# Patient Record
Sex: FEMALE | Race: WHITE | NOT HISPANIC OR LATINO | Employment: PART TIME | URBAN - METROPOLITAN AREA
[De-identification: names, ages, dates, MRNs, and addresses within clinical notes are randomized per-mention and may not be internally consistent; named-entity substitution may affect disease eponyms.]

---

## 2017-01-02 ENCOUNTER — TRANSCRIBE ORDERS (OUTPATIENT)
Dept: ADMINISTRATIVE | Age: 37
End: 2017-01-02

## 2017-01-02 ENCOUNTER — APPOINTMENT (OUTPATIENT)
Dept: LAB | Age: 37
End: 2017-01-02
Payer: COMMERCIAL

## 2017-01-02 DIAGNOSIS — D72.819 DECREASED WHITE BLOOD CELL COUNT: ICD-10-CM

## 2017-01-02 DIAGNOSIS — R94.6 ABNORMAL RESULTS OF THYROID FUNCTION STUDIES: ICD-10-CM

## 2017-01-02 LAB
BASOPHILS # BLD AUTO: 0.01 THOUSANDS/ΜL (ref 0–0.1)
BASOPHILS NFR BLD AUTO: 0 % (ref 0–1)
EOSINOPHIL # BLD AUTO: 0.02 THOUSAND/ΜL (ref 0–0.61)
EOSINOPHIL NFR BLD AUTO: 1 % (ref 0–6)
ERYTHROCYTE [DISTWIDTH] IN BLOOD BY AUTOMATED COUNT: 13.2 % (ref 11.6–15.1)
HCT VFR BLD AUTO: 38.2 % (ref 34.8–46.1)
HGB BLD-MCNC: 12.8 G/DL (ref 11.5–15.4)
LYMPHOCYTES # BLD AUTO: 1.24 THOUSANDS/ΜL (ref 0.6–4.47)
LYMPHOCYTES NFR BLD AUTO: 33 % (ref 14–44)
MCH RBC QN AUTO: 30 PG (ref 26.8–34.3)
MCHC RBC AUTO-ENTMCNC: 33.5 G/DL (ref 31.4–37.4)
MCV RBC AUTO: 90 FL (ref 82–98)
MONOCYTES # BLD AUTO: 0.46 THOUSAND/ΜL (ref 0.17–1.22)
MONOCYTES NFR BLD AUTO: 12 % (ref 4–12)
NEUTROPHILS # BLD AUTO: 2.03 THOUSANDS/ΜL (ref 1.85–7.62)
NEUTS SEG NFR BLD AUTO: 54 % (ref 43–75)
NRBC BLD AUTO-RTO: 0 /100 WBCS
PLATELET # BLD AUTO: 183 THOUSANDS/UL (ref 149–390)
PMV BLD AUTO: 12.6 FL (ref 8.9–12.7)
RBC # BLD AUTO: 4.27 MILLION/UL (ref 3.81–5.12)
T4 FREE SERPL-MCNC: 0.8 NG/DL (ref 0.76–1.46)
TSH SERPL DL<=0.05 MIU/L-ACNC: 5.9 UIU/ML (ref 0.36–3.74)
WBC # BLD AUTO: 3.76 THOUSAND/UL (ref 4.31–10.16)

## 2017-01-02 PROCEDURE — 84443 ASSAY THYROID STIM HORMONE: CPT

## 2017-01-02 PROCEDURE — 83520 IMMUNOASSAY QUANT NOS NONAB: CPT

## 2017-01-02 PROCEDURE — 84439 ASSAY OF FREE THYROXINE: CPT

## 2017-01-02 PROCEDURE — 86800 THYROGLOBULIN ANTIBODY: CPT

## 2017-01-02 PROCEDURE — 85025 COMPLETE CBC W/AUTO DIFF WBC: CPT

## 2017-01-02 PROCEDURE — 36415 COLL VENOUS BLD VENIPUNCTURE: CPT

## 2017-01-03 ENCOUNTER — GENERIC CONVERSION - ENCOUNTER (OUTPATIENT)
Dept: OTHER | Facility: OTHER | Age: 37
End: 2017-01-03

## 2017-01-04 ENCOUNTER — GENERIC CONVERSION - ENCOUNTER (OUTPATIENT)
Dept: OTHER | Facility: OTHER | Age: 37
End: 2017-01-04

## 2017-01-04 LAB
PROTEINASE3 AB SER IA-ACNC: <3.5 U/ML (ref 0–3.5)
THYROGLOB AB SERPL-ACNC: <1 IU/ML (ref 0–0.9)

## 2017-01-23 ENCOUNTER — ALLSCRIPTS OFFICE VISIT (OUTPATIENT)
Dept: OTHER | Facility: OTHER | Age: 37
End: 2017-01-23

## 2017-01-30 ENCOUNTER — ALLSCRIPTS OFFICE VISIT (OUTPATIENT)
Dept: OTHER | Facility: OTHER | Age: 37
End: 2017-01-30

## 2017-02-27 ENCOUNTER — HOSPITAL ENCOUNTER (OUTPATIENT)
Dept: RADIOLOGY | Age: 37
Discharge: HOME | End: 2017-02-27
Payer: COMMERCIAL

## 2017-02-27 ENCOUNTER — TRANSCRIBE ORDERS (OUTPATIENT)
Dept: ADMINISTRATIVE | Age: 37
End: 2017-02-27

## 2017-02-27 ENCOUNTER — GENERIC CONVERSION - ENCOUNTER (OUTPATIENT)
Dept: OTHER | Facility: OTHER | Age: 37
End: 2017-02-27

## 2017-02-27 ENCOUNTER — LAB (OUTPATIENT)
Dept: LAB | Age: 37
End: 2017-02-27
Payer: COMMERCIAL

## 2017-02-27 DIAGNOSIS — E03.9 HYPOTHYROIDISM: ICD-10-CM

## 2017-02-27 DIAGNOSIS — N28.1 RENAL CYST: ICD-10-CM

## 2017-02-27 LAB
T4 FREE SERPL-MCNC: 0.87 NG/DL (ref 0.76–1.46)
TSH SERPL DL<=0.05 MIU/L-ACNC: 4.41 UIU/ML (ref 0.36–3.74)

## 2017-02-27 PROCEDURE — 36415 COLL VENOUS BLD VENIPUNCTURE: CPT

## 2017-02-27 PROCEDURE — 84439 ASSAY OF FREE THYROXINE: CPT

## 2017-02-27 PROCEDURE — 76770 US EXAM ABDO BACK WALL COMP: CPT

## 2017-02-27 PROCEDURE — 84443 ASSAY THYROID STIM HORMONE: CPT

## 2017-04-06 ENCOUNTER — ALLSCRIPTS OFFICE VISIT (OUTPATIENT)
Dept: OTHER | Facility: OTHER | Age: 37
End: 2017-04-06

## 2017-04-06 ENCOUNTER — TRANSCRIBE ORDERS (OUTPATIENT)
Dept: ADMINISTRATIVE | Facility: HOSPITAL | Age: 37
End: 2017-04-06

## 2017-04-06 DIAGNOSIS — N28.1 ACQUIRED CYST OF KIDNEY: Primary | ICD-10-CM

## 2017-04-13 ENCOUNTER — ALLSCRIPTS OFFICE VISIT (OUTPATIENT)
Dept: OTHER | Facility: OTHER | Age: 37
End: 2017-04-13

## 2017-04-13 DIAGNOSIS — E53.8 DEFICIENCY OF OTHER SPECIFIED B GROUP VITAMINS: ICD-10-CM

## 2017-04-27 DIAGNOSIS — E03.9 HYPOTHYROIDISM: ICD-10-CM

## 2017-06-12 ENCOUNTER — ALLSCRIPTS OFFICE VISIT (OUTPATIENT)
Dept: OTHER | Facility: OTHER | Age: 37
End: 2017-06-12

## 2017-06-12 DIAGNOSIS — E03.9 HYPOTHYROIDISM: ICD-10-CM

## 2017-06-12 DIAGNOSIS — Z00.00 ENCOUNTER FOR GENERAL ADULT MEDICAL EXAMINATION WITHOUT ABNORMAL FINDINGS: ICD-10-CM

## 2017-08-30 DIAGNOSIS — E03.9 HYPOTHYROIDISM: ICD-10-CM

## 2017-09-28 ENCOUNTER — TRANSCRIBE ORDERS (OUTPATIENT)
Dept: ADMINISTRATIVE | Age: 37
End: 2017-09-28

## 2017-09-28 ENCOUNTER — APPOINTMENT (OUTPATIENT)
Dept: RADIOLOGY | Age: 37
End: 2017-09-28
Payer: COMMERCIAL

## 2017-09-28 DIAGNOSIS — M85.80 SAPHO SYNDROME (HCC): ICD-10-CM

## 2017-09-28 DIAGNOSIS — M15.0 PRIMARY GENERALIZED HYPERTROPHIC OSTEOARTHROSIS: ICD-10-CM

## 2017-09-28 DIAGNOSIS — M86.9 SAPHO SYNDROME (HCC): ICD-10-CM

## 2017-09-28 DIAGNOSIS — M65.9 SAPHO SYNDROME (HCC): ICD-10-CM

## 2017-09-28 DIAGNOSIS — L40.3 SAPHO SYNDROME (HCC): ICD-10-CM

## 2017-09-28 DIAGNOSIS — L70.9 SAPHO SYNDROME (HCC): ICD-10-CM

## 2017-09-28 DIAGNOSIS — M15.0 PRIMARY GENERALIZED HYPERTROPHIC OSTEOARTHROSIS: Primary | ICD-10-CM

## 2017-09-28 PROCEDURE — 72050 X-RAY EXAM NECK SPINE 4/5VWS: CPT

## 2017-09-28 PROCEDURE — 73030 X-RAY EXAM OF SHOULDER: CPT

## 2017-09-28 PROCEDURE — 72110 X-RAY EXAM L-2 SPINE 4/>VWS: CPT

## 2017-10-31 ENCOUNTER — ALLSCRIPTS OFFICE VISIT (OUTPATIENT)
Dept: OTHER | Facility: OTHER | Age: 37
End: 2017-10-31

## 2017-11-01 NOTE — PROGRESS NOTES
Assessment  1  Fibromyalgia (729 1) (M79 7)    Plan   2 - Cristo ROSAS, Renay Camarena  (Rheumatology) Co-Management  *  Status: Hold For - Scheduling  Requested for: [id number]  Ordered; For: Fibromyalgia; Ordered By: Alejandro Cabrera  Performed:   Due: [id number]     Discussion/Summary  Discussion Summary:   Bloodwork reviewd, normaldr cristo for fibromyalgiadate with pap and mammo  Chief Complaint  Chief Complaint Chronic Condition [facility]: Patient is here today for follow up of chronic conditions described in HPI  History of Present Illness  HPI: patient is to follow up on chronic conditionsmultiple joint and back painarthritis on xraywith fibromyalgia  [facility]      Review of Systems  Complete-Female:   Constitutional: no fever,-- not feeling poorly,-- no chills-- and-- not feeling tired  Eyes: no eye pain,-- no eyesight problems,-- eyes not red-- and-- no purulent discharge from the eyes  ENT: no earache,-- no nosebleeds,-- no hearing loss-- and-- no nasal discharge  Cardiovascular: the heart rate was not slow,-- no chest pain,-- the heart rate was not fast-- and-- no palpitations  Respiratory: no shortness of breath,-- no cough,-- no wheezing-- and-- no shortness of breath during exertion  Gastrointestinal: no abdominal pain,-- no nausea,-- no constipation-- and-- no diarrhea  Musculoskeletal: arthralgias-- and-- myalgias  Integumentary: no rashes-- and-- no skin lesions  Neurological: no headache-- and-- no numbness  Psychiatric: no anxiety,-- no sleep disturbances-- and-- no depression  Hematologic/Lymphatic: no tendency for easy bleeding  Active Problems  1  Acquired complex cyst of kidney (593 2) (N28 1)   2  Acute sinusitis (461 9) (J01 90)   3  Allergic rhinitis (477 9) (J30 9)   4  Anemia (285 9) (D64 9)   5  Arachnoid cyst (348 0) (G93 0)   6  Arthritis (716 90) (M19 90)   7  Bursitis, hip, right (726 5) (M70 71)   8   Hip pain, right (719 45) (M25 551)   9  Hypothyroidism (244 9) (E03 9)   10  Leukopenia (288 50) (D72 819)   11  Menorrhagia (626 2) (N92 0)   12  Myofascial pain syndrome (729 1) (M79 1)   13  Nonrheumatic mitral valve insufficiency (424 0) (I34 0)   14  Non-smoker (V49 89) (Z78 9)   15  Other fatigue (780 79) (R53 83)   16  Otitis media, left (382 9) (H66 92)   17  Pain of right great toe (729 5) (M79 674)   18  Pigmented nevus (216 9) (D22 9)   19  Renal cyst, left (753 10) (N28 1)   20  Sacroiliitis (720 2) (M46 1)   21  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)   22  Vitamin B12 deficiency (266 2) (E53 8)   23  Vitamin D deficiency (268 9) (E55 9)    Past Medical History  1  History of Abnormal blood chemistry (790 6) (R79 9)   2  History of Arthritis (V13 4)   3  History of Cervical radiculopathy (723 4) (M54 12)   4  History of Cough (786 2) (R05)   5  History of Facial Numbness (782 0)   6  History of acute bronchitis (V12 69) (Z87 09)   7  History of acute otitis media (V12 49) (Z86 69)   8  History of acute sinusitis (V12 69) (Z87 09)   9  History of acute sinusitis (V12 69) (Z87 09)   10  History of bronchitis (V12 69) (Z87 09)   11  History of chest pain (V13 89) (Z87 898)   12  History of viral gastroenteritis (V12 09) (Z86 19)   13  History of Neck pain (723 1) (M54 2)   14  History of Need for influenza vaccination (V04 81) (Z23)   15  History of Need for pneumococcal vaccination (V03 82) (Z23)   16  History of Nonepileptic Seizures (780 39)   17  History of Numbness Of Both Arms (782 0)   18  History of Pain in joint of left shoulder (719 41) (M25 512)  Active Problems And Past Medical History Reviewed: The active problems and past medical history were reviewed and updated today  Surgical History  1  History of Denial Of Any Significant Medical History   2  History of Hysterectomy (V88 01)  Surgical History Reviewed: The surgical history was reviewed and updated today  Family History  Father    1  Family history of Exposure To Chemical Contaminants - Dioxin  Family History    2  Family history of Alzheimer Disease   3  Family history of Arthritis (V17 7)   4  Family history of Bone Cancer   5  Family history of Breast Cancer (V16 3)   6  Family history of Cancer   7  Family history of Heart Disease (V17 49)   8  Family history of Hypertension (V17 49)   9  Family history of Skin Cancer (V16 8)   10  Family history of Stroke Complications   11  Family history of Thyroid Disorder (V18 19)  Family History Reviewed: The family history was reviewed and updated today  Social History   · Denied: History of Alcohol Use (History)   · Denied: History of Caffeine Use   · Denied: Drug Use   · Educational Level - Grade 12 Completed   · Marital History - Currently    · Never a smoker   · Never Drank Alcohol   · Non-smoker (V49 89) (Z78 9)  Social History Reviewed: The social history was reviewed and updated today  The social history was reviewed and is unchanged  Current Meds   1  Calcium 500 + D 500-125 MG-UNIT Oral Tablet; TAKE 1 TABLET DAILY; Therapy: (Recorded:31Oct2017) to Recorded   2  Ami-C Oral Tablet; TAKE 1 TABLET DAILY AS DIRECTED; Therapy: (Recorded:31Oct2017) to Recorded   3  Levothyroxine Sodium 25 MCG Oral Tablet; TAKE 1 TABLET BY MOUTH IN THE MORNING; Therapy: 10BJM0766 to (GQBYVUKE:82NMC5808)  Requested for: 77VOP3118; Last Rx:30Jun2017   Ordered   4  Vitamin B-12 500 MCG Oral Tablet; TAKE 1 TABLET DAILY; Therapy: 02KVE6624 to Recorded   5  Vitamin D 2000 UNIT Oral Tablet; Therapy: (Recorded:31Oct2017) to Recorded  Medication List Reviewed: The medication list was reviewed and updated today  Allergies  1  Iron TABS   2  Penicillins   3  Advil TABS  4   Seasonal    Vitals  Vital Signs    Recorded: 58NMW4797 07:59AM   Heart Rate 64   Respiration 16   Systolic 455, LUE, Sitting   Diastolic 72, LUE, Sitting   BP CUFF SIZE Large   Height 5 ft 4 in   Weight 177 lb 0 6 oz BMI Calculated 30 39   BSA Calculated 1 86     Physical Exam    Constitutional   General appearance: No acute distress, well appearing and well nourished  Eyes   Conjunctiva and lids: No swelling, erythema or discharge  Pupils and irises: Equal, round and reactive to light  Ears, Nose, Mouth, and Throat   External inspection of ears and nose: Normal     Otoscopic examination: Tympanic membranes translucent with normal light reflex  Canals patent without erythema  Nasal mucosa, septum, and turbinates: Normal without edema or erythema  Oropharynx: Normal with no erythema, edema, exudate or lesions  Pulmonary   Respiratory effort: No increased work of breathing or signs of respiratory distress  Auscultation of lungs: Clear to auscultation  Cardiovascular   Auscultation of heart: Normal rate and rhythm, normal S1 and S2, without murmurs  Examination of extremities for edema and/or varicosities: Normal     Abdomen   Abdomen: Non-tender, no masses  Liver and spleen: No hepatomegaly or splenomegaly  Lymphatic   Palpation of lymph nodes in neck: No lymphadenopathy  Musculoskeletal   Gait and station: Normal     Digits and nails: Normal without clubbing or cyanosis  Inspection/palpation of joints, bones, and muscles: Normal     Skin   Skin and subcutaneous tissue: Normal without rashes or lesions      Psychiatric   Orientation to person, place, and time: Normal     Mood and affect: Normal          Future Appointments    Date/Time Provider Specialty Site   05/02/2018 08:00 AM Giana Gomez, [facility]  [facility]   04/10/2018 02:30 PM [address] [place] [address] [address]   01/23/2018 08:00 AM Tamiko Graff MD Hematology Oncology [facility] [place]     Signatures   Electronically signed by : Baljit Howard; Oct 31 2017  5:33PM EST                       (Author)    Electronically signed by : Dimitri Kaminski M D ; Oct 31 2017  5:46PM EST                       (Review)

## 2017-12-19 ENCOUNTER — ALLSCRIPTS OFFICE VISIT (OUTPATIENT)
Dept: OTHER | Facility: OTHER | Age: 37
End: 2017-12-19

## 2017-12-23 NOTE — PROGRESS NOTES
Assessment   1  Acute sinusitis (461 9) (J01 90)    Plan   PMH: History of acute sinusitis    · Azithromycin 250 MG Oral Tablet; TAKE 2 TABLETS ON DAY 1 THEN TAKE 1    TABLET A DAY FOR 4 DAYS    Discussion/Summary      Start antibiotics  of fluids and rest       Chief Complaint   Patient presents today for a cough  History of Present Illness   HPI: since saturday has been having cough, nasal congestion, pnd, right earache          Review of Systems        Constitutional: no fever,-- not feeling poorly,-- no chills-- and-- not feeling tired  ENT: earache-- and-- nasal discharge, but-- no nosebleeds-- and-- no hearing loss  Cardiovascular: the heart rate was not slow,-- no chest pain,-- the heart rate was not fast-- and-- no palpitations  Respiratory: cough, but-- no shortness of breath,-- no wheezing-- and-- no shortness of breath during exertion  Gastrointestinal: no abdominal pain,-- no nausea,-- no constipation-- and-- no diarrhea  Integumentary: no rashes,-- no itching,-- no skin lesions-- and-- no skin wound  Neurological: no headache,-- no numbness-- and-- no dizziness  Active Problems   1  Acquired complex cyst of kidney (593 2) (N28 1)   2  Acute sinusitis (461 9) (J01 90)   3  Allergic rhinitis (477 9) (J30 9)   4  Anemia (285 9) (D64 9)   5  Arachnoid cyst (348 0) (G93 0)   6  Arthritis (716 90) (M19 90)   7  Bursitis, hip, right (726 5) (M70 71)   8  Fibromyalgia (729 1) (M79 7)   9  Hip pain, right (719 45) (M25 551)   10  Hypothyroidism (244 9) (E03 9)   11  Leukopenia (288 50) (D72 819)   12  Menorrhagia (626 2) (N92 0)   13  Myofascial pain syndrome (729 1) (M79 1)   14  Nonrheumatic mitral valve insufficiency (424 0) (I34 0)   15  Non-smoker (V49 89) (Z78 9)   16  Other fatigue (780 79) (R53 83)   17  Otitis media, left (382 9) (H66 92)   18  Pain of right great toe (729 5) (M79 674)   19  Pigmented nevus (216 9) (D22 9)   20   Renal cyst, left (753 10) (N28 1) 21  Sacroiliitis (720 2) (M46 1)   22  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)   23  Vitamin B12 deficiency (266 2) (E53 8)   24  Vitamin D deficiency (268 9) (E55 9)    Past Medical History   Active Problems And Past Medical History Reviewed: The active problems and past medical history were reviewed and updated today  Surgical History   Surgical History Reviewed: The surgical history was reviewed and updated today  Social History    · Denied: History of Alcohol Use (History)   · Denied: History of Caffeine Use   · Denied: Drug Use   · Educational Level - Grade 12 Completed   · Marital History - Currently    · Never a smoker   · Never Drank Alcohol   · Never smoked cigarettes (V49 89) (Z78 9)   · Non-smoker (V49 89) (Z78 9)  The social history was reviewed and updated today  The social history was reviewed and is unchanged  Family History   Family History Reviewed: The family history was reviewed and updated today  Current Meds    1  Calcium 500 + D 500-125 MG-UNIT Oral Tablet; TAKE 1 TABLET DAILY; Therapy: (Recorded:31Oct2017) to Recorded   2  Ami-C Oral Tablet; TAKE 1 TABLET DAILY AS DIRECTED; Therapy: (Recorded:31Oct2017) to Recorded   3  Levothyroxine Sodium 25 MCG Oral Tablet; TAKE 1 TABLET BY MOUTH IN THE     MORNING; Therapy: 21SLU2699 to (OILWDTZI:64YCL9229)  Requested for: 84ISU8577; Last     Rx:30Jun2017 Ordered   4  Vitamin B-12 500 MCG Oral Tablet; TAKE 1 TABLET DAILY; Therapy: 85IGS7268 to Recorded   5  Vitamin D 2000 UNIT Oral Tablet; Therapy: (Recorded:31Oct2017) to Recorded     The medication list was reviewed and updated today  Allergies   1  Iron TABS   2  Penicillins   3  Advil TABS  4   Seasonal    Vitals    Recorded: 33ALW5437 04:01PM   Heart Rate 86   Respiration 20   Systolic 874   Diastolic 72   Height 5 ft 4 in   Weight 175 lb 0 4 oz   BMI Calculated 30 04   BSA Calculated 1 85   O2 Saturation 99     Physical Exam Constitutional      General appearance: No acute distress, well appearing and well nourished  Eyes      Conjunctiva and lids: No swelling, erythema or discharge  Pupils and irises: Equal, round and reactive to light  Ears, Nose, Mouth, and Throat      External inspection of ears and nose: Normal        Otoscopic examination: Tympanic membranes translucent with normal light reflex  Canals patent without erythema  Nasal mucosa, septum, and turbinates: Normal without edema or erythema  Oropharynx: Normal with no erythema, edema, exudate or lesions  Pulmonary      Respiratory effort: No increased work of breathing or signs of respiratory distress  Auscultation of lungs: Clear to auscultation  Cardiovascular      Palpation of heart: Normal PMI, no thrills  Auscultation of heart: Normal rate and rhythm, normal S1 and S2, without murmurs  Examination of extremities for edema and/or varicosities: Normal        Carotid pulses: Normal        Abdomen      Abdomen: Non-tender, no masses  Liver and spleen: No hepatomegaly or splenomegaly  Lymphatic      Palpation of lymph nodes in neck: No lymphadenopathy  Musculoskeletal      Digits and nails: Normal without clubbing or cyanosis  Skin      Skin and subcutaneous tissue: Normal without rashes or lesions         Psychiatric      Orientation to person, place, and time: Normal        Mood and affect: Normal           Future Appointments      Date/Time Provider Specialty Site   05/02/2018 08:00 AM [facility], [facility]  [facility]   04/10/2018 02:30 PM Cami Bernal Urology [address]   01/23/2018 08:00 AM Phyllis Graff MD Hematology Oncology [facility] [place]     Signatures    Electronically signed by : Sailaja Apple; Dec 21 2017 12:44PM EST                       (Author)     Electronically signed by : Rowan Chaparro ISHAN Hernandez ; Dec 22 2017 11:48AM EST                       (Review)

## 2018-01-08 DIAGNOSIS — D64.9 ANEMIA: ICD-10-CM

## 2018-01-09 NOTE — RESULT NOTES
Verified Results  (1) TSH WITH FT4 REFLEX 57Wmm5729 07:50AM Marcellus Murdock Order Number: [id number]     Test Name Result Flag Reference   TSH 4 410 uIU/mL H 0 358-3 740   Patients undergoing fluorescein dye angiography may retain small amounts of fluorescein in the body for 48-72 hours post procedure  Samples containing fluorescein can produce falsely depressed TSH values  If the patient had this procedure,a specimen should be resubmitted post fluorescein clearance            The recommended reference ranges for TSH during pregnancy are as follows:  First trimester 0 1 to 2 5 uIU/mL  Second trimester  0 2 to 3 0 uIU/mL  Third trimester 0 3 to 3 0 uIU/m   T4,FREE 0 87 ng/dL  0 76-1 46

## 2018-01-11 NOTE — RESULT NOTES
Verified Results  US THYROID [id number] 07:55AM Adalberto Hernandez Order Number: [id number]    - Patient Instructions: To schedule this appointment, please contact Central Scheduling at [phone]  Test Name Result Flag Reference   US THYROID (Report)     THYROID ULTRASOUND     INDICATION: Nontoxic single thyroid nodule  Abnormal thyroid lab values  COMPARISON: Thyroid ultrasound February 1, 2012     TECHNIQUE:  Ultrasound of the thyroid was performed with a high frequency linear transducer in transverse and sagittal planes including volumetric imaging sweeps as well as traditional still imaging technique  FINDINGS:   Normal homogeneous smooth echotexture  Right gland: 5 6 x 1 0 x 1 6 cm  No dominant nodules  Left gland: 4 6 x 0 8 x 1 4 cm  No dominant nodules  Isthmus: 0 2 cm in AP dimension  No dominant nodules  IMPRESSION:      Normal sonographic appearance of the thyroid gland  Workstation performed: [id number]     Signed by:   Antonio Almendarez DO   9/19/16       Discussion/Summary   THYROID US NORMAL

## 2018-01-12 VITALS
OXYGEN SATURATION: 98 % | WEIGHT: 172.01 LBS | DIASTOLIC BLOOD PRESSURE: 62 MMHG | RESPIRATION RATE: 16 BRPM | HEIGHT: 64 IN | HEART RATE: 111 BPM | SYSTOLIC BLOOD PRESSURE: 112 MMHG | BODY MASS INDEX: 29.37 KG/M2 | TEMPERATURE: 98 F

## 2018-01-12 VITALS
DIASTOLIC BLOOD PRESSURE: 72 MMHG | WEIGHT: 177.04 LBS | HEART RATE: 64 BPM | RESPIRATION RATE: 16 BRPM | HEIGHT: 64 IN | SYSTOLIC BLOOD PRESSURE: 116 MMHG | BODY MASS INDEX: 30.22 KG/M2

## 2018-01-12 NOTE — RESULT NOTES
Verified Results  (1) ANTITHYROGLOBULIN ANTIBODY 02Jan2017 10:15AM Jen Boyd Order Number: [id number]     Test Name Result Flag Reference   THYROGLOB AB <1 0 IU/mL  0 0 - 0 9   Thyroglobulin Antibody measured by [facility] Methodology    Performed at:  [address]  [id number]  : Radha Pak MD, Phone:  [phone]       Discussion/Summary   THYROID ANTIBODY NEGATIVE

## 2018-01-13 VITALS
SYSTOLIC BLOOD PRESSURE: 120 MMHG | WEIGHT: 176 LBS | BODY MASS INDEX: 30.05 KG/M2 | HEART RATE: 76 BPM | DIASTOLIC BLOOD PRESSURE: 78 MMHG | HEIGHT: 64 IN

## 2018-01-13 VITALS
HEART RATE: 81 BPM | RESPIRATION RATE: 18 BRPM | TEMPERATURE: 98.1 F | HEIGHT: 64 IN | DIASTOLIC BLOOD PRESSURE: 70 MMHG | OXYGEN SATURATION: 98 % | WEIGHT: 174.04 LBS | BODY MASS INDEX: 29.71 KG/M2 | SYSTOLIC BLOOD PRESSURE: 110 MMHG

## 2018-01-13 NOTE — RESULT NOTES
Verified Results  * MRI HIP RIGHT WO CONTRAST [id number] 12:42PM Jessy Picking Order Number: [id number]     Test Name Result Flag Reference   MRI HIP RIGHT WO CONTRAST (Report)     MRI RIGHT HIP     INDICATION: Right hip pain, difficulty walking for 1 1/2 years  COMPARISON: Abdomen and pelvic CT from 2/15/2016  TECHNIQUE: MR sequences were obtained of the right hip and pelvis including: Localizer, axial T2 fat sat, coronal T1/STIR, cone-down axial oblique PD of the affected hip, coronal PD of the affected hip, sagittal T2 fat sat of the affected hip  Images    were acquired on a 1 5 Monet unit  Gadolinium was not used  FINDINGS:     RIGHT HIP:     -JOINT EFFUSION: None  -BONE MARROW SIGNAL AND ALIGNMENT: Normal marrow signal demonstrated without hip fracture or AVN  -ACETABULAR LABRUM: Intact  -TROCHANTERIC BURSA: There is mild trochanteric bursitis  LEFT HIP: (please note dedicated small field of view images were not made of the left hip joint limiting its evaluation )      -JOINT EFFUSION: None  -BONE MARROW SIGNAL AND ALIGNMENT: Normal marrow signal demonstrated without hip fracture or AVN  -ACETABULAR LABRUM: No gross abnormalities although evaluation is very limited  -TROCHANTERIC BURSA: There is mild trochanteric bursitis  REST OF PELVIS:     -BONE MARROW SIGNAL: Normal      -SI JOINTS AND SYMPHYSIS PUBIS: Intact  -VISUALIZED LUMBAR SPINE: Unremarkable  -MUSCULATURE: Intact with intact hamstring origins bilaterally  -PELVIC SOFT TISSUES: Patient status post hysterectomy  SUBCUTANEOUS TISSUES: Normal       IMPRESSION:     Mild trochanteric bursitis bilaterally, otherwise unremarkable MRI of the right hip  Workstation performed: [id number]     Signed by:   Stephen Colin MD   6/15/16       Discussion/Summary   MRI SHOWS BURSITIS OF THE HIP  IF YOU SEE ORTHO THEY CAN SOMETIMES DO INJECTIONS OF STEROIDS TO HELP WITH PAIN    SOMETIMES ANTI-INFLAMMATORY MEDICATIONS HELP    CALL WITH ANY QUESTIONS      Signatures   Electronically signed by : DENNY Garcia; Jun 16 2016 11:18PM EST                       (Author)

## 2018-01-13 NOTE — PROGRESS NOTES
Assessment    1  Encounter for preventive health examination (V70 0) (Z00 00)   2  Hypothyroidism (244 9) (E03 9)   3  Other fatigue (780 79) (R53 83)    Plan  Health Maintenance, Hypothyroidism    · (1) CBC/PLT/DIFF; Status:Active; Requested [id number];    · (1) COMPREHENSIVE METABOLIC PANEL; Status:Active; Requested [id number];    · (1) LIPID PANEL FASTING W DIRECT LDL REFLEX; Status:Active; Requested  MTV:[id number];    · (1) TSH WITH FT4 REFLEX; Status:Active; Requested [id number];     Discussion/Summary  health maintenance visit Currently, she eats a healthy diet  the risks and benefits of cervical cancer screening were discussed cervical cancer screening is current cervical cancer screening is needed every year Breast cancer screening: breast cancer screening is not indicated  Colorectal cancer screening: colorectal cancer screening is not indicated  Advice and education were given regarding nutrition, aerobic exercise, weight loss, self skin examination, helmet use and seat belt use  Continue current meds  bloodwork ordered  eat healthy and exercise  Possible side effects of new medications were reviewed with the patient/guardian today  The treatment plan was reviewed with the patient/guardian  The patient/guardian understands and agrees with the treatment plan      Chief Complaint  Patient presents today for a wellness  History of Present Illness  HM, Adult Female: The patient is being seen for a health maintenance evaluation  The last health maintenance visit was 1 year(s) ago  General Health: The patient's health since the last visit is described as good  She has regular dental visits  She denies vision problems  She denies hearing loss  Lifestyle:  She consumes a diverse and healthy diet  She does not have any weight concerns  She exercises regularly  Reproductive health: the patient is premenopausal   she reports normal menses  pregnancy history: G 2P 2     Screening: cancer screening reviewed and current  metabolic screening reviewed and current  risk screening reviewed and current  HPI: patient is here for a physical for camp  she has hypothyroidism which is under control with levothyroxine      Review of Systems    Constitutional: no fever, not feeling poorly, no chills and not feeling tired  Eyes: no eye pain, no eyesight problems and no purulent discharge from the eyes  ENT: no earache, no nosebleeds and no nasal discharge  Cardiovascular: the heart rate was not slow, no chest pain, the heart rate was not fast and no palpitations  Respiratory: no shortness of breath, no cough, no wheezing and no shortness of breath during exertion  Gastrointestinal: no abdominal pain, no nausea, no constipation and no diarrhea  Genitourinary: no dysuria  Musculoskeletal: no arthralgias, no joint swelling and no joint stiffness  Integumentary: no rashes, no itching and no skin wound  Neurological: no headache, no numbness and no dizziness  Psychiatric: no anxiety, no sleep disturbances and no depression  Hematologic/Lymphatic: no swollen glands  Active Problems    1  Acquired complex cyst of kidney (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 2) (N28 1)   2  Acute sinusitis (461 9) (J01 90)   3  Allergic rhinitis (477 9) (J30 9)   4  Anemia (285 9) (D64 9)   5  Arachnoid cyst (348 0) (G93 0)   6  Arthritis (716 90) (M19 90)   7  Bursitis, hip, right (726 5) (M70 71)   8  Hip pain, right (719 45) (M25 551)   9  Hypothyroidism (244 9) (E03 9)   10  Leukopenia (288 50) (D72 819)   11  Menorrhagia (626 2) (N92 0)   12  Myofascial pain syndrome (729 1) (M79 1)   13  Nonrheumatic mitral valve insufficiency (424 0) (I34 0)   14  Non-smoker (V49 89) (Z78 9)   15  Other fatigue (780 79) (R53 83)   16  Otitis media, left (382 9) (H66 92)   17  Pain of right great toe (729 5) (M79 674)   18  Pigmented nevus (216 9) (D22 9)   19  Renal cyst, left (753 10) (N28 1)   20  Sacroiliitis (720 2) (M46 1)   21   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)   22  Vitamin B12 deficiency (266 2) (E53 8)   23   Vitamin D deficiency (268 9) (E55 9)    Past Medical History    · History of Abnormal blood chemistry (790 6) (R79 9)   · History of Arthritis (V13 4)   · History of Cervical radiculopathy (723 4) (M54 12)   · History of Cough (786 2) (R05)   · History of Facial Numbness (782 0)   · History of acute bronchitis (V12 69) (Z87 09)   · History of acute otitis media (V12 49) (Z86 69)   · History of acute sinusitis (V12 69) (Z87 09)   · History of acute sinusitis (V12 69) (Z87 09)   · History of bronchitis (V12 69) (Z87 09)   · History of chest pain (V13 89) (F44 751)   · History of viral gastroenteritis (V12 09) (Z86 19)   · History of Neck pain (723 1) (M54 2)   · History of Need for influenza vaccination (V04 81) (Z23)   · History of Need for pneumococcal vaccination (V03 82) (Z23)   · History of Nonepileptic Seizures (780 39)   · History of Numbness Of Both Arms (782 0)   · History of Pain in joint of left shoulder (719 41) (M25 512)    Surgical History    · History of Denial Of Any Significant Medical History   · History of Hysterectomy (V88 01)    Family History  Father    · Family history of Exposure To Chemical Contaminants - Dioxin  Family History    · Family history of Alzheimer Disease   · Family history of Arthritis (V17 7)   · Family history of Bone Cancer   · Family history of Breast Cancer (V16 3)   · Family history of Cancer   · Family history of Heart Disease (V17 49)   · Family history of Hypertension (V17 49)   · Family history of Skin Cancer (V16 8)   · Family history of Stroke Complications   · Family history of Thyroid Disorder (V18 19)    Social History    · Denied: History of Alcohol Use (History)   · Denied: History of Caffeine Use   · Denied: Drug Use   · Educational Level - Grade 12 Completed   · Marital History - Currently    · Never a smoker   · Never Drank Alcohol   · Non-smoker (V49 89) (Z78 9)    Current Meds   1  Calcium 500 + D 500-125 MG-UNIT Oral Tablet; TAKE 1 TABLET DAILY; Therapy: (Recorded:30Jan2014) to Recorded   2  Ami-C Oral Tablet; Therapy: (Recorded:17Feb2014) to Recorded   3  Levothyroxine Sodium 50 MCG Oral Tablet; Take 1 tablet daily; Therapy: 49JNW4044 to (Last Rx:05Dfi4308)  Requested for: 06Dpn8465 Ordered   4  Vitamin B-12 500 MCG Oral Tablet; TAKE 1 TABLET DAILY; Therapy: 69DRY8611 to Recorded   5  Vitamin D 2000 UNIT Oral Tablet; Therapy: (Recorded:17Feb2014) to Recorded    Allergies    1  Iron TABS   2  Penicillins   3  Advil TABS    4  Seasonal    Vitals   Recorded: 12Jun2017 08:54AM   Temperature 98 5 F   Heart Rate 62   Respiration 18   Systolic 770   Diastolic 60   Height 5 ft 4 in   Weight 175 lb 0 6 oz   BMI Calculated 30 05   BSA Calculated 1 85   O2 Saturation 98     Physical Exam    Constitutional   General appearance: No acute distress, well appearing and well nourished  Eyes   Conjunctiva and lids: No swelling, erythema or discharge  Pupils and irises: Equal, round and reactive to light  Ears, Nose, Mouth, and Throat   External inspection of ears and nose: Normal     Otoscopic examination: Tympanic membranes translucent with normal light reflex  Canals patent without erythema  Oropharynx: Normal with no erythema, edema, exudate or lesions  Pulmonary   Respiratory effort: No increased work of breathing or signs of respiratory distress  Auscultation of lungs: Clear to auscultation  Cardiovascular   Palpation of heart: Normal PMI, no thrills  Auscultation of heart: Normal rate and rhythm, normal S1 and S2, without murmurs  Examination of extremities for edema and/or varicosities: Normal     Abdomen   Abdomen: Non-tender, no masses  Liver and spleen: No hepatomegaly or splenomegaly  Lymphatic   Palpation of lymph nodes in neck: No lymphadenopathy      Musculoskeletal   Gait and station: Normal     Digits and nails: Normal without clubbing or cyanosis  Inspection/palpation of joints, bones, and muscles: Normal     Skin   Skin and subcutaneous tissue: Normal without rashes or lesions  Neurologic   Cranial nerves: Cranial nerves 2-12 intact  Reflexes: 2+ and symmetric  Sensation: No sensory loss      Psychiatric   Orientation to person, place, and time: Normal     Mood and affect: Normal        Future Appointments    Date/Time Provider Specialty Site   08/02/2017 08:00 AM Pravin Schaffer, [facility] Internal Medicine [facility]   04/10/2018 02:30 PM [address] [place] [address] [facility] [address] [place] [address]   01/23/2018 08:00 AM Sandie Graff MD Hematology Oncology [facility]     Signatures   Electronically signed by : Cyril Humphries; Jun 16 2017  1:14PM EST                       (Author)    Electronically signed by : ISHAN Dejesus ; Jun 23 2017  7:50PM EST                       (Review)

## 2018-01-14 VITALS
HEIGHT: 64 IN | DIASTOLIC BLOOD PRESSURE: 60 MMHG | HEART RATE: 62 BPM | OXYGEN SATURATION: 98 % | WEIGHT: 175.04 LBS | BODY MASS INDEX: 29.88 KG/M2 | TEMPERATURE: 98.5 F | RESPIRATION RATE: 18 BRPM | SYSTOLIC BLOOD PRESSURE: 118 MMHG

## 2018-01-14 VITALS
HEIGHT: 64 IN | RESPIRATION RATE: 16 BRPM | OXYGEN SATURATION: 97 % | HEART RATE: 87 BPM | TEMPERATURE: 98.1 F | WEIGHT: 171.13 LBS | DIASTOLIC BLOOD PRESSURE: 76 MMHG | SYSTOLIC BLOOD PRESSURE: 118 MMHG | BODY MASS INDEX: 29.21 KG/M2

## 2018-01-14 NOTE — RESULT NOTES
Verified Results  (1) COMPREHENSIVE METABOLIC PANEL 27GBN3255 64:13OT Calin, [facility] Kidney Disease Education Program recommendations are as follows:  GFR calculation is accurate only with a steady state creatinine  Chronic Kidney disease less than 60 ml/min/1 73 sq  meters  Kidney failure less than 15 ml/min/1 73 sq  meters  Test Name Result Flag Reference   GLUCOSE,RANDM 86 mg/dL     If the patient is fasting, the ADA then defines impaired fasting glucose as > 100 mg/dL and diabetes as > or equal to 123 mg/dL  SODIUM 141 mmol/L  136-145   POTASSIUM 3 8 mmol/L  3 5-5 3   CHLORIDE 111 mmol/L H 100-108   CARBON DIOXIDE 25 mmol/L  21-32   ANION GAP (CALC) 5 mmol/L  4-13   BLOOD UREA NITROGEN 10 mg/dL  5-25   CREATININE 0 61 mg/dL  0 60-1 30   Standardized to IDMS reference method   CALCIUM 8 0 mg/dL L 8 3-10 1   BILI, TOTAL 0 78 mg/dL  0 20-1 00   ALK PHOSPHATAS 63 U/L     ALT (SGPT) 16 U/L  12-78   AST(SGOT) 14 U/L  5-45   ALBUMIN 3 5 g/dL  3 5-5 0   TOTAL PROTEIN 6 4 g/dL  6 4-8 2   eGFR Non-African American      >60 0 ml/min/1 73sq m     (1) CBC/PLT/DIFF 37QAE5793 01:39PM Deepak Ruvalcaba     Test Name Result Flag Reference   WBC COUNT 3 40 Thousand/uL L 4 31-10 16   RBC COUNT 4 08 Million/uL  3 81-5 12   HEMOGLOBIN 12 2 g/dL  11 5-15 4   HEMATOCRIT 36 6 %  34 8-46  1   MCV 89 7 fL  82 0-98 0   MCH 29 9 pg  26 8-34 3   MCHC 33 3 g/dL  31 4-37 4   RDW 13 5 %  11 6-15 1   MPV 12 1 fL  8 9-12 7   PLATELET COUNT 415 Thousands/uL  149-390   nRBC AUTOMATED 0 /100 WBCs     NEUTROPHILS RELATIVE PERCENT 50 %  43-75   LYMPHOCYTES RELATIVE PERCENT 32 %  14-44   MONOCYTES RELATIVE PERCENT 17 % H 4-12   EOSINOPHILS RELATIVE PERCENT 1 %  0-6   BASOPHILS RELATIVE PERCENT 0 %  0-1   NEUTROPHILS ABSOLUTE COUNT 1 71 Thousands/µL L 1 85-7 62   LYMPHOCYTES ABSOLUTE COUNT 1 07 Thousands/µL  0 60-4 47   MONOCYTES ABSOLUTE COUNT 0 58 Thousand/µL  0 17-1 22   EOSINOPHILS ABSOLUTE COUNT 0 02 Thousand/µL 0  00-0 61   BASOPHILS ABSOLUTE COUNT 0 01 Thousands/µL  0 00-0 10

## 2018-01-15 NOTE — RESULT NOTES
Verified Results  (1) TSH WITH FT4 REFLEX 02Jan2017 10:15AM Himanshu Tirado Order Number: [id number]     Test Name Result Flag Reference   TSH 5 900 uIU/mL H 0 358-3 740   Patients undergoing fluorescein dye angiography may retain small amounts of fluorescein in the body for 48-72 hours post procedure  Samples containing fluorescein can produce falsely depressed TSH values  If the patient had this procedure,a specimen should be resubmitted post fluorescein clearance            The recommended reference ranges for TSH during pregnancy are as follows:  First trimester 0 1 to 2 5 uIU/mL  Second trimester  0 2 to 3 0 uIU/mL  Third trimester 0 3 to 3 0 uIU/m   T4,FREE 0 80 ng/dL  0 76-1 46
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
## 2018-01-17 NOTE — RESULT NOTES
Verified Results  (1) COMPREHENSIVE METABOLIC PANEL [id number] 64:66WX Tree Mcdonnell Order Number: [id number]     Test Name Result Flag Reference   GLUCOSE,RANDM 78 mg/dL     If the patient is fasting, the ADA then defines impaired fasting glucose as > 100 mg/dL and diabetes as > or equal to 123 mg/dL  SODIUM 140 mmol/L  136-145   POTASSIUM 3 7 mmol/L  3 5-5 3   CHLORIDE 106 mmol/L  100-108   CARBON DIOXIDE 27 mmol/L  21-32   ANION GAP (CALC) 7 mmol/L  4-13   BLOOD UREA NITROGEN 8 mg/dL  5-25   CREATININE 0 73 mg/dL  0 60-1 30   Standardized to IDMS reference method   CALCIUM 8 4 mg/dL  8 3-10 1   BILI, TOTAL 0 63 mg/dL  0 20-1 00   ALK PHOSPHATAS 57 U/L     ALT (SGPT) 17 U/L  12-78   AST(SGOT) 9 U/L  5-45   ALBUMIN 3 6 g/dL  3 5-5 0   TOTAL PROTEIN 6 6 g/dL  6 4-8 2   eGFR Non-African American      >60 0 ml/min/1 73sq m   - Patient Instructions: This is a fasting blood test  Water, black tea or black coffee only after 9:00pm the night before test Drink 2 glasses of water the morning of test   National Kidney Disease Education Program recommendations are as follows:  GFR calculation is accurate only with a steady state creatinine  Chronic Kidney disease less than 60 ml/min/1 73 sq  meters  Kidney failure less than 15 ml/min/1 73 sq  meters  (1) CBC/PLT/DIFF [id number] 08:28AM Tree Mcdonnell Order Number: [id number]     Test Name Result Flag Reference   WBC COUNT 3 45 Thousand/uL L 4 31-10 16   RBC COUNT 3 97 Million/uL  3 81-5 12   HEMOGLOBIN 12 2 g/dL  11 5-15 4   HEMATOCRIT 35 8 %  34 8-46  1   MCV 90 fL  82-98   MCH 30 7 pg  26 8-34 3   MCHC 34 1 g/dL  31 4-37 4   RDW 13 3 %  11 6-15 1   MPV 12 3 fL  8 9-12 7   PLATELET COUNT 093 Thousands/uL  149-390   nRBC AUTOMATED 0 /100 WBCs     NEUTROPHILS RELATIVE PERCENT 58 %  43-75   LYMPHOCYTES RELATIVE PERCENT 29 %  14-44   MONOCYTES RELATIVE PERCENT 12 %  4-12   EOSINOPHILS RELATIVE PERCENT 1 %  0-6   BASOPHILS RELATIVE PERCENT 0 %  0-1   NEUTROPHILS ABSOLUTE COUNT 1 97 Thousands/?L  1 85-7 62   LYMPHOCYTES ABSOLUTE COUNT 1 01 Thousands/?L  0 60-4 47   MONOCYTES ABSOLUTE COUNT 0 42 Thousand/?L  0 17-1 22   EOSINOPHILS ABSOLUTE COUNT 0 03 Thousand/?L  0 00-0 61   BASOPHILS ABSOLUTE COUNT 0 01 Thousands/?L  0 00-0 10   - Patient Instructions: This bloodwork is non-fasting  Please drink two glasses of water morning of bloodwork  - Patient Instructions: This bloodwork is non-fasting  Please drink two glasses of water morning of bloodwork  (1) TSH WITH FT4 REFLEX [id number] 08:28AM Vena Curlin Order Number: [id number]     Test Name Result Flag Reference   TSH 3 860 uIU/mL H 0 358-3 740   - Patient Instructions: This is a fasting blood test  Water, black tea or black coffee only after 9:00pm the night before test Drink 2 glasses of water the morning of test   Patients undergoing fluorescein dye angiography may retain small amounts of fluorescein in the body for 48-72 hours post procedure  Samples containing fluorescein can produce falsely depressed TSH values  If the patient had this procedure,a specimen should be resubmitted post fluorescein clearance  The recommended reference ranges for TSH during pregnancy are as follows:  First trimester 0 1 to 2 5 uIU/mL  Second trimester  0 2 to 3 0 uIU/mL  Third trimester 0 3 to 3 0 uIU/m   T4,FREE 0 89 ng/dL  0 76-1 46   - Patient Instructions: This is a fasting blood test  Water, black tea or black coffee only after 9:00pm the night before test Drink 2 glasses of water the morning of test      (1) LIPID PANEL FASTING W DIRECT LDL REFLEX [id number] 08:28AM Vena Curlin Order Number: [id number]     Test Name Result Flag Reference   CHOLESTEROL 177 mg/dL     LDL CHOLESTEROL CALCULATED 106 mg/dL H 0-100   - Patient Instructions:  This is a fasting blood test  Water, black tea or black coffee only after 9:00pm the night before test   Drink 2 glasses of water the morning of test     - Patient Instructions: This is a fasting blood test  Water, black tea or black coffee only after 9:00pm the night before test Drink 2 glasses of water the morning of test   Triglyceride:         Normal              <150 mg/dl       Borderline High    150-199 mg/dl       High               200-499 mg/dl       Very High          >499 mg/dl  Cholesterol:         Desirable        <200 mg/dl      Borderline High  200-239 mg/dl      High             >239 mg/dl  HDL Cholesterol:        High    >59 mg/dL      Low     <41 mg/dL  LDL Cholesterol:        Optimal          <100 mg/dl        Near Optimal     100-129 mg/dl        Above Optimal          Borderline High   130-159 mg/dl          High              160-189 mg/dl          Very High        >189 mg/dl  LDL CALCULATED:    This screening LDL is a calculated result  It does not have the accuracy of the Direct Measured LDL in the monitoring of patients with hyperlipidemia and/or statin therapy  Direct Measure LDL (MLE669) must be ordered separately in these patients  TRIGLYCERIDES 75 mg/dL  <=150   Specimen collection should occur prior to N-Acetylcysteine or Metamizole administration due to the potential for falsely depressed results  HDL,DIRECT 56 mg/dL  40-60   Specimen collection should occur prior to Metamizole administration due to the potential for falsely depressed results  (1) VITAMIN B12 [id number] 08:28AM Tree Mcdonnell Order Number: [id number]     Test Name Result Flag Reference   VITAMIN B12 555 pg/mL  100-900   - Patient Instructions:  This is a fasting blood test  Water, black tea or black coffee only after 9:00pm the night before test Drink 2 glasses of water the morning of test      (1) VITAMIN D 25-HYDROXY [id number] 08:28AM Tree Mcdonnell Order Number: [id number]     Test Name Result Flag Reference   VIT D 25-HYDROX 33 0 ng/mL  30 0-100 0   This assay is a certified procedure of the CDC Vitamin D Standardization Certification Program (VDSCP)     Deficiency <20ng/ml   Insufficiency 20-30ng/ml   Sufficient  ng/ml     *Patients undergoing fluorescein dye angiography may retain small amounts of fluorescein in the body for 48-72 hours post procedure  Samples containing fluorescein can produce falsely elevated Vitamin D values  If the patient had this procedure, a specimen should be resubmitted post fluorescein clearance  Discussion/Summary   TSH IS MINIMALLY ELEVATED  WHITE COUNT IS MILD BELOW NORMAL  ALL OTHER LABS ARE OK  WE WILL REPEAT TSH IN 3 MONTHS WITH ANTIBODIES

## 2018-01-23 VITALS
HEIGHT: 64 IN | RESPIRATION RATE: 20 BRPM | WEIGHT: 175.03 LBS | SYSTOLIC BLOOD PRESSURE: 110 MMHG | HEART RATE: 86 BPM | BODY MASS INDEX: 29.88 KG/M2 | DIASTOLIC BLOOD PRESSURE: 72 MMHG | OXYGEN SATURATION: 99 %

## 2018-02-12 ENCOUNTER — HOSPITAL ENCOUNTER (OUTPATIENT)
Dept: NON INVASIVE DIAGNOSTICS | Facility: CLINIC | Age: 38
Discharge: HOME | End: 2018-02-12
Payer: COMMERCIAL

## 2018-02-12 ENCOUNTER — TELEPHONE (OUTPATIENT)
Dept: INTERNAL MEDICINE CLINIC | Facility: CLINIC | Age: 38
End: 2018-02-12

## 2018-02-12 DIAGNOSIS — E03.8 HYPOTHYROIDISM DUE TO HASHIMOTO'S THYROIDITIS: Primary | ICD-10-CM

## 2018-02-12 DIAGNOSIS — E06.3 HYPOTHYROIDISM DUE TO HASHIMOTO'S THYROIDITIS: Primary | ICD-10-CM

## 2018-02-12 DIAGNOSIS — I34.8 MYXOID TRANSFORMATION OF MITRAL VALVE: ICD-10-CM

## 2018-02-12 PROCEDURE — 93306 TTE W/DOPPLER COMPLETE: CPT

## 2018-02-12 PROCEDURE — 93306 TTE W/DOPPLER COMPLETE: CPT | Performed by: INTERNAL MEDICINE

## 2018-02-12 NOTE — TELEPHONE ENCOUNTER
Patient is asking if she can have her thyroid checked  Patient has symptoms of hair falling out/tired/gaining weight, juarez  Patient is due to fill her medication but wanted to wait to see if her dose needs to be changed       Please advise

## 2018-02-27 DIAGNOSIS — N28.1 ACQUIRED CYST OF KIDNEY: ICD-10-CM

## 2018-02-28 ENCOUNTER — TELEPHONE (OUTPATIENT)
Dept: INTERNAL MEDICINE CLINIC | Facility: CLINIC | Age: 38
End: 2018-02-28

## 2018-02-28 NOTE — TELEPHONE ENCOUNTER
I cannot find the results  Please ask her where she got it done and call that lab to send it to us then send me the task to review it

## 2018-02-28 NOTE — TELEPHONE ENCOUNTER
Was able to find results in pt's chart from [facility]  TSH was normal at 3 35  Left message for pt with results

## 2018-03-06 ENCOUNTER — OFFICE VISIT (OUTPATIENT)
Dept: CARDIOLOGY CLINIC | Facility: CLINIC | Age: 38
End: 2018-03-06
Payer: COMMERCIAL

## 2018-03-06 VITALS
DIASTOLIC BLOOD PRESSURE: 70 MMHG | SYSTOLIC BLOOD PRESSURE: 90 MMHG | HEIGHT: 64 IN | RESPIRATION RATE: 20 BRPM | WEIGHT: 181.3 LBS | BODY MASS INDEX: 30.95 KG/M2 | HEART RATE: 60 BPM

## 2018-03-06 DIAGNOSIS — I34.0 NON-RHEUMATIC MITRAL REGURGITATION: Primary | ICD-10-CM

## 2018-03-06 PROCEDURE — 93000 ELECTROCARDIOGRAM COMPLETE: CPT | Performed by: INTERNAL MEDICINE

## 2018-03-06 PROCEDURE — 99213 OFFICE O/P EST LOW 20 MIN: CPT | Performed by: INTERNAL MEDICINE

## 2018-03-06 RX ORDER — AZITHROMYCIN 250 MG/1
TABLET, FILM COATED ORAL DAILY
COMMUNITY
Start: 2016-09-15 | End: 2018-03-06 | Stop reason: ALTCHOICE

## 2018-03-06 RX ORDER — CHOLECALCIFEROL (VITAMIN D3) 50 MCG
TABLET ORAL
COMMUNITY

## 2018-03-06 RX ORDER — LEVOTHYROXINE SODIUM 0.03 MG/1
1 TABLET ORAL
COMMUNITY
Start: 2017-01-06 | End: 2018-04-06 | Stop reason: SDUPTHER

## 2018-03-06 RX ORDER — CHOLECALCIFEROL (VITAMIN D3) 125 MCG
1 CAPSULE ORAL DAILY
COMMUNITY
Start: 2013-10-16 | End: 2020-03-03

## 2018-03-06 RX ORDER — DOXYCYCLINE HYCLATE 100 MG
1 TABLET ORAL EVERY 12 HOURS
COMMUNITY
Start: 2017-12-20 | End: 2018-03-06 | Stop reason: ALTCHOICE

## 2018-03-06 RX ORDER — AZITHROMYCIN 250 MG/1
TABLET, FILM COATED ORAL
Refills: 0 | COMMUNITY
Start: 2017-12-19 | End: 2018-03-06 | Stop reason: ALTCHOICE

## 2018-03-06 NOTE — PROGRESS NOTES
Cardiology Follow Up    Derrell Poster  1980  [id number]  [address] [facility] [place] [facility] [address]    1  Non-rheumatic mitral regurgitation  POCT ECG    Echo complete with contrast if indicated         Discussion/Summary:Her MR remains mild by recent ECHO  I have reviewed her medications and made no changes  RTO 2 years, repeat ECHO at that time  Interval History: She has not had any cardiac problems since her last OV  She tries to stay active and denies CP, SOB, palpitations  She is getting occasional edema in her hands and feet  Recent ECHO only showed mild MR with a normal EF  Her father just had an Mi at age 71  She does not have HTN or DM  She does not smoke  Patient Active Problem List   Diagnosis    Non-rheumatic mitral regurgitation     Past Medical History:   Diagnosis Date    Arthritis     Leukopenia     Nonrheumatic mitral valve insufficiency 06/2013    Vitamin B 12 deficiency     Vitamin D deficiency      Social History     Social History    Marital status: /Civil Union     Spouse name: N/A    Number of children: N/A    Years of education: N/A     Occupational History    Not on file       Social History Main Topics    Smoking status: Never Smoker    Smokeless tobacco: Never Used    Alcohol use No    Drug use: No    Sexual activity: Not on file     Other Topics Concern    Not on file     Social History Narrative    No narrative on file      Family History   Problem Relation Age of Onset    Other Father      exposed to chemical contaminants-dioxin    Alzheimer's disease Family     Arthritis Family     Bone cancer Family     Breast cancer Family     Heart disease Family     Hypertension Family     Stroke Family     Thyroid disease Family      Past Surgical History:   Procedure Laterality Date    HYSTERECTOMY         Current Outpatient Prescriptions:     Bioflavonoid Products (IDANIA C PO), Take 1 tablet by mouth daily, Disp: , Rfl:     Calcium Carbonate-Vitamin D (CALCIUM 500 + D) 500-125 MG-UNIT TABS, Take 1 tablet by mouth daily, Disp: , Rfl:     Cholecalciferol (VITAMIN D) 2000 units tablet, Take by mouth, Disp: , Rfl:     Cholecalciferol 1000 units tablet, Take 1,000 Units by mouth, Disp: , Rfl:     cyanocobalamin (VITAMIN B-12) 500 mcg tablet, Take 1 tablet by mouth daily, Disp: , Rfl:     levothyroxine 25 mcg tablet, Take 1 tablet by mouth, Disp: , Rfl:     Multiple Vitamin (MULTIVITAMIN) tablet, Take 1 tablet by mouth daily  , Disp: , Rfl:     cyanocobalamin 1000 MCG tablet, Take 1,000 mcg by mouth, Disp: , Rfl:     HYDROcodone-acetaminophen (NORCO) 5-325 mg per tablet, Take 1 tablet by mouth every 6 (six) hours as needed for moderate pain (Not relieved by Anti-inflammatory)  Max Daily Amount: 4 tablets, Disp: 20 tablet, Rfl: 0  Allergies   Allergen Reactions    Cortisone Rash and GI Intolerance    Ferrous Gluconate      Other reaction(s): Constipation    Ibuprofen     Other     Penicillins Rash       Labs:  No visits with results within 2 Month(s) from this visit  Latest known visit with results is:   Lab on 02/27/2017   Component Date Value    TSH 3RD GENERATON 02/27/2017 4 410*    Free T4 02/27/2017 0 87      Imaging: No results found  EKG:SB ,normal ECG  Review of Systems:  Review of Systems   Constitutional: Negative for diaphoresis, fatigue, fever and unexpected weight change  HENT: Negative  Respiratory: Negative for cough, shortness of breath and wheezing  Cardiovascular: Negative for chest pain, palpitations and leg swelling  Gastrointestinal: Negative for abdominal pain, diarrhea and nausea  Musculoskeletal: Negative for gait problem and myalgias  Skin: Negative for rash  Neurological: Negative for dizziness and numbness  Psychiatric/Behavioral: Negative          Physical Exam:  Physical Exam Constitutional: She is oriented to person, place, and time  She appears well-developed and well-nourished  HENT:   Head: Normocephalic and atraumatic  Eyes: Pupils are equal, round, and reactive to light  Neck: Normal range of motion  Neck supple  No JVD present  Cardiovascular: Regular rhythm, S1 normal, S2 normal and normal pulses  Pulses:       Carotid pulses are 2+ on the right side, and 2+ on the left side  Pulmonary/Chest: Effort normal and breath sounds normal  She has no wheezes  She has no rales  Abdominal: Soft  Bowel sounds are normal  There is no tenderness  Musculoskeletal: Normal range of motion  She exhibits no edema or tenderness  Neurological: She is alert and oriented to person, place, and time  She has normal reflexes  No cranial nerve deficit  Skin: Skin is warm  Psychiatric: She has a normal mood and affect

## 2018-03-20 ENCOUNTER — OFFICE VISIT (OUTPATIENT)
Dept: HEMATOLOGY ONCOLOGY | Facility: CLINIC | Age: 38
End: 2018-03-20
Payer: COMMERCIAL

## 2018-03-20 VITALS
WEIGHT: 181 LBS | BODY MASS INDEX: 30.9 KG/M2 | HEIGHT: 64 IN | TEMPERATURE: 98.2 F | DIASTOLIC BLOOD PRESSURE: 74 MMHG | RESPIRATION RATE: 16 BRPM | OXYGEN SATURATION: 98 % | SYSTOLIC BLOOD PRESSURE: 110 MMHG | HEART RATE: 74 BPM

## 2018-03-20 DIAGNOSIS — D72.819 LEUKOPENIA, UNSPECIFIED TYPE: Primary | ICD-10-CM

## 2018-03-20 DIAGNOSIS — E61.1 IRON DEFICIENCY: ICD-10-CM

## 2018-03-20 PROCEDURE — 99213 OFFICE O/P EST LOW 20 MIN: CPT | Performed by: INTERNAL MEDICINE

## 2018-03-20 NOTE — PROGRESS NOTES
HPI:   Andry Gunn is a 44 y o  female with   borderline leukopenia and iron deficiency  She had iron deficiency previously secondary to menorrhagia  Patient had NEVILLE for fibroids in the uterus in 2014  She is not anemic any more  Ferritin level is low normal   She is taking 1  Flintstones vitamin tablet daily and she will take twice a day  She will have stool checked for blood  Borderline leukopenia is not new and WBC count has been fluctuating  No neutropenia  She has some tiredness  She states she was recently diagnosed to have fibromyalgia and she follows with her rheumatologist   She has  previous history of recurrent upper respiratory infections since childhood and had IV Ig infusions during childhood and had some reaction to IVIG infusion  Her IgG level was within normal range  IgA level was also normal     She has a complex cyst in left kidney on ultrasound and she follows with her urologist      She has hypothyroidism and is on medication for that  Current Outpatient Prescriptions:     Bioflavonoid Products (IDANIA C PO), Take 1 tablet by mouth daily, Disp: , Rfl:     Calcium Carbonate-Vitamin D (CALCIUM 500 + D) 500-125 MG-UNIT TABS, Take 1 tablet by mouth daily, Disp: , Rfl:     Cholecalciferol (VITAMIN D) 2000 units tablet, Take by mouth, Disp: , Rfl:     Cholecalciferol 1000 units tablet, Take 1,000 Units by mouth, Disp: , Rfl:     cyanocobalamin (VITAMIN B-12) 500 mcg tablet, Take 1 tablet by mouth daily, Disp: , Rfl:     cyanocobalamin 1000 MCG tablet, Take 1,000 mcg by mouth, Disp: , Rfl:     HYDROcodone-acetaminophen (NORCO) 5-325 mg per tablet, Take 1 tablet by mouth every 6 (six) hours as needed for moderate pain (Not relieved by Anti-inflammatory)  Max Daily Amount: 4 tablets, Disp: 20 tablet, Rfl: 0    levothyroxine 25 mcg tablet, Take 1 tablet by mouth, Disp: , Rfl:     Multiple Vitamin (MULTIVITAMIN) tablet, Take 1 tablet by mouth daily  , Disp: , Rfl:     Allergies Allergen Reactions    Cortisone Rash and GI Intolerance    Ferrous Gluconate      Other reaction(s): Constipation    Ibuprofen     Other     Penicillins Rash       ROS:  03/20/18 Reviewed 13 systems:  Presently no headaches, seizures, dizziness, diplopia, dysphagia, hoarseness, chest pain, palpitations, shortness of breath, cough, hemoptysis, abdominal pain, nausea, vomiting, change in bowel habits, melena, hematuria, fever, chills, bleeding, bone pains, skin rash, weight loss, arthritic symptoms, numbness,  weakness, claudication and gait problem  No frequent infections  No hot flashes  Not unusually sensitive to heat or cold  No swelling of the ankles  No swollen glands  Patient is anxious     No GYN symptoms  Other symptoms are in HPI        /74 (BP Location: Left arm, Cuff Size: Adult)   Pulse 74   Temp 98 2 °F (36 8 °C) (Tympanic)   Resp 16   Ht 5' 4" (1 626 m)   Wt 82 1 kg (181 lb)   SpO2 98%   BMI 31 07 kg/m²     Physical Exam:  Alert, oriented, not in distress, no icterus, no oral thrush, no palpable neck mass, clear lung fields, regular heart rate, abdomen  soft and non tender, no palpable abdominal mass, no ascites, no edema of ankles, no calf tenderness, no focal neurological deficit, no skin rash, no palpable lymphadenopathy, good arterial pulses, no clubbing  Patient is anxious  Performance status 0  IMAGING:  No orders to display       LABS:  Results for orders placed or performed in visit on 03/06/18   POCT ECG   Result Value Ref Range    Interpretation     Hemoglobin 12 6  Ferritin 26  WBC 3700  Neutrophil 2100  Platelets [id number]  Reviewed and discussed with patient  Assessment and plan:  Jorge Cramer is a 44 y o  female with   borderline leukopenia and iron deficiency  She had iron deficiency previously secondary to menorrhagia  Patient had NEVILLE for fibroids in the uterus in 2014  She is not anemic any more    Ferritin level is low normal   She is taking 1 Flintstones vitamin tablet daily and she will take twice a day  She will have stool checked for blood  Borderline leukopenia is not new and WBC count has been fluctuating  No neutropenia  She has some tiredness  She states she was recently diagnosed to have fibromyalgia and she follows with her rheumatologist   She has  previous history of recurrent upper respiratory infections since childhood and had IV Ig infusions during childhood and had some reaction to IVIG infusion  Her IgG level was within normal range  IgA level was also normal     She has a complex cyst in left kidney on ultrasound and she follows with her urologist      She has hypothyroidism and is on medication for that  Physical examination and test results are as recorded and discussed  Plan is  one Flintstones vitamin tablet twice a day, stool to be checked for blood, blood counts and ferritin prior to next visit in 1 year  Also discussed the importance of self-breast examination, eating healthy foods, exercise as tolerated and health screening tests  All discussed in detail  Questions answered  She will come back in 1 year but if she has any problem related  to our speciality  she will call and come back sooner  Patient voiced understanding and agreement in the discussion  Counseling / Coordination of Care   Greater than 50% of total time was spent with the patient and / or family counseling and / or coordination of care

## 2018-03-20 NOTE — LETTER
March 20, 2018     MD [address]  [address] [address]    Patient: Nedra Tang   YOB: 1980   Date of Visit: 3/20/2018       Dear Dr Reji Michael: Thank you for referring Scarlet Morales to me for evaluation  Below are my notes for this consultation  If you have questions, please do not hesitate to call me  I look forward to following your patient along with you  Sincerely,        Nena Waters MD        CC: No Recipients  Nena Waters MD  3/20/2018  8:43 AM  Sign at close encounter    HPI:   Nedra Tang is a 44 y o  female with   borderline leukopenia and iron deficiency  She had iron deficiency previously secondary to menorrhagia  Patient had NEVILLE for fibroids in the uterus in 2014  She is not anemic any more  Ferritin level is low normal   She is taking 1  Flintstones vitamin tablet daily and she will take twice a day  She will have stool checked for blood  Borderline leukopenia is not new and WBC count has been fluctuating  No neutropenia  She has some tiredness  She states she was recently diagnosed to have fibromyalgia and she follows with her rheumatologist   She has  previous history of recurrent upper respiratory infections since childhood and had IV Ig infusions during childhood and had some reaction to IVIG infusion  Her IgG level was within normal range  IgA level was also normal     She has a complex cyst in left kidney on ultrasound and she follows with her urologist      She has hypothyroidism and is on medication for that        Current Outpatient Prescriptions:     Bioflavonoid Products (IDAINA C PO), Take 1 tablet by mouth daily, Disp: , Rfl:     Calcium Carbonate-Vitamin D (CALCIUM 500 + D) 500-125 MG-UNIT TABS, Take 1 tablet by mouth daily, Disp: , Rfl:     Cholecalciferol (VITAMIN D) 2000 units tablet, Take by mouth, Disp: , Rfl:     Cholecalciferol 1000 units tablet, Take 1,000 Units by mouth, Disp: , Rfl:     cyanocobalamin (VITAMIN B-12) 500 mcg tablet, Take 1 tablet by mouth daily, Disp: , Rfl:     cyanocobalamin 1000 MCG tablet, Take 1,000 mcg by mouth, Disp: , Rfl:     HYDROcodone-acetaminophen (NORCO) 5-325 mg per tablet, Take 1 tablet by mouth every 6 (six) hours as needed for moderate pain (Not relieved by Anti-inflammatory)  Max Daily Amount: 4 tablets, Disp: 20 tablet, Rfl: 0    levothyroxine 25 mcg tablet, Take 1 tablet by mouth, Disp: , Rfl:     Multiple Vitamin (MULTIVITAMIN) tablet, Take 1 tablet by mouth daily  , Disp: , Rfl:     Allergies   Allergen Reactions    Cortisone Rash and GI Intolerance    Ferrous Gluconate      Other reaction(s): Constipation    Ibuprofen     Other     Penicillins Rash       ROS:  03/20/18 Reviewed 13 systems:  Presently no headaches, seizures, dizziness, diplopia, dysphagia, hoarseness, chest pain, palpitations, shortness of breath, cough, hemoptysis, abdominal pain, nausea, vomiting, change in bowel habits, melena, hematuria, fever, chills, bleeding, bone pains, skin rash, weight loss, arthritic symptoms, numbness,  weakness, claudication and gait problem  No frequent infections  No hot flashes  Not unusually sensitive to heat or cold  No swelling of the ankles  No swollen glands  Patient is anxious     No GYN symptoms  Other symptoms are in HPI        /74 (BP Location: Left arm, Cuff Size: Adult)   Pulse 74   Temp 98 2 °F (36 8 °C) (Tympanic)   Resp 16   Ht 5' 4" (1 626 m)   Wt 82 1 kg (181 lb)   SpO2 98%   BMI 31 07 kg/m²      Physical Exam:  Alert, oriented, not in distress, no icterus, no oral thrush, no palpable neck mass, clear lung fields, regular heart rate, abdomen  soft and non tender, no palpable abdominal mass, no ascites, no edema of ankles, no calf tenderness, no focal neurological deficit, no skin rash, no palpable lymphadenopathy, good arterial pulses, no clubbing  Patient is anxious  Performance status 0      IMAGING:  No orders to display LABS:  Results for orders placed or performed in visit on 03/06/18   POCT ECG   Result Value Ref Range    Interpretation     Hemoglobin 12 6  Ferritin 26  WBC 3700  Neutrophil 2100  Platelets [id number]  Reviewed and discussed with patient  Assessment and plan:  Atif Sanchez is a 44 y o  female with   borderline leukopenia and iron deficiency  She had iron deficiency previously secondary to menorrhagia  Patient had NEVILLE for fibroids in the uterus in 2014  She is not anemic any more  Ferritin level is low normal   She is taking 1  Flintstones vitamin tablet daily and she will take twice a day  She will have stool checked for blood  Borderline leukopenia is not new and WBC count has been fluctuating  No neutropenia  She has some tiredness  She states she was recently diagnosed to have fibromyalgia and she follows with her rheumatologist   She has  previous history of recurrent upper respiratory infections since childhood and had IV Ig infusions during childhood and had some reaction to IVIG infusion  Her IgG level was within normal range  IgA level was also normal     She has a complex cyst in left kidney on ultrasound and she follows with her urologist      She has hypothyroidism and is on medication for that  Physical examination and test results are as recorded and discussed  Plan is  one Flintstones vitamin tablet twice a day, stool to be checked for blood, blood counts and ferritin prior to next visit in 1 year  Also discussed the importance of self-breast examination, eating healthy foods, exercise as tolerated and health screening tests  All discussed in detail  Questions answered  She will come back in 1 year but if she has any problem related  to our speciality  she will call and come back sooner  Patient voiced understanding and agreement in the discussion     Counseling / Coordination of Care   Greater than 50% of total time was spent with the patient and / or family counseling and / or coordination of care

## 2018-03-27 ENCOUNTER — HOSPITAL ENCOUNTER (OUTPATIENT)
Dept: RADIOLOGY | Age: 38
Discharge: HOME | End: 2018-03-27
Payer: COMMERCIAL

## 2018-03-27 DIAGNOSIS — N28.1 ACQUIRED CYST OF KIDNEY: ICD-10-CM

## 2018-03-27 PROCEDURE — 76770 US EXAM ABDO BACK WALL COMP: CPT

## 2018-04-06 DIAGNOSIS — E03.9 HYPOTHYROIDISM, UNSPECIFIED TYPE: Primary | ICD-10-CM

## 2018-04-09 RX ORDER — LEVOTHYROXINE SODIUM 0.03 MG/1
25 TABLET ORAL DAILY
Qty: 30 TABLET | Refills: 0 | Status: SHIPPED | OUTPATIENT
Start: 2018-04-09 | End: 2018-04-10 | Stop reason: SDUPTHER

## 2018-04-09 NOTE — TELEPHONE ENCOUNTER
I just sent it  She was Alyssa's  Not sure whom she will be f/u with  Pls ask her bec I see appts with Eloisa Castro and Kaylene coming up

## 2018-04-10 ENCOUNTER — OFFICE VISIT (OUTPATIENT)
Dept: FAMILY MEDICINE CLINIC | Facility: CLINIC | Age: 38
End: 2018-04-10
Payer: COMMERCIAL

## 2018-04-10 VITALS
DIASTOLIC BLOOD PRESSURE: 68 MMHG | WEIGHT: 183.2 LBS | RESPIRATION RATE: 16 BRPM | HEART RATE: 72 BPM | BODY MASS INDEX: 31.45 KG/M2 | SYSTOLIC BLOOD PRESSURE: 110 MMHG

## 2018-04-10 DIAGNOSIS — J30.1 SEASONAL ALLERGIC RHINITIS DUE TO POLLEN: ICD-10-CM

## 2018-04-10 DIAGNOSIS — M79.7 FIBROMYALGIA: ICD-10-CM

## 2018-04-10 DIAGNOSIS — E55.9 VITAMIN D DEFICIENCY: ICD-10-CM

## 2018-04-10 DIAGNOSIS — D80.3 SELECTIVE DEFICIENCY OF IGG (HCC): ICD-10-CM

## 2018-04-10 DIAGNOSIS — D72.819 LEUKOPENIA, UNSPECIFIED TYPE: Primary | ICD-10-CM

## 2018-04-10 DIAGNOSIS — D64.9 ANEMIA, UNSPECIFIED TYPE: ICD-10-CM

## 2018-04-10 DIAGNOSIS — E61.1 IRON DEFICIENCY: ICD-10-CM

## 2018-04-10 DIAGNOSIS — E53.8 VITAMIN B12 DEFICIENCY: ICD-10-CM

## 2018-04-10 DIAGNOSIS — E03.9 HYPOTHYROIDISM, UNSPECIFIED TYPE: ICD-10-CM

## 2018-04-10 PROCEDURE — 99214 OFFICE O/P EST MOD 30 MIN: CPT | Performed by: NURSE PRACTITIONER

## 2018-04-10 RX ORDER — LEVOTHYROXINE SODIUM 0.03 MG/1
25 TABLET ORAL DAILY
Qty: 30 TABLET | Refills: 11 | Status: SHIPPED | OUTPATIENT
Start: 2018-04-10 | End: 2019-04-27 | Stop reason: SDUPTHER

## 2018-04-10 RX ORDER — CYCLOBENZAPRINE HCL 5 MG
5 TABLET ORAL 3 TIMES DAILY PRN
Refills: 2 | COMMUNITY
Start: 2018-03-28 | End: 2020-03-03

## 2018-04-10 NOTE — PROGRESS NOTES
Assessment/Plan:           Problem List Items Addressed This Visit     Leukopenia - Primary     Monitor labs for now         Relevant Orders    Comprehensive metabolic panel    CBC and differential    TSH, 3rd generation with T4 reflex    Lipid Panel with Direct LDL reflex    Iron deficiency     Cont f/u with heme/onc         Relevant Orders    Comprehensive metabolic panel    CBC and differential    TSH, 3rd generation with T4 reflex    Lipid Panel with Direct LDL reflex    Hypothyroidism     Refill and cont meds  Labs          Relevant Medications    levothyroxine 25 mcg tablet    Other Relevant Orders    Comprehensive metabolic panel    CBC and differential    TSH, 3rd generation with T4 reflex    Lipid Panel with Direct LDL reflex    Allergic rhinitis     Cont antihistamine otc          Relevant Orders    Comprehensive metabolic panel    CBC and differential    TSH, 3rd generation with T4 reflex    Lipid Panel with Direct LDL reflex    Anemia    Relevant Orders    Comprehensive metabolic panel    CBC and differential    TSH, 3rd generation with T4 reflex    Lipid Panel with Direct LDL reflex    Fibromyalgia     F/u with rheumatology         Relevant Orders    Comprehensive metabolic panel    CBC and differential    TSH, 3rd generation with T4 reflex    Lipid Panel with Direct LDL reflex    Selective deficiency of IgG (HCC)    Relevant Orders    Comprehensive metabolic panel    CBC and differential    TSH, 3rd generation with T4 reflex    Lipid Panel with Direct LDL reflex    Vitamin B12 deficiency    Relevant Orders    Comprehensive metabolic panel    CBC and differential    TSH, 3rd generation with T4 reflex    Lipid Panel with Direct LDL reflex    Vitamin D deficiency    Relevant Orders    Comprehensive metabolic panel    CBC and differential    TSH, 3rd generation with T4 reflex    Lipid Panel with Direct LDL reflex            Subjective:      Patient ID: Michael Patel is a 44 y o  female      Here for f/u to chronic medical conditions  Follow with rheumatology at [facility]   Having scattered joint and muscle pain  Considering to try gabapentin for pain          The following portions of the patient's history were reviewed and updated as appropriate: allergies, current medications, past family history, past medical history, past social history, past surgical history and problem list     Review of Systems   Constitutional: Negative for appetite change, chills and fever  HENT: Negative for ear pain, postnasal drip and tinnitus  Eyes: Negative for photophobia and visual disturbance  Respiratory: Negative for cough, shortness of breath and wheezing  Cardiovascular: Negative for chest pain and palpitations  Gastrointestinal: Negative for abdominal pain, constipation, diarrhea, nausea and vomiting  Endocrine: Negative for polydipsia, polyphagia and polyuria  Genitourinary: Negative for dysuria, frequency and urgency  Musculoskeletal: Positive for arthralgias and myalgias  Negative for gait problem and neck pain  Skin: Negative for rash  Neurological: Negative for dizziness, weakness and headaches  Hematological: Negative for adenopathy  Psychiatric/Behavioral: Negative for decreased concentration and suicidal ideas  The patient is not nervous/anxious            Objective:      /68 (BP Location: Right arm, Patient Position: Sitting, Cuff Size: Standard)   Pulse 72   Resp 16   Wt 83 1 kg (183 lb 3 2 oz)   BMI 31 45 kg/m²     Family History   Problem Relation Age of Onset    Other Father      exposed to chemical contaminants-dioxin    Heart attack Father     Alzheimer's disease Family     Arthritis Family     Bone cancer Family     Breast cancer Family     Heart disease Family     Hypertension Family     Stroke Family     Thyroid disease Family     Cancer Family     Skin cancer Family     No Known Problems Mother      Past Medical History:   Diagnosis Date    Abnormal blood chemistry     last assessed [id number]    Arthritis     Cervical radiculopathy     last assessed [id number]    Leukopenia     Nonrheumatic mitral valve insufficiency 06/2013    Psychogenic nonepileptic seizure     Viral gastroenteritis     Resolving, [place] diet, Stay hydrated;  last assessed [id number]    Vitamin B 12 deficiency     Vitamin D deficiency      Social History     Social History    Marital status: /Civil Union     Spouse name: N/A    Number of children: N/A    Years of education: 12     Occupational History    Not on file  Social History Main Topics    Smoking status: Never Smoker    Smokeless tobacco: Never Used    Alcohol use No    Drug use: No    Sexual activity: Yes     Partners: Male     Other Topics Concern    Not on file     Social History Narrative    Denied caffeine use           Current Outpatient Prescriptions:     Bioflavonoid Products (IDANIA C PO), Take 1 tablet by mouth daily, Disp: , Rfl:     Calcium Carbonate-Vitamin D (CALCIUM 500 + D) 500-125 MG-UNIT TABS, Take 1 tablet by mouth daily, Disp: , Rfl:     Cholecalciferol (VITAMIN D) 2000 units tablet, Take by mouth, Disp: , Rfl:     cyanocobalamin (VITAMIN B-12) 500 mcg tablet, Take 1 tablet by mouth daily, Disp: , Rfl:     cyclobenzaprine (FLEXERIL) 5 mg tablet, Take 5 mg by mouth 3 (three) times a day as needed, Disp: , Rfl: 2    HYDROcodone-acetaminophen (NORCO) 5-325 mg per tablet, Take 1 tablet by mouth every 6 (six) hours as needed for moderate pain (Not relieved by Anti-inflammatory)  Max Daily Amount: 4 tablets, Disp: 20 tablet, Rfl: 0    levothyroxine 25 mcg tablet, Take 1 tablet (25 mcg total) by mouth daily, Disp: 30 tablet, Rfl: 11    Multiple Vitamin (MULTIVITAMIN) tablet, Take 1 tablet by mouth daily  , Disp: , Rfl:   Allergies   Allergen Reactions    Cortisone Rash and GI Intolerance    Ferrous Gluconate      Other reaction(s): Constipation    Ibuprofen     Other     Penicillins Rash        Physical Exam   Constitutional: She is oriented to person, place, and time  She appears well-developed and well-nourished  HENT:   Head: Normocephalic and atraumatic  Right Ear: External ear normal    Left Ear: External ear normal    Mouth/Throat: Oropharynx is clear and moist    Eyes: Conjunctivae are normal    Neck: Normal range of motion  Neck supple  Cardiovascular: Normal rate, regular rhythm and normal heart sounds  Pulmonary/Chest: Effort normal and breath sounds normal    Abdominal: Soft  Bowel sounds are normal    Musculoskeletal: Normal range of motion  Neurological: She is alert and oriented to person, place, and time  Skin: Skin is warm and dry  Psychiatric: She has a normal mood and affect  Her behavior is normal  Judgment and thought content normal    Nursing note and vitals reviewed          Recent Results (from the past 3360 hour(s))   POCT ECG    Collection Time: 03/06/18  8:27 AM   Result Value Ref Range    Interpretation

## 2018-04-18 ENCOUNTER — OFFICE VISIT (OUTPATIENT)
Dept: UROLOGY | Facility: AMBULATORY SURGERY CENTER | Age: 38
End: 2018-04-18
Payer: COMMERCIAL

## 2018-04-18 VITALS
DIASTOLIC BLOOD PRESSURE: 64 MMHG | SYSTOLIC BLOOD PRESSURE: 110 MMHG | BODY MASS INDEX: 30.06 KG/M2 | HEIGHT: 65 IN | HEART RATE: 60 BPM | WEIGHT: 180.4 LBS

## 2018-04-18 DIAGNOSIS — N28.1 ACQUIRED COMPLEX CYST OF KIDNEY: Primary | ICD-10-CM

## 2018-04-18 PROCEDURE — 99213 OFFICE O/P EST LOW 20 MIN: CPT | Performed by: UROLOGY

## 2018-04-18 NOTE — PROGRESS NOTES
4/18/2018    Liliam Zaragoza  1980  [id number]        Assessment  Complex left renal cyst    Plan  I discussed the findings with the patient and her mother  They are somewhat concerned and would like to proceed with MRI to further evaluate the renal mass  I think this is reasonable, as is unclear whether this is in fact a Bosniak 2 lesion or perhaps has solid component with vascularity  MRI will be scheduled she will follow up to discuss results  History of Present Illness  Garrick Luna is a 44 y o  female with complex left upper pole renal cyst   She has no current complaints, other than ankle swelling  She was placed on a diuretic for this  Her renal function is normal with creatinine 0 68 as of September 2017  Last evaluation with ultrasound was performed to follow-up a Bosniak type 2 cyst on CT scan  Current ultrasound reveals vascularity in the periphery and complex nodular components  MRI was suggested              Review of Systems  Review of Systems      Past Medical History  Past Medical History:   Diagnosis Date    Abnormal blood chemistry     last assessed [id number]    Arthritis     Cervical radiculopathy     last assessed [id number]    Leukopenia     Nonrheumatic mitral valve insufficiency 06/2013    Psychogenic nonepileptic seizure     Viral gastroenteritis     Resolving, [place] diet, Stay hydrated;  last assessed [id number]    Vitamin B 12 deficiency     Vitamin D deficiency        Past Social History  Past Surgical History:   Procedure Laterality Date    HYSTERECTOMY         Past Family History  Family History   Problem Relation Age of Onset    Other Father      exposed to chemical contaminants-dioxin    Heart attack Father     Alzheimer's disease Family     Arthritis Family     Bone cancer Family     Breast cancer Family     Heart disease Family     Hypertension Family     Stroke Family     Thyroid disease Family     Cancer Family     Skin cancer Family     No Known Problems Mother        Past Social history  Social History     Social History    Marital status: /Civil Union     Spouse name: N/A    Number of children: N/A    Years of education: 12     Occupational History    Not on file  Social History Main Topics    Smoking status: Never Smoker    Smokeless tobacco: Never Used    Alcohol use No    Drug use: No    Sexual activity: Yes     Partners: Male     Other Topics Concern    Not on file     Social History Narrative    Denied caffeine use         History   Smoking Status    Never Smoker   Smokeless Tobacco    Never Used       Current Medications  Current Outpatient Prescriptions   Medication Sig Dispense Refill    Bioflavonoid Products (IDANIA C PO) Take 1 tablet by mouth daily      Calcium Carbonate-Vitamin D (CALCIUM 500 + D) 500-125 MG-UNIT TABS Take 1 tablet by mouth daily      Cholecalciferol (VITAMIN D) 2000 units tablet Take by mouth      cyanocobalamin (VITAMIN B-12) 500 mcg tablet Take 1 tablet by mouth daily      cyclobenzaprine (FLEXERIL) 5 mg tablet Take 5 mg by mouth 3 (three) times a day as needed  2    HYDROcodone-acetaminophen (NORCO) 5-325 mg per tablet Take 1 tablet by mouth every 6 (six) hours as needed for moderate pain (Not relieved by Anti-inflammatory)  Max Daily Amount: 4 tablets 20 tablet 0    levothyroxine 25 mcg tablet Take 1 tablet (25 mcg total) by mouth daily 30 tablet 11    Multiple Vitamin (MULTIVITAMIN) tablet Take 1 tablet by mouth daily  No current facility-administered medications for this visit  Allergies  Allergies   Allergen Reactions    Cortisone Rash and GI Intolerance    Ferrous Gluconate      Other reaction(s): Constipation    Ibuprofen     Other     Penicillins Rash       Past Medical History, Social History, Family History, medications and allergies were reviewed      Vitals  Vitals:    04/18/18 1438   BP: 110/64   Pulse: 60   Weight: 81 8 kg (180 lb 6 4 oz)   Height: 5' 4 5" (1 638 m)       Physical Exam  Physical Exam   Constitutional: She is oriented to person, place, and time  She appears well-developed  Pulmonary/Chest: Effort normal    Musculoskeletal: Normal range of motion  Neurological: She is alert and oriented to person, place, and time           Results  No results found for: PSA  Lab Results   Component Value Date    GLUCOSE 78 09/16/2016    CALCIUM 8 4 09/16/2016     09/16/2016    K 3 7 09/16/2016    CO2 27 09/16/2016     09/16/2016    BUN 8 09/16/2016    CREATININE 0 73 09/16/2016     Lab Results   Component Value Date    WBC 3 76 (L) 01/02/2017    HGB 12 8 01/02/2017    HCT 38 2 01/02/2017    MCV 90 01/02/2017     01/02/2017

## 2018-04-18 NOTE — LETTER
April 18, 2018     Betty Cooper, [facility] [address] [facility]  [address]  [address] [place] [address]    Patient: Renata Ward   YOB: 1980   Date of Visit: 4/18/2018       Dear Dr Sofiya Elder:    Thank you for referring Lilliam Prieto to me for evaluation  Below are my notes for this consultation  If you have questions, please do not hesitate to call me  I look forward to following your patient along with you  Sincerely,        Kaleb Mercer MD        CC: No Recipients  Kaleb Mercer MD  4/18/2018  5:23 PM  Sign at close encounter  4/18/2018    Renata Ward  1980  [id number]        Assessment  Complex left renal cyst    Plan  I discussed the findings with the patient and her mother  They are somewhat concerned and would like to proceed with MRI to further evaluate the renal mass  I think this is reasonable, as is unclear whether this is in fact a Bosniak 2 lesion or perhaps has solid component with vascularity  MRI will be scheduled she will follow up to discuss results  History of Present Illness  Agapito Morel is a 44 y o  female with complex left upper pole renal cyst   She has no current complaints, other than ankle swelling  She was placed on a diuretic for this  Her renal function is normal with creatinine 0 68 as of September 2017  Last evaluation with ultrasound was performed to follow-up a Bosniak type 2 cyst on CT scan  Current ultrasound reveals vascularity in the periphery and complex nodular components  MRI was suggested              Review of Systems  Review of Systems      Past Medical History  Past Medical History:   Diagnosis Date    Abnormal blood chemistry     last assessed [id number]    Arthritis     Cervical radiculopathy     last assessed [id number]    Leukopenia     Nonrheumatic mitral valve insufficiency 06/2013    Psychogenic nonepileptic seizure     Viral gastroenteritis     Resolving, [place] diet, Stay hydrated;  last assessed [id number]   Nazia Zamora Vitamin B 12 deficiency     Vitamin D deficiency        Past Social History  Past Surgical History:   Procedure Laterality Date    HYSTERECTOMY         Past Family History  Family History   Problem Relation Age of Onset    Other Father      exposed to chemical contaminants-dioxin    Heart attack Father     Alzheimer's disease Family     Arthritis Family     Bone cancer Family     Breast cancer Family     Heart disease Family     Hypertension Family     Stroke Family     Thyroid disease Family     Cancer Family     Skin cancer Family     No Known Problems Mother        Past Social history  Social History     Social History    Marital status: /Civil Union     Spouse name: N/A    Number of children: N/A    Years of education: 12     Occupational History    Not on file  Social History Main Topics    Smoking status: Never Smoker    Smokeless tobacco: Never Used    Alcohol use No    Drug use: No    Sexual activity: Yes     Partners: Male     Other Topics Concern    Not on file     Social History Narrative    Denied caffeine use         History   Smoking Status    Never Smoker   Smokeless Tobacco    Never Used       Current Medications  Current Outpatient Prescriptions   Medication Sig Dispense Refill    Bioflavonoid Products (IDANIA C PO) Take 1 tablet by mouth daily      Calcium Carbonate-Vitamin D (CALCIUM 500 + D) 500-125 MG-UNIT TABS Take 1 tablet by mouth daily      Cholecalciferol (VITAMIN D) 2000 units tablet Take by mouth      cyanocobalamin (VITAMIN B-12) 500 mcg tablet Take 1 tablet by mouth daily      cyclobenzaprine (FLEXERIL) 5 mg tablet Take 5 mg by mouth 3 (three) times a day as needed  2    HYDROcodone-acetaminophen (NORCO) 5-325 mg per tablet Take 1 tablet by mouth every 6 (six) hours as needed for moderate pain (Not relieved by Anti-inflammatory)   Max Daily Amount: 4 tablets 20 tablet 0    levothyroxine 25 mcg tablet Take 1 tablet (25 mcg total) by mouth daily 30 tablet 11    Multiple Vitamin (MULTIVITAMIN) tablet Take 1 tablet by mouth daily  No current facility-administered medications for this visit  Allergies  Allergies   Allergen Reactions    Cortisone Rash and GI Intolerance    Ferrous Gluconate      Other reaction(s): Constipation    Ibuprofen     Other     Penicillins Rash       Past Medical History, Social History, Family History, medications and allergies were reviewed  Vitals  Vitals:    04/18/18 1438   BP: 110/64   Pulse: 60   Weight: 81 8 kg (180 lb 6 4 oz)   Height: 5' 4 5" (1 638 m)       Physical Exam  Physical Exam   Constitutional: She is oriented to person, place, and time  She appears well-developed  Pulmonary/Chest: Effort normal    Musculoskeletal: Normal range of motion  Neurological: She is alert and oriented to person, place, and time           Results  No results found for: PSA  Lab Results   Component Value Date    GLUCOSE 78 09/16/2016    CALCIUM 8 4 09/16/2016     09/16/2016    K 3 7 09/16/2016    CO2 27 09/16/2016     09/16/2016    BUN 8 09/16/2016    CREATININE 0 73 09/16/2016     Lab Results   Component Value Date    WBC 3 76 (L) 01/02/2017    HGB 12 8 01/02/2017    HCT 38 2 01/02/2017    MCV 90 01/02/2017     01/02/2017

## 2018-05-03 DIAGNOSIS — E03.9 HYPOTHYROIDISM, UNSPECIFIED TYPE: ICD-10-CM

## 2018-05-03 RX ORDER — LEVOTHYROXINE SODIUM 0.03 MG/1
TABLET ORAL
Qty: 30 TABLET | Refills: 0 | OUTPATIENT
Start: 2018-05-03

## 2018-05-08 ENCOUNTER — TELEPHONE (OUTPATIENT)
Dept: UROLOGY | Facility: AMBULATORY SURGERY CENTER | Age: 38
End: 2018-05-08

## 2018-05-08 NOTE — TELEPHONE ENCOUNTER
Patient managed by Dr Collin Torres, seen in [place] office  Patient left message on nurse's voicemail line  Patient is scheduled for MRI next week  Patient reports she had blood work done and her Chloride level is being reported as being high  Patient questioning if this will affect having the MRI done  Per patient's message blood work was done [facility]  Spoke with Ruddy Suh at [facility] who is faxing recent blood work results to office for review  Will review results with provider and call patient back

## 2018-05-08 NOTE — TELEPHONE ENCOUNTER
Blood work reviewed and no concerning findings  [id number] Kierra Brown for patient to have MRI as scheduled  Chloride was slightly elevated  Patient should discuss these findings with her PCP

## 2018-05-08 NOTE — TELEPHONE ENCOUNTER
Spoke with patient  Patient made aware she can proceed with MRI as scheduled per Liz Graham  Patient advised to follow up with PCP regarding elevated chloride level  Patient verbalized understanding

## 2018-05-15 ENCOUNTER — HOSPITAL ENCOUNTER (OUTPATIENT)
Dept: MRI IMAGING | Facility: HOSPITAL | Age: 38
Discharge: HOME | End: 2018-05-15
Attending: UROLOGY
Payer: COMMERCIAL

## 2018-05-15 ENCOUNTER — TELEPHONE (OUTPATIENT)
Dept: HEMATOLOGY ONCOLOGY | Facility: CLINIC | Age: 38
End: 2018-05-15

## 2018-05-15 DIAGNOSIS — N28.1 ACQUIRED COMPLEX CYST OF KIDNEY: ICD-10-CM

## 2018-05-15 PROCEDURE — 74183 MRI ABD W/O CNTR FLWD CNTR: CPT

## 2018-05-15 PROCEDURE — A9585 GADOBUTROL INJECTION: HCPCS | Performed by: UROLOGY

## 2018-05-15 RX ADMIN — GADOBUTROL 8 ML: 604.72 INJECTION INTRAVENOUS at 16:59

## 2018-05-23 ENCOUNTER — OFFICE VISIT (OUTPATIENT)
Dept: UROLOGY | Facility: AMBULATORY SURGERY CENTER | Age: 38
End: 2018-05-23
Payer: COMMERCIAL

## 2018-05-23 VITALS
SYSTOLIC BLOOD PRESSURE: 120 MMHG | HEIGHT: 64 IN | HEART RATE: 60 BPM | BODY MASS INDEX: 31 KG/M2 | WEIGHT: 181.6 LBS | DIASTOLIC BLOOD PRESSURE: 72 MMHG

## 2018-05-23 DIAGNOSIS — N28.1 ACQUIRED COMPLEX CYST OF KIDNEY: Primary | ICD-10-CM

## 2018-05-23 PROCEDURE — 99213 OFFICE O/P EST LOW 20 MIN: CPT | Performed by: UROLOGY

## 2018-05-23 NOTE — PROGRESS NOTES
5/23/2018    Latoya Aguero  1980  [id number]        Assessment  Complex left renal cyst    Plan  We discussed findings  MRI evaluation reveals Bosniak 2 lesion without evidence of any enhancement  No follow-up is recommended as per radiologic reading  Patient is not certain that she is comfortable with this  We discussed that this was stable for at least 2 years  She will consider re-evaluation in 2 years rather than 1 year  She will notify us of her decision  History of Present Illness  Isael Brooks is a 43 y o  female with complex left upper pole renal cyst   She has no urinary complaints  Recent ultrasound was concerning for possible nodular area with vascularity, so MRI was ordered  Review of Systems  Review of Systems   Constitutional: Negative  HENT: Negative  Respiratory: Negative  Cardiovascular: Negative  Gastrointestinal: Negative  Genitourinary:        As per HPI   Musculoskeletal: Negative  Skin: Negative  Neurological: Negative  Hematological: Negative            Past Medical History  Past Medical History:   Diagnosis Date    Abnormal blood chemistry     last assessed [id number]    Arthritis     Cervical radiculopathy     last assessed [id number]    Leukopenia     Nonrheumatic mitral valve insufficiency 06/2013    Psychogenic nonepileptic seizure     Viral gastroenteritis     Resolving, [place] diet, Stay hydrated;  last assessed [id number]    Vitamin B 12 deficiency     Vitamin D deficiency        Past Social History  Past Surgical History:   Procedure Laterality Date    HYSTERECTOMY         Past Family History  Family History   Problem Relation Age of Onset    Other Father      exposed to chemical contaminants-dioxin    Heart attack Father     Alzheimer's disease Family     Arthritis Family     Bone cancer Family     Breast cancer Family     Heart disease Family     Hypertension Family     Stroke Family     Thyroid disease Family     Cancer Family     Skin cancer Family     No Known Problems Mother        Past Social history  Social History     Social History    Marital status: /Civil Union     Spouse name: N/A    Number of children: N/A    Years of education: 12     Occupational History    Not on file  Social History Main Topics    Smoking status: Never Smoker    Smokeless tobacco: Never Used    Alcohol use No    Drug use: No    Sexual activity: Yes     Partners: Male     Other Topics Concern    Not on file     Social History Narrative    Denied caffeine use         History   Smoking Status    Never Smoker   Smokeless Tobacco    Never Used       Current Medications  Current Outpatient Prescriptions   Medication Sig Dispense Refill    Bioflavonoid Products (IDANIA C PO) Take 1 tablet by mouth daily      Calcium Carbonate-Vitamin D (CALCIUM 500 + D) 500-125 MG-UNIT TABS Take 1 tablet by mouth daily      Cholecalciferol (VITAMIN D) 2000 units tablet Take by mouth      cyanocobalamin (VITAMIN B-12) 500 mcg tablet Take 1 tablet by mouth daily      cyclobenzaprine (FLEXERIL) 5 mg tablet Take 5 mg by mouth 3 (three) times a day as needed  2    HYDROcodone-acetaminophen (NORCO) 5-325 mg per tablet Take 1 tablet by mouth every 6 (six) hours as needed for moderate pain (Not relieved by Anti-inflammatory)  Max Daily Amount: 4 tablets 20 tablet 0    levothyroxine 25 mcg tablet Take 1 tablet (25 mcg total) by mouth daily 30 tablet 11    Multiple Vitamin (MULTIVITAMIN) tablet Take 1 tablet by mouth daily  No current facility-administered medications for this visit  Allergies  Allergies   Allergen Reactions    Cortisone Rash and GI Intolerance    Ferrous Gluconate      Other reaction(s): Constipation    Ibuprofen     Other     Penicillins Rash       Past Medical History, Social History, Family History, medications and allergies were reviewed      Vitals  Vitals:    05/23/18 1532   BP: 120/72   BP Location: Left arm   Patient Position: Sitting   Cuff Size: Adult   Pulse: 60   Weight: 82 4 kg (181 lb 9 6 oz)   Height: 5' 3 5" (1 613 m)       Physical Exam  Physical Exam   Constitutional: She is oriented to person, place, and time  She appears well-developed and well-nourished  Cardiovascular: Normal rate  Pulmonary/Chest: Effort normal    Musculoskeletal: Normal range of motion  Neurological: She is alert and oriented to person, place, and time  Skin: Skin is warm, dry and intact  Psychiatric: She has a normal mood and affect  Vitals reviewed          Results  No results found for: PSA  Lab Results   Component Value Date    GLUCOSE 78 09/16/2016    CALCIUM 8 4 09/16/2016     09/16/2016    K 3 7 09/16/2016    CO2 27 09/16/2016     09/16/2016    BUN 8 09/16/2016    CREATININE 0 73 09/16/2016     Lab Results   Component Value Date    WBC 3 76 (L) 01/02/2017    HGB 12 8 01/02/2017    HCT 38 2 01/02/2017    MCV 90 01/02/2017     01/02/2017

## 2018-05-23 NOTE — LETTER
May 23, 2018     Delta Benavidez, ferm[address] [facility]  [address]  [address] [place] [address]    Patient: Alex Livingston   YOB: 1980   Date of Visit: 5/23/2018       Dear Dr Maria Isabel Billingsley:    Thank you for referring Renate Ashley to me for evaluation  Below are my notes for this consultation  If you have questions, please do not hesitate to call me  I look forward to following your patient along with you  Sincerely,        Irene Terry MD        CC: No Recipients  Irene Terry MD  5/23/2018  4:17 PM  Sign at close encounter  5/23/2018    Alex Livingston  1980  [id number]        Assessment  Complex left renal cyst    Plan  We discussed findings  MRI evaluation reveals Bosniak 2 lesion without evidence of any enhancement  No follow-up is recommended as per radiologic reading  Patient is not certain that she is comfortable with this  We discussed that this was stable for at least 2 years  She will consider re-evaluation in 2 years rather than 1 year  She will notify us of her decision  History of Present Illness  Zohreh Swan is a 43 y o  female with complex left upper pole renal cyst   She has no urinary complaints  Recent ultrasound was concerning for possible nodular area with vascularity, so MRI was ordered  Review of Systems  Review of Systems   Constitutional: Negative  HENT: Negative  Respiratory: Negative  Cardiovascular: Negative  Gastrointestinal: Negative  Genitourinary:        As per HPI   Musculoskeletal: Negative  Skin: Negative  Neurological: Negative  Hematological: Negative            Past Medical History  Past Medical History:   Diagnosis Date    Abnormal blood chemistry     last assessed [id number]    Arthritis     Cervical radiculopathy     last assessed [id number]    Leukopenia     Nonrheumatic mitral valve insufficiency 06/2013    Psychogenic nonepileptic seizure     Viral gastroenteritis     [place] diet, Stay hydrated;  last assessed [id number]    Vitamin B 12 deficiency     Vitamin D deficiency        Past Social History  Past Surgical History:   Procedure Laterality Date    HYSTERECTOMY         Past Family History  Family History   Problem Relation Age of Onset    Other Father      exposed to chemical contaminants-dioxin    Heart attack Father     Alzheimer's disease Family     Arthritis Family     Bone cancer Family     Breast cancer Family     Heart disease Family     Hypertension Family     Stroke Family     Thyroid disease Family     Cancer Family     Skin cancer Family     No Known Problems Mother        Past Social history  Social History     Social History    Marital status: /Civil Union     Spouse name: N/A    Number of children: N/A    Years of education: 12     Occupational History    Not on file  Social History Main Topics    Smoking status: Never Smoker    Smokeless tobacco: Never Used    Alcohol use No    Drug use: No    Sexual activity: Yes     Partners: Male     Other Topics Concern    Not on file     Social History Narrative    Denied caffeine use         History   Smoking Status    Never Smoker   Smokeless Tobacco    Never Used       Current Medications  Current Outpatient Prescriptions   Medication Sig Dispense Refill    Bioflavonoid Products (IDANIA C PO) Take 1 tablet by mouth daily      Calcium Carbonate-Vitamin D (CALCIUM 500 + D) 500-125 MG-UNIT TABS Take 1 tablet by mouth daily      Cholecalciferol (VITAMIN D) 2000 units tablet Take by mouth      cyanocobalamin (VITAMIN B-12) 500 mcg tablet Take 1 tablet by mouth daily      cyclobenzaprine (FLEXERIL) 5 mg tablet Take 5 mg by mouth 3 (three) times a day as needed  2    HYDROcodone-acetaminophen (NORCO) 5-325 mg per tablet Take 1 tablet by mouth every 6 (six) hours as needed for moderate pain (Not relieved by Anti-inflammatory)   Max Daily Amount: 4 tablets 20 tablet 0    levothyroxine 25 mcg tablet Take 1 tablet (25 mcg total) by mouth daily 30 tablet 11    Multiple Vitamin (MULTIVITAMIN) tablet Take 1 tablet by mouth daily  No current facility-administered medications for this visit  Allergies  Allergies   Allergen Reactions    Cortisone Rash and GI Intolerance    Ferrous Gluconate      Other reaction(s): Constipation    Ibuprofen     Other     Penicillins Rash       Past Medical History, Social History, Family History, medications and allergies were reviewed  Vitals  Vitals:    05/23/18 1532   BP: 120/72   BP Location: Left arm   Patient Position: Sitting   Cuff Size: Adult   Pulse: 60   Weight: 82 4 kg (181 lb 9 6 oz)   Height: 5' 3 5" (1 613 m)       Physical Exam  Physical Exam   Constitutional: She is oriented to person, place, and time  She appears well-developed and well-nourished  Cardiovascular: Normal rate  Pulmonary/Chest: Effort normal    Musculoskeletal: Normal range of motion  Neurological: She is alert and oriented to person, place, and time  Skin: Skin is warm, dry and intact  Psychiatric: She has a normal mood and affect  Vitals reviewed          Results  No results found for: PSA  Lab Results   Component Value Date    GLUCOSE 78 09/16/2016    CALCIUM 8 4 09/16/2016     09/16/2016    K 3 7 09/16/2016    CO2 27 09/16/2016     09/16/2016    BUN 8 09/16/2016    CREATININE 0 73 09/16/2016     Lab Results   Component Value Date    WBC 3 76 (L) 01/02/2017    HGB 12 8 01/02/2017    HCT 38 2 01/02/2017    MCV 90 01/02/2017     01/02/2017

## 2018-06-13 ENCOUNTER — OFFICE VISIT (OUTPATIENT)
Dept: FAMILY MEDICINE CLINIC | Facility: CLINIC | Age: 38
End: 2018-06-13
Payer: COMMERCIAL

## 2018-06-13 VITALS
HEIGHT: 65 IN | DIASTOLIC BLOOD PRESSURE: 62 MMHG | SYSTOLIC BLOOD PRESSURE: 102 MMHG | RESPIRATION RATE: 12 BRPM | HEART RATE: 72 BPM | BODY MASS INDEX: 30.39 KG/M2 | WEIGHT: 182.4 LBS

## 2018-06-13 DIAGNOSIS — Z00.00 WELLNESS EXAMINATION: Primary | ICD-10-CM

## 2018-06-13 PROBLEM — G57.72 COMPLEX REGIONAL PAIN SYNDROME TYPE 2 OF LEFT LOWER EXTREMITY: Status: ACTIVE | Noted: 2018-06-13

## 2018-06-13 PROCEDURE — 99395 PREV VISIT EST AGE 18-39: CPT | Performed by: NURSE PRACTITIONER

## 2018-06-13 RX ORDER — PROMETHAZINE HYDROCHLORIDE AND CODEINE PHOSPHATE 6.25; 1 MG/5ML; MG/5ML
SOLUTION ORAL
COMMUNITY
End: 2018-08-16

## 2018-06-13 NOTE — PROGRESS NOTES
Assessment/Plan     Wellness exam     2  Patient Counseling:  --Nutrition: Stressed importance of moderation in sodium/caffeine intake, saturated fat and cholesterol, caloric balance, sufficient intake of fresh fruits, vegetables, fiber, calcium, iron, and 1 mg of folate supplement per day (for females capable of pregnancy)  --Discussed the issue of estrogen replacement, calcium supplement, and the daily use of baby aspirin  --Exercise: Stressed the importance of regular exercise  --Substance Abuse: Discussed cessation/primary prevention of tobacco, alcohol, or other drug use; driving or other dangerous activities under the influence; availability of treatment for abuse  --Sexuality: Discussed sexually transmitted diseases, partner selection, use of condoms, avoidance of unintended pregnancy  and contraceptive alternatives  --Injury prevention: Discussed safety belts, safety helmets, smoke detector, smoking near bedding or upholstery  --Dental health: Discussed importance of regular tooth brushing, flossing, and dental visits  --Immunizations reviewed  --Discussed benefits of screening colonoscopy  --After hours service discussed with patient    3  Discussed the patient's BMI with her  The BMI is above average; BMI management plan is completed  4  Follow up in one year  Subjective     Sejal Fernandez is a 43 y o  female and is here for a comprehensive physical exam  The patient reports no problems  Do you take any herbs or supplements that were not prescribed by a doctor? no  Are you taking calcium supplements? no  Are you taking aspirin daily? no     History:  LMP: No LMP recorded    Last pap date:   Abnormal pap? no  : 2  Para: 2    The following portions of the patient's history were reviewed and updated as appropriate: allergies, current medications, past family history, past medical history, past social history, past surgical history and problem list     Review of Systems  Do you have pain that bothers you in your daily life? no  Constitutional: negative  Eyes: negative  Ears, nose, mouth, throat, and face: negative  Respiratory: negative  Cardiovascular: negative  Gastrointestinal: negative  Genitourinary:negative  Integument/breast: negative  Hematologic/lymphatic: negative  Musculoskeletal:negative  Neurological: negative  Behavioral/Psych: negative  Endocrine: negative  Allergic/Immunologic: negative  Objective   /62   Pulse 72   Resp 12   Ht 5' 5" (1 651 m)   Wt 82 7 kg (182 lb 6 4 oz)   BMI 30 35 kg/m²      /62   Pulse 72   Resp 12   Ht 5' 5" (1 651 m)   Wt 82 7 kg (182 lb 6 4 oz)   BMI 30 35 kg/m²     Family History   Problem Relation Age of Onset    Other Father         exposed to chemical contaminants-dioxin    Heart attack Father     Alzheimer's disease Family     Arthritis Family     Bone cancer Family     Breast cancer Family     Heart disease Family     Hypertension Family     Stroke Family     Thyroid disease Family     Cancer Family     Skin cancer Family     No Known Problems Mother      Past Medical History:   Diagnosis Date    Abnormal blood chemistry     last assessed [id number]    Arthritis     Cervical radiculopathy     last assessed [id number]    Leukopenia     Nonrheumatic mitral valve insufficiency 06/2013    Psychogenic nonepileptic seizure     Viral gastroenteritis     Resolving, [place] diet, Stay hydrated;  last assessed [id number]    Vitamin B 12 deficiency     Vitamin D deficiency      Social History     Social History    Marital status: /Civil Union     Spouse name: N/A    Number of children: N/A    Years of education: 12     Occupational History    Not on file       Social History Main Topics    Smoking status: Never Smoker    Smokeless tobacco: Never Used    Alcohol use No    Drug use: No    Sexual activity: Yes     Partners: Male     Other Topics Concern    Not on file     Social History Narrative Denied caffeine use           Current Outpatient Prescriptions:     Bioflavonoid Products (IDANIA C PO), Take 1 tablet by mouth daily, Disp: , Rfl:     Calcium Carbonate-Vitamin D (CALCIUM 500 + D) 500-125 MG-UNIT TABS, Take 1 tablet by mouth daily, Disp: , Rfl:     Cholecalciferol (VITAMIN D) 2000 units tablet, Take by mouth, Disp: , Rfl:     cyanocobalamin (VITAMIN B-12) 500 mcg tablet, Take 1 tablet by mouth daily, Disp: , Rfl:     cyclobenzaprine (FLEXERIL) 5 mg tablet, Take 5 mg by mouth 3 (three) times a day as needed, Disp: , Rfl: 2    HYDROcodone-acetaminophen (NORCO) 5-325 mg per tablet, Take 1 tablet by mouth every 6 (six) hours as needed for moderate pain (Not relieved by Anti-inflammatory)  Max Daily Amount: 4 tablets, Disp: 20 tablet, Rfl: 0    levothyroxine 25 mcg tablet, Take 1 tablet (25 mcg total) by mouth daily, Disp: 30 tablet, Rfl: 11    Multiple Vitamin (MULTIVITAMIN) tablet, Take 1 tablet by mouth daily  , Disp: , Rfl:     Promethazine-Codeine 6 25-10 MG/5ML SOLN, promethazine 6 25 mg-codeine 10 mg/5 mL syrup, Disp: , Rfl:   Allergies   Allergen Reactions    Cortisone Rash and GI Intolerance    Ferrous Gluconate      Other reaction(s): Constipation    Ibuprofen     Other     Penicillins Rash     General Appearance:    Alert, cooperative, no distress, appears stated age   Head:    Normocephalic, without obvious abnormality, atraumatic   Eyes:    PERRL, conjunctiva/corneas clear, EOM's intact, fundi     benign, both eyes   Ears:    Normal TM's and external ear canals, both ears   Nose:   Nares normal, septum midline, mucosa normal, no drainage    or sinus tenderness   Throat:   Lips, mucosa, and tongue normal; teeth and gums normal   Neck:   Supple, symmetrical, trachea midline, no adenopathy;     thyroid:  no enlargement/tenderness/nodules; no carotid    bruit or JVD   Back:     Symmetric, no curvature, ROM normal, no CVA tenderness   Lungs:     Clear to auscultation bilaterally, respirations unlabored   Chest Wall:    No tenderness or deformity    Heart:    Regular rate and rhythm, S1 and S2 normal, no murmur, rub   or gallop   Breast Exam:    No tenderness, masses, or nipple abnormality   Abdomen:     Soft, non-tender, bowel sounds active all four quadrants,     no masses, no organomegaly   Genitalia:    Normal female without lesion, discharge or tenderness   Rectal:    Normal tone, no masses or tenderness; guaiac negative stool   Extremities:   Extremities normal, atraumatic, no cyanosis or edema   Pulses:   2+ and symmetric all extremities   Skin:   Skin color, texture, turgor normal, no rashes or lesions   Lymph nodes:   Cervical, supraclavicular, and axillary nodes normal   Neurologic:   CNII-XII intact, normal strength, sensation and reflexes     throughout

## 2018-08-01 ENCOUNTER — OFFICE VISIT (OUTPATIENT)
Dept: FAMILY MEDICINE CLINIC | Facility: CLINIC | Age: 38
End: 2018-08-01
Payer: COMMERCIAL

## 2018-08-01 VITALS
HEIGHT: 65 IN | RESPIRATION RATE: 12 BRPM | HEART RATE: 88 BPM | WEIGHT: 186 LBS | OXYGEN SATURATION: 99 % | BODY MASS INDEX: 30.99 KG/M2 | SYSTOLIC BLOOD PRESSURE: 118 MMHG | DIASTOLIC BLOOD PRESSURE: 80 MMHG
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
## 2018-08-01 PROCEDURE — 99213 OFFICE O/P EST LOW 20 MIN: CPT | Performed by: NURSE PRACTITIONER

## 2018-08-01 RX ORDER — DOXYCYCLINE 100 MG/1
100 TABLET ORAL 2 TIMES DAILY
Qty: 42 TABLET | Refills: 0 | Status: SHIPPED | OUTPATIENT
Start: 2018-08-01 | End: 2018-08-21

## 2018-08-01 NOTE — PROGRESS NOTES
Assessment/Plan:           Problem List Items Addressed This Visit     None      Visit Diagnoses     Insect bite, initial encounter    -  Primary    Relevant Medications    doxycycline (ADOXA) 100 MG tablet    Other Relevant Orders    Lyme disease, western blot            Subjective:      Patient ID: Aurora Portillo is a 43 y o  female  Here for insect bite on right forearm  Was not sure what chary it is but I think it appears to be insect bite  Saw a brown "stain" on her arm and she scratched it off  Now has annular chary that is getting larger and is warm  No pain  No itch        Insect Bite   Pertinent negatives include no arthralgias, chest pain, congestion, coughing, diaphoresis, fatigue, fever, headaches, joint swelling, myalgias, nausea, rash, sore throat or vomiting  The following portions of the patient's history were reviewed and updated as appropriate: allergies, current medications, past family history, past medical history, past social history, past surgical history and problem list     Review of Systems   Constitutional: Negative for appetite change, diaphoresis, fatigue and fever  HENT: Negative for congestion, postnasal drip, rhinorrhea, sore throat and trouble swallowing  Eyes: Negative for photophobia and visual disturbance  Respiratory: Negative for cough, shortness of breath and wheezing  Cardiovascular: Negative for chest pain, palpitations and leg swelling  Gastrointestinal: Negative for constipation, diarrhea, nausea and vomiting  Endocrine: Negative for polydipsia, polyphagia and polyuria  Genitourinary: Negative for dysuria and frequency  Musculoskeletal: Negative for arthralgias, joint swelling and myalgias  Skin: Positive for wound  Negative for rash  Neurological: Negative for dizziness, light-headedness and headaches  Hematological: Negative for adenopathy  Psychiatric/Behavioral: Negative for decreased concentration  The patient is not nervous/anxious  Objective:      /80   Pulse 88   Resp 12   Ht 5' 5" (1 651 m)   Wt 84 4 kg (186 lb)   SpO2 99%   BMI 30 95 kg/m²     Family History   Problem Relation Age of Onset    Other Father         exposed to chemical contaminants-dioxin    Heart attack Father     Alzheimer's disease Family     Arthritis Family     Bone cancer Family     Breast cancer Family     Heart disease Family     Hypertension Family     Stroke Family     Thyroid disease Family     Cancer Family     Skin cancer Family     No Known Problems Mother      Past Medical History:   Diagnosis Date    Abnormal blood chemistry     last assessed [id number]    Arthritis     Cervical radiculopathy     last assessed [id number]    Leukopenia     Nonrheumatic mitral valve insufficiency 06/2013    Psychogenic nonepileptic seizure     Viral gastroenteritis     Resolving, [place] diet, Stay hydrated;  last assessed [id number]    Vitamin B 12 deficiency     Vitamin D deficiency      Social History     Social History    Marital status: /Civil Union     Spouse name: N/A    Number of children: N/A    Years of education: 12     Occupational History    Not on file       Social History Main Topics    Smoking status: Never Smoker    Smokeless tobacco: Never Used    Alcohol use No    Drug use: No    Sexual activity: Yes     Partners: Male     Other Topics Concern    Not on file     Social History Narrative    Denied caffeine use           Current Outpatient Prescriptions:     Bioflavonoid Products (IDANIA C PO), Take 1 tablet by mouth daily, Disp: , Rfl:     Calcium Carbonate-Vitamin D (CALCIUM 500 + D) 500-125 MG-UNIT TABS, Take 1 tablet by mouth daily, Disp: , Rfl:     Cholecalciferol (VITAMIN D) 2000 units tablet, Take by mouth, Disp: , Rfl:     cyanocobalamin (VITAMIN B-12) 500 mcg tablet, Take 1 tablet by mouth daily, Disp: , Rfl:     cyclobenzaprine (FLEXERIL) 5 mg tablet, Take 5 mg by mouth 3 (three) times a day as needed, Disp: , Rfl: 2    doxycycline (ADOXA) 100 MG tablet, Take 1 tablet (100 mg total) by mouth 2 (two) times a day for 20 days, Disp: 42 tablet, Rfl: 0    HYDROcodone-acetaminophen (NORCO) 5-325 mg per tablet, Take 1 tablet by mouth every 6 (six) hours as needed for moderate pain (Not relieved by Anti-inflammatory)  Max Daily Amount: 4 tablets, Disp: 20 tablet, Rfl: 0    levothyroxine 25 mcg tablet, Take 1 tablet (25 mcg total) by mouth daily, Disp: 30 tablet, Rfl: 11    Multiple Vitamin (MULTIVITAMIN) tablet, Take 1 tablet by mouth daily  , Disp: , Rfl:     Promethazine-Codeine 6 25-10 MG/5ML SOLN, promethazine 6 25 mg-codeine 10 mg/5 mL syrup, Disp: , Rfl:   Allergies   Allergen Reactions    Cortisone Rash and GI Intolerance    Ferrous Gluconate      Other reaction(s): Constipation    Ibuprofen     Montelukast     Other     Penicillins Rash        Physical Exam   Constitutional: She is oriented to person, place, and time  She appears well-developed and well-nourished  Eyes: Conjunctivae are normal    Neck: Normal range of motion  Neck supple  Cardiovascular: Normal rate, regular rhythm and normal heart sounds  Pulmonary/Chest: Effort normal and breath sounds normal    Musculoskeletal: Normal range of motion  Neurological: She is alert and oriented to person, place, and time  Skin: Skin is warm and dry  Posterior portion of right forarm with 7cm annular bullseye area of erythema and central pinpoint 1mm scab noted  Non tender to palpation    Psychiatric: She has a normal mood and affect  Her speech is normal and behavior is normal  Judgment and thought content normal  Cognition and memory are normal    Nursing note and vitals reviewed

## 2018-08-15 ENCOUNTER — OFFICE VISIT (OUTPATIENT)
Dept: FAMILY MEDICINE CLINIC | Facility: CLINIC | Age: 38
End: 2018-08-15
Payer: COMMERCIAL

## 2018-08-15 VITALS
DIASTOLIC BLOOD PRESSURE: 80 MMHG | SYSTOLIC BLOOD PRESSURE: 118 MMHG | BODY MASS INDEX: 30.99 KG/M2 | HEIGHT: 65 IN | OXYGEN SATURATION: 98 % | WEIGHT: 186 LBS | HEART RATE: 62 BPM | RESPIRATION RATE: 12 BRPM

## 2018-08-15 DIAGNOSIS — E03.9 HYPOTHYROIDISM, UNSPECIFIED TYPE: Primary | ICD-10-CM

## 2018-08-15 DIAGNOSIS — E53.8 VITAMIN B12 DEFICIENCY: ICD-10-CM

## 2018-08-15 DIAGNOSIS — G57.72 COMPLEX REGIONAL PAIN SYNDROME TYPE 2 OF LEFT LOWER EXTREMITY: ICD-10-CM

## 2018-08-15 DIAGNOSIS — J30.1 SEASONAL ALLERGIC RHINITIS DUE TO POLLEN: ICD-10-CM

## 2018-08-15 PROCEDURE — 99214 OFFICE O/P EST MOD 30 MIN: CPT | Performed by: NURSE PRACTITIONER

## 2018-08-15 PROCEDURE — 1036F TOBACCO NON-USER: CPT | Performed by: NURSE PRACTITIONER

## 2018-08-15 PROCEDURE — 3008F BODY MASS INDEX DOCD: CPT | Performed by: NURSE PRACTITIONER

## 2018-08-15 NOTE — PROGRESS NOTES
Assessment/Plan:           Problem List Items Addressed This Visit        Endocrine    Hypothyroidism - Primary     Cont current meds  Labs              Respiratory    Allergic rhinitis     Stable  Cont current meds              Other    Vitamin B12 deficiency     Cont meds as ordered           Complex regional pain syndrome type 2 of left lower extremity     Stable on current meds                   Subjective:      Patient ID: Michael Patel is a 43 y o  female  Here for f/u to chronic medical conditions  Finishing antibiotics for lyme, awaiting testing for final diagnosis  Had bullseye rash on right forearm after gardening  Feeling well  No side affect to antibiotic  Avoiding the sun  Not due yet for f/u to [facility] for hypothyroid  Allergies stable, on otc meds  Not sure fall with become a problem           The following portions of the patient's history were reviewed and updated as appropriate: allergies, current medications, past family history, past medical history, past social history, past surgical history and problem list     Review of Systems   Constitutional: Negative for fatigue and fever  HENT: Negative for congestion, postnasal drip and rhinorrhea  Eyes: Negative for photophobia and visual disturbance  Respiratory: Negative for cough, shortness of breath and wheezing  Cardiovascular: Negative for chest pain and palpitations  Gastrointestinal: Negative for constipation, diarrhea, nausea and vomiting  Endocrine: Negative for polydipsia, polyphagia and polyuria  Genitourinary: Negative for dysuria and frequency  Musculoskeletal: Negative for arthralgias, gait problem and myalgias  Skin: Negative for rash  Neurological: Negative for dizziness, light-headedness and headaches  Hematological: Negative for adenopathy  Psychiatric/Behavioral: Negative for agitation and dysphoric mood  The patient is not nervous/anxious            Objective:      /80   Pulse 62   Resp 12   Ht 5' 5" (1 651 m)   Wt 84 4 kg (186 lb)   SpO2 98%   BMI 30 95 kg/m²   Family History   Problem Relation Age of Onset    Other Father         exposed to chemical contaminants-dioxin    Heart attack Father     Alzheimer's disease Family     Arthritis Family     Bone cancer Family     Breast cancer Family     Heart disease Family     Hypertension Family     Stroke Family     Thyroid disease Family     Cancer Family     Skin cancer Family     No Known Problems Mother      Past Medical History:   Diagnosis Date    Abnormal blood chemistry     last assessed [id number]    Arthritis     Cervical radiculopathy     last assessed [id number]    Leukopenia     Nonrheumatic mitral valve insufficiency 06/2013    Psychogenic nonepileptic seizure     Viral gastroenteritis     Resolving, [place] diet, Stay hydrated;  last assessed [id number]    Vitamin B 12 deficiency     Vitamin D deficiency      Social History     Social History    Marital status: /Civil Union     Spouse name: N/A    Number of children: N/A    Years of education: 12     Occupational History    Not on file       Social History Main Topics    Smoking status: Never Smoker    Smokeless tobacco: Never Used    Alcohol use No    Drug use: No    Sexual activity: Yes     Partners: Male     Other Topics Concern    Not on file     Social History Narrative    Denied caffeine use           Current Outpatient Prescriptions:     Bioflavonoid Products (IDANIA C PO), Take 1 tablet by mouth daily, Disp: , Rfl:     Calcium Carbonate-Vitamin D (CALCIUM 500 + D) 500-125 MG-UNIT TABS, Take 1 tablet by mouth daily, Disp: , Rfl:     Cholecalciferol (VITAMIN D) 2000 units tablet, Take by mouth, Disp: , Rfl:     cyanocobalamin (VITAMIN B-12) 500 mcg tablet, Take 1 tablet by mouth daily, Disp: , Rfl:     cyclobenzaprine (FLEXERIL) 5 mg tablet, Take 5 mg by mouth 3 (three) times a day as needed, Disp: , Rfl: 2    doxycycline (ADOXA) 100 MG tablet, Take 1 tablet (100 mg total) by mouth 2 (two) times a day for 20 days, Disp: 42 tablet, Rfl: 0    HYDROcodone-acetaminophen (NORCO) 5-325 mg per tablet, Take 1 tablet by mouth every 6 (six) hours as needed for moderate pain (Not relieved by Anti-inflammatory)  Max Daily Amount: 4 tablets, Disp: 20 tablet, Rfl: 0    levothyroxine 25 mcg tablet, Take 1 tablet (25 mcg total) by mouth daily, Disp: 30 tablet, Rfl: 11    Multiple Vitamin (MULTIVITAMIN) tablet, Take 1 tablet by mouth daily  , Disp: , Rfl:     Promethazine-Codeine 6 25-10 MG/5ML SOLN, promethazine 6 25 mg-codeine 10 mg/5 mL syrup, Disp: , Rfl:   Allergies   Allergen Reactions    Cortisone Rash and GI Intolerance    Ferrous Gluconate      Other reaction(s): Constipation    Ibuprofen     Montelukast     Other     Penicillins Rash          Physical Exam   Constitutional: She is oriented to person, place, and time  She appears well-developed and well-nourished  HENT:   Head: Normocephalic and atraumatic  Right Ear: External ear normal    Left Ear: External ear normal    Mouth/Throat: Oropharynx is clear and moist    Eyes: Conjunctivae are normal    Neck: Normal range of motion  Neck supple  No thyromegaly present  Cardiovascular: Normal rate, regular rhythm, normal heart sounds and intact distal pulses  Pulmonary/Chest: Effort normal and breath sounds normal    Abdominal: Soft  Bowel sounds are normal    Musculoskeletal: Normal range of motion  Neurological: She is alert and oriented to person, place, and time  Skin: Skin is warm and dry  Psychiatric: She has a normal mood and affect  Her behavior is normal  Judgment and thought content normal    Nursing note and vitals reviewed

## 2018-12-19 ENCOUNTER — OFFICE VISIT (OUTPATIENT)
Dept: FAMILY MEDICINE CLINIC | Facility: CLINIC | Age: 38
End: 2018-12-19
Payer: COMMERCIAL

## 2018-12-19 VITALS
WEIGHT: 177.6 LBS | HEART RATE: 78 BPM | BODY MASS INDEX: 29.59 KG/M2 | HEIGHT: 65 IN | SYSTOLIC BLOOD PRESSURE: 130 MMHG | DIASTOLIC BLOOD PRESSURE: 80 MMHG | TEMPERATURE: 98.6 F | OXYGEN SATURATION: 99 % | RESPIRATION RATE: 12 BRPM

## 2018-12-19 DIAGNOSIS — M79.7 FIBROMYALGIA: Primary | ICD-10-CM

## 2018-12-19 DIAGNOSIS — E03.8 HYPOTHYROIDISM DUE TO HASHIMOTO'S THYROIDITIS: ICD-10-CM

## 2018-12-19 DIAGNOSIS — G57.72 COMPLEX REGIONAL PAIN SYNDROME TYPE 2 OF LEFT LOWER EXTREMITY: ICD-10-CM

## 2018-12-19 DIAGNOSIS — N28.1 ACQUIRED COMPLEX CYST OF KIDNEY: ICD-10-CM

## 2018-12-19 DIAGNOSIS — E55.9 VITAMIN D DEFICIENCY: ICD-10-CM

## 2018-12-19 DIAGNOSIS — E06.3 HYPOTHYROIDISM DUE TO HASHIMOTO'S THYROIDITIS: ICD-10-CM

## 2018-12-19 DIAGNOSIS — G93.0 ARACHNOID CYST: ICD-10-CM

## 2018-12-19 DIAGNOSIS — E53.8 VITAMIN B12 DEFICIENCY: ICD-10-CM

## 2018-12-19 DIAGNOSIS — D80.3 SELECTIVE DEFICIENCY OF IGG (HCC): ICD-10-CM

## 2018-12-19 DIAGNOSIS — R89.9 ABNORMAL LABORATORY TEST: ICD-10-CM

## 2018-12-19 PROCEDURE — 1036F TOBACCO NON-USER: CPT | Performed by: NURSE PRACTITIONER

## 2018-12-19 PROCEDURE — 3008F BODY MASS INDEX DOCD: CPT | Performed by: NURSE PRACTITIONER

## 2018-12-19 PROCEDURE — 99214 OFFICE O/P EST MOD 30 MIN: CPT | Performed by: NURSE PRACTITIONER

## 2018-12-19 NOTE — PROGRESS NOTES
Assessment/Plan:           Problem List Items Addressed This Visit        Endocrine    Hypothyroidism     Cont meds              Nervous and Auditory    Arachnoid cyst     Cont f/u with neurosurgery         Complex regional pain syndrome type 2 of left lower extremity     To start gabapentin for pain            Genitourinary    Acquired complex cyst of kidney     Stable  Monitoring for now              Other    Fibromyalgia - Primary     Cont f/u with rheumatology           Relevant Orders    Comprehensive metabolic panel    TSH, 3rd generation with Free T4 reflex    CBC and differential    Lipid Panel with Direct LDL reflex    US liver    Selective deficiency of IgG (HCC)     Cont f/u with heme/onc         Relevant Orders    Comprehensive metabolic panel    TSH, 3rd generation with Free T4 reflex    CBC and differential    Lipid Panel with Direct LDL reflex    US liver    Vitamin B12 deficiency     Cont meds           Relevant Orders    Comprehensive metabolic panel    TSH, 3rd generation with Free T4 reflex    CBC and differential    Lipid Panel with Direct LDL reflex    US liver    Vitamin D deficiency     Cont current meds           Relevant Orders    Comprehensive metabolic panel    TSH, 3rd generation with Free T4 reflex    CBC and differential    Lipid Panel with Direct LDL reflex    US liver      Other Visit Diagnoses     Abnormal laboratory test        Relevant Orders    Comprehensive metabolic panel    TSH, 3rd generation with Free T4 reflex    CBC and differential    Lipid Panel with Direct LDL reflex    US liver            Subjective:      Patient ID: Jessica Guo is a 43 y o  female      Here for f/u to chronic medical conditions  To start anti-inflammatory and gabapentin from rheumatology          The following portions of the patient's history were reviewed and updated as appropriate: allergies, current medications, past family history, past medical history, past social history, past surgical history and problem list     Review of Systems   Constitutional: Negative for fatigue and fever  HENT: Negative for congestion, postnasal drip and rhinorrhea  Eyes: Negative for photophobia and visual disturbance  Respiratory: Negative for cough, shortness of breath and wheezing  Cardiovascular: Negative for chest pain and palpitations  Gastrointestinal: Negative for constipation, diarrhea, nausea and vomiting  Endocrine: Negative for polydipsia, polyphagia and polyuria  Genitourinary: Negative for dysuria, frequency and hematuria  Musculoskeletal: Positive for arthralgias, joint swelling and myalgias  Skin: Negative for rash  Neurological: Negative for dizziness, light-headedness and numbness  Hematological: Negative for adenopathy  Psychiatric/Behavioral: Negative for decreased concentration and sleep disturbance  The patient is not nervous/anxious            Objective:      /80 (BP Location: Right arm, Patient Position: Sitting, Cuff Size: Standard)   Pulse 78   Temp 98 6 °F (37 °C)   Resp 12   Ht 5' 5" (1 651 m)   Wt 80 6 kg (177 lb 9 6 oz)   SpO2 99%   BMI 29 55 kg/m²     Family History   Problem Relation Age of Onset    Other Father         exposed to chemical contaminants-dioxin    Heart attack Father     Alzheimer's disease Family     Arthritis Family     Bone cancer Family     Breast cancer Family     Heart disease Family     Hypertension Family     Stroke Family     Thyroid disease Family     Cancer Family     Skin cancer Family     No Known Problems Mother      Past Medical History:   Diagnosis Date    Abnormal blood chemistry     last assessed [id number]    Arthritis     Cervical radiculopathy     last assessed [id number]    Leukopenia     Nonrheumatic mitral valve insufficiency 06/2013    Psychogenic nonepileptic seizure     Viral gastroenteritis     Resolving, [place] diet, Stay hydrated;  last assessed [id number]    Vitamin B 12 deficiency     Vitamin D deficiency      Social History     Social History    Marital status: /Civil Union     Spouse name: N/A    Number of children: N/A    Years of education: 12     Occupational History    Not on file  Social History Main Topics    Smoking status: Never Smoker    Smokeless tobacco: Never Used    Alcohol use No    Drug use: No    Sexual activity: Yes     Partners: Male     Other Topics Concern    Not on file     Social History Narrative    Denied caffeine use           Current Outpatient Prescriptions:     Bioflavonoid Products (IDANIA C PO), Take 1 tablet by mouth daily, Disp: , Rfl:     Calcium Carbonate-Vitamin D (CALCIUM 500 + D) 500-125 MG-UNIT TABS, Take 1 tablet by mouth daily, Disp: , Rfl:     Cholecalciferol (VITAMIN D) 2000 units tablet, Take by mouth, Disp: , Rfl:     cyanocobalamin (VITAMIN B-12) 500 mcg tablet, Take 1 tablet by mouth daily, Disp: , Rfl:     cyclobenzaprine (FLEXERIL) 5 mg tablet, Take 5 mg by mouth 3 (three) times a day as needed, Disp: , Rfl: 2    HYDROcodone-acetaminophen (NORCO) 5-325 mg per tablet, Take 1 tablet by mouth every 6 (six) hours as needed for moderate pain (Not relieved by Anti-inflammatory)  Max Daily Amount: 4 tablets, Disp: 20 tablet, Rfl: 0    levothyroxine 25 mcg tablet, Take 1 tablet (25 mcg total) by mouth daily, Disp: 30 tablet, Rfl: 11    Multiple Vitamin (MULTIVITAMIN) tablet, Take 1 tablet by mouth daily  , Disp: , Rfl:   Allergies   Allergen Reactions    Cortisone Rash and GI Intolerance    Ferrous Gluconate      Other reaction(s): Constipation    Ibuprofen     Montelukast     Other     Penicillins Rash        Physical Exam   Constitutional: She is oriented to person, place, and time  She appears well-developed and well-nourished  HENT:   Head: Normocephalic and atraumatic     Right Ear: External ear normal    Left Ear: External ear normal    Nose: Nose normal    Mouth/Throat: Oropharynx is clear and moist    Eyes: Conjunctivae are normal    Neck: Normal range of motion  Neck supple  Cardiovascular: Normal rate, regular rhythm and normal heart sounds  Pulmonary/Chest: Effort normal and breath sounds normal  No respiratory distress  Abdominal: Soft  Bowel sounds are normal    Musculoskeletal: Normal range of motion  Neurological: She is alert and oriented to person, place, and time  She has normal reflexes  Skin: Skin is warm and dry  Psychiatric: She has a normal mood and affect  Her behavior is normal  Judgment and thought content normal    Nursing note and vitals reviewed

## 2018-12-21 ENCOUNTER — HOSPITAL ENCOUNTER (OUTPATIENT)
Dept: RADIOLOGY | Age: 38
Discharge: HOME | End: 2018-12-21
Payer: COMMERCIAL

## 2018-12-21 DIAGNOSIS — E53.8 VITAMIN B12 DEFICIENCY: ICD-10-CM

## 2018-12-21 DIAGNOSIS — D80.3 SELECTIVE DEFICIENCY OF IGG (HCC): ICD-10-CM

## 2018-12-21 DIAGNOSIS — M79.7 FIBROMYALGIA: ICD-10-CM

## 2018-12-21 DIAGNOSIS — E55.9 VITAMIN D DEFICIENCY: ICD-10-CM

## 2018-12-21 DIAGNOSIS — R89.9 ABNORMAL LABORATORY TEST: ICD-10-CM

## 2018-12-21 PROCEDURE — 76705 ECHO EXAM OF ABDOMEN: CPT

## 2019-01-24 DIAGNOSIS — R20.0 NUMBNESS AND TINGLING: Primary | ICD-10-CM

## 2019-01-24 DIAGNOSIS — R20.2 NUMBNESS AND TINGLING: Primary | ICD-10-CM

## 2019-02-05 DIAGNOSIS — F41.9 ANXIETY: Primary | ICD-10-CM

## 2019-03-13 ENCOUNTER — TELEPHONE (OUTPATIENT)
Dept: HEMATOLOGY ONCOLOGY | Facility: CLINIC | Age: 39
End: 2019-03-13

## 2019-03-13 NOTE — TELEPHONE ENCOUNTER
VERONICA informing patient that her appt for 04/02/19 needed to be R/S  Her new appt is 04/30/19 at 2:00 pm at the [facility] location with Dr Chintan Lama  Instructed patient to call the office to R/S her appt if the date and/or time does not work for her schedule

## 2019-04-27 DIAGNOSIS — E03.9 HYPOTHYROIDISM, UNSPECIFIED TYPE: ICD-10-CM

## 2019-04-29 DIAGNOSIS — D72.819 LEUKOPENIA, UNSPECIFIED TYPE: Primary | ICD-10-CM

## 2019-04-29 RX ORDER — LEVOTHYROXINE SODIUM 0.03 MG/1
TABLET ORAL
Qty: 30 TABLET | Refills: 11 | Status: SHIPPED | OUTPATIENT
Start: 2019-04-29 | End: 2020-03-13

## 2019-04-30 ENCOUNTER — OFFICE VISIT (OUTPATIENT)
Dept: HEMATOLOGY ONCOLOGY | Facility: CLINIC | Age: 39
End: 2019-04-30
Payer: COMMERCIAL

## 2019-04-30 VITALS
OXYGEN SATURATION: 97 % | BODY MASS INDEX: 31.1 KG/M2 | DIASTOLIC BLOOD PRESSURE: 78 MMHG | HEIGHT: 64 IN | SYSTOLIC BLOOD PRESSURE: 138 MMHG | RESPIRATION RATE: 18 BRPM | HEART RATE: 95 BPM | TEMPERATURE: 98.9 F | WEIGHT: 182.2 LBS

## 2019-04-30 DIAGNOSIS — E61.1 IRON DEFICIENCY: ICD-10-CM

## 2019-04-30 DIAGNOSIS — D72.819 LEUKOPENIA, UNSPECIFIED TYPE: Primary | ICD-10-CM

## 2019-04-30 DIAGNOSIS — D64.9 ANEMIA, UNSPECIFIED TYPE: ICD-10-CM

## 2019-04-30 PROCEDURE — 99213 OFFICE O/P EST LOW 20 MIN: CPT | Performed by: INTERNAL MEDICINE

## 2019-05-13 ENCOUNTER — TELEPHONE (OUTPATIENT)
Dept: FAMILY MEDICINE CLINIC | Facility: CLINIC | Age: 39
End: 2019-05-13

## 2019-05-13 ENCOUNTER — APPOINTMENT (EMERGENCY)
Dept: CT IMAGING | Facility: HOSPITAL | Age: 39
End: 2019-05-13
Payer: COMMERCIAL

## 2019-05-13 ENCOUNTER — HOSPITAL ENCOUNTER (EMERGENCY)
Facility: HOSPITAL | Age: 39
Discharge: HOME | End: 2019-05-13
Attending: EMERGENCY MEDICINE | Admitting: EMERGENCY MEDICINE
Payer: COMMERCIAL

## 2019-05-13 VITALS
SYSTOLIC BLOOD PRESSURE: 126 MMHG | OXYGEN SATURATION: 100 % | BODY MASS INDEX: 30.9 KG/M2 | HEART RATE: 94 BPM | RESPIRATION RATE: 18 BRPM | WEIGHT: 180 LBS | DIASTOLIC BLOOD PRESSURE: 69 MMHG | TEMPERATURE: 98.4 F

## 2019-05-13 DIAGNOSIS — R11.0 NAUSEA: ICD-10-CM

## 2019-05-13 DIAGNOSIS — R10.12 LEFT UPPER QUADRANT PAIN: ICD-10-CM

## 2019-05-13 DIAGNOSIS — N39.0 URINARY TRACT INFECTION: Primary | ICD-10-CM

## 2019-05-13 LAB
ALBUMIN SERPL BCP-MCNC: 3.9 G/DL (ref 3.5–5)
ALP SERPL-CCNC: 73 U/L (ref 46–116)
ALT SERPL W P-5'-P-CCNC: 18 U/L (ref 12–78)
ANION GAP SERPL CALCULATED.3IONS-SCNC: 12 MMOL/L (ref 4–13)
AST SERPL W P-5'-P-CCNC: 15 U/L (ref 5–45)
BACTERIA UR QL AUTO: ABNORMAL /HPF
BASOPHILS # BLD AUTO: 0.01 THOUSANDS/ΜL (ref 0–0.1)
BASOPHILS NFR BLD AUTO: 0 % (ref 0–1)
BILIRUB SERPL-MCNC: 0.6 MG/DL (ref 0.2–1)
BILIRUB UR QL STRIP: NEGATIVE
BILIRUB UR QL STRIP: NEGATIVE
BUN SERPL-MCNC: 11 MG/DL (ref 5–25)
CALCIUM SERPL-MCNC: 8.6 MG/DL (ref 8.3–10.1)
CHLORIDE SERPL-SCNC: 104 MMOL/L (ref 100–108)
CLARITY UR: CLEAR
CLARITY UR: CLEAR
CO2 SERPL-SCNC: 24 MMOL/L (ref 21–32)
COLOR UR: YELLOW
COLOR UR: YELLOW
CREAT SERPL-MCNC: 0.83 MG/DL (ref 0.6–1.3)
EOSINOPHIL # BLD AUTO: 0 THOUSAND/ΜL (ref 0–0.61)
EOSINOPHIL NFR BLD AUTO: 0 % (ref 0–6)
ERYTHROCYTE [DISTWIDTH] IN BLOOD BY AUTOMATED COUNT: 13.1 % (ref 11.6–15.1)
EXT PREG TEST URINE: NEGATIVE
GFR SERPL CREATININE-BSD FRML MDRD: 88 ML/MIN/1.73SQ M
GLUCOSE SERPL-MCNC: 106 MG/DL (ref 65–140)
GLUCOSE UR STRIP-MCNC: NEGATIVE MG/DL
GLUCOSE UR STRIP-MCNC: NEGATIVE MG/DL
HCT VFR BLD AUTO: 38.6 % (ref 34.8–46.1)
HGB BLD-MCNC: 13 G/DL (ref 11.5–15.4)
HGB UR QL STRIP.AUTO: ABNORMAL
HGB UR QL STRIP.AUTO: NEGATIVE
IMM GRANULOCYTES # BLD AUTO: 0.03 THOUSAND/UL (ref 0–0.2)
IMM GRANULOCYTES NFR BLD AUTO: 0 % (ref 0–2)
KETONES UR STRIP-MCNC: ABNORMAL MG/DL
KETONES UR STRIP-MCNC: NEGATIVE MG/DL
LEUKOCYTE ESTERASE UR QL STRIP: NEGATIVE
LEUKOCYTE ESTERASE UR QL STRIP: NEGATIVE
LYMPHOCYTES # BLD AUTO: 0.34 THOUSANDS/ΜL (ref 0.6–4.47)
LYMPHOCYTES NFR BLD AUTO: 4 % (ref 14–44)
MCH RBC QN AUTO: 30 PG (ref 26.8–34.3)
MCHC RBC AUTO-ENTMCNC: 33.7 G/DL (ref 31.4–37.4)
MCV RBC AUTO: 89 FL (ref 82–98)
MONOCYTES # BLD AUTO: 0.35 THOUSAND/ΜL (ref 0.17–1.22)
MONOCYTES NFR BLD AUTO: 5 % (ref 4–12)
NEUTROPHILS # BLD AUTO: 6.94 THOUSANDS/ΜL (ref 1.85–7.62)
NEUTS SEG NFR BLD AUTO: 91 % (ref 43–75)
NITRITE UR QL STRIP: NEGATIVE
NITRITE UR QL STRIP: NEGATIVE
NON-SQ EPI CELLS URNS QL MICRO: ABNORMAL /HPF
NRBC BLD AUTO-RTO: 0 /100 WBCS
PH UR STRIP.AUTO: 6 [PH] (ref 4.5–8)
PH UR STRIP.AUTO: 6.5 [PH] (ref 4.5–8)
PLATELET # BLD AUTO: 183 THOUSANDS/UL (ref 149–390)
PMV BLD AUTO: 11.9 FL (ref 8.9–12.7)
POTASSIUM SERPL-SCNC: 3.4 MMOL/L (ref 3.5–5.3)
PROT SERPL-MCNC: 7.3 G/DL (ref 6.4–8.2)
PROT UR STRIP-MCNC: NEGATIVE MG/DL
PROT UR STRIP-MCNC: NEGATIVE MG/DL
RBC # BLD AUTO: 4.33 MILLION/UL (ref 3.81–5.12)
RBC #/AREA URNS AUTO: ABNORMAL /HPF
SODIUM SERPL-SCNC: 140 MMOL/L (ref 136–145)
SP GR UR STRIP.AUTO: 1.01 (ref 1–1.03)
SP GR UR STRIP.AUTO: 1.02 (ref 1–1.03)
UROBILINOGEN UR QL STRIP.AUTO: 0.2 E.U./DL
UROBILINOGEN UR QL STRIP.AUTO: 0.2 E.U./DL
WBC # BLD AUTO: 7.67 THOUSAND/UL (ref 4.31–10.16)
WBC #/AREA URNS AUTO: ABNORMAL /HPF

## 2019-05-13 PROCEDURE — 99285 EMERGENCY DEPT VISIT HI MDM: CPT | Performed by: PHYSICIAN ASSISTANT

## 2019-05-13 PROCEDURE — 96374 THER/PROPH/DIAG INJ IV PUSH: CPT

## 2019-05-13 PROCEDURE — 36415 COLL VENOUS BLD VENIPUNCTURE: CPT | Performed by: PHYSICIAN ASSISTANT

## 2019-05-13 PROCEDURE — 96375 TX/PRO/DX INJ NEW DRUG ADDON: CPT

## 2019-05-13 PROCEDURE — 81001 URINALYSIS AUTO W/SCOPE: CPT

## 2019-05-13 PROCEDURE — 81025 URINE PREGNANCY TEST: CPT | Performed by: PHYSICIAN ASSISTANT

## 2019-05-13 PROCEDURE — 96361 HYDRATE IV INFUSION ADD-ON: CPT

## 2019-05-13 PROCEDURE — 80053 COMPREHEN METABOLIC PANEL: CPT | Performed by: PHYSICIAN ASSISTANT

## 2019-05-13 PROCEDURE — 74177 CT ABD & PELVIS W/CONTRAST: CPT

## 2019-05-13 PROCEDURE — 85025 COMPLETE CBC W/AUTO DIFF WBC: CPT | Performed by: PHYSICIAN ASSISTANT

## 2019-05-13 PROCEDURE — 81003 URINALYSIS AUTO W/O SCOPE: CPT

## 2019-05-13 PROCEDURE — 99284 EMERGENCY DEPT VISIT MOD MDM: CPT

## 2019-05-13 RX ORDER — ONDANSETRON 4 MG/1
4 TABLET, ORALLY DISINTEGRATING ORAL EVERY 8 HOURS PRN
Qty: 15 TABLET | Refills: 0 | Status: SHIPPED | OUTPATIENT
Start: 2019-05-13 | End: 2019-12-19

## 2019-05-13 RX ORDER — ONDANSETRON 4 MG/1
4 TABLET, ORALLY DISINTEGRATING ORAL EVERY 8 HOURS PRN
Qty: 15 TABLET | Refills: 0 | Status: SHIPPED | OUTPATIENT
Start: 2019-05-13 | End: 2019-05-13 | Stop reason: SDUPTHER

## 2019-05-13 RX ORDER — CIPROFLOXACIN 250 MG/1
500 TABLET, FILM COATED ORAL 2 TIMES DAILY
Qty: 10 TABLET | Refills: 0 | Status: SHIPPED | OUTPATIENT
Start: 2019-05-13 | End: 2019-05-18

## 2019-05-13 RX ORDER — MORPHINE SULFATE 4 MG/ML
4 INJECTION, SOLUTION INTRAMUSCULAR; INTRAVENOUS ONCE
Status: COMPLETED | OUTPATIENT
Start: 2019-05-13 | End: 2019-05-13

## 2019-05-13 RX ORDER — ONDANSETRON 2 MG/ML
4 INJECTION INTRAMUSCULAR; INTRAVENOUS ONCE
Status: COMPLETED | OUTPATIENT
Start: 2019-05-13 | End: 2019-05-13

## 2019-05-13 RX ORDER — TRAMADOL HYDROCHLORIDE 50 MG/1
50 TABLET ORAL EVERY 6 HOURS PRN
Qty: 12 TABLET | Refills: 0 | Status: SHIPPED | OUTPATIENT
Start: 2019-05-13 | End: 2019-05-23

## 2019-05-13 RX ADMIN — ONDANSETRON 4 MG: 2 INJECTION INTRAMUSCULAR; INTRAVENOUS at 16:23

## 2019-05-13 RX ADMIN — SODIUM CHLORIDE 1000 ML: 0.9 INJECTION, SOLUTION INTRAVENOUS at 16:24

## 2019-05-13 RX ADMIN — IOHEXOL 100 ML: 350 INJECTION, SOLUTION INTRAVENOUS at 17:41

## 2019-05-13 RX ADMIN — MORPHINE SULFATE 4 MG: 4 INJECTION INTRAVENOUS at 16:25

## 2019-05-14 ENCOUNTER — TELEPHONE (OUTPATIENT)
Dept: NEUROLOGY | Facility: CLINIC | Age: 39
End: 2019-05-14

## 2019-05-16 ENCOUNTER — OFFICE VISIT (OUTPATIENT)
Dept: FAMILY MEDICINE CLINIC | Facility: CLINIC | Age: 39
End: 2019-05-16
Payer: COMMERCIAL

## 2019-05-16 VITALS
SYSTOLIC BLOOD PRESSURE: 118 MMHG | OXYGEN SATURATION: 96 % | BODY MASS INDEX: 30.22 KG/M2 | WEIGHT: 177 LBS | DIASTOLIC BLOOD PRESSURE: 29 MMHG | HEIGHT: 64 IN | RESPIRATION RATE: 12 BRPM | HEART RATE: 84 BPM

## 2019-05-16 DIAGNOSIS — A08.4 VIRAL GASTROENTERITIS: Primary | ICD-10-CM

## 2019-05-16 PROBLEM — F41.9 ANXIETY: Status: ACTIVE | Noted: 2017-03-05

## 2019-05-16 PROCEDURE — 99213 OFFICE O/P EST LOW 20 MIN: CPT | Performed by: NURSE PRACTITIONER

## 2019-05-16 PROCEDURE — 3008F BODY MASS INDEX DOCD: CPT | Performed by: NURSE PRACTITIONER

## 2019-06-14 ENCOUNTER — OFFICE VISIT (OUTPATIENT)
Dept: FAMILY MEDICINE CLINIC | Facility: CLINIC | Age: 39
End: 2019-06-14
Payer: COMMERCIAL

## 2019-06-14 VITALS
DIASTOLIC BLOOD PRESSURE: 70 MMHG | OXYGEN SATURATION: 98 % | BODY MASS INDEX: 30.42 KG/M2 | HEART RATE: 65 BPM | RESPIRATION RATE: 12 BRPM | HEIGHT: 64 IN | WEIGHT: 178.2 LBS | SYSTOLIC BLOOD PRESSURE: 112 MMHG

## 2019-06-14 DIAGNOSIS — Z00.00 ANNUAL PHYSICAL EXAM: Primary | ICD-10-CM

## 2019-06-14 DIAGNOSIS — Z00.00 WELL ADULT EXAM: ICD-10-CM

## 2019-06-14 PROCEDURE — 99396 PREV VISIT EST AGE 40-64: CPT | Performed by: NURSE PRACTITIONER

## 2019-07-30 ENCOUNTER — ANNUAL EXAM (OUTPATIENT)
Dept: OBGYN CLINIC | Facility: CLINIC | Age: 39
End: 2019-07-30
Payer: COMMERCIAL

## 2019-07-30 VITALS
HEIGHT: 65 IN | DIASTOLIC BLOOD PRESSURE: 70 MMHG | BODY MASS INDEX: 30.32 KG/M2 | WEIGHT: 182 LBS | SYSTOLIC BLOOD PRESSURE: 122 MMHG

## 2019-07-30 DIAGNOSIS — Z01.419 ENCOUNTER FOR WELL WOMAN EXAM: Primary | ICD-10-CM

## 2019-07-30 DIAGNOSIS — Z12.39 BREAST SCREENING, UNSPECIFIED: ICD-10-CM

## 2019-07-30 PROCEDURE — S0612 ANNUAL GYNECOLOGICAL EXAMINA: HCPCS | Performed by: PHYSICIAN ASSISTANT

## 2019-07-30 NOTE — PROGRESS NOTES
Assessment/Plan:    No problem-specific Assessment & Plan notes found for this encounter  Diagnoses and all orders for this visit:    Encounter for well woman exam    Breast screening, unspecified  -     Mammo screening bilateral w cad; Future          Subjective:      Patient ID: Aurora Portillo is a 35 y o  female  Pt presents for her annual exam today--  She has no complaints  She has no bleeding or pelvic pain--hyster 2014  Bowel and bladder are regular  No breast concerns today  Last mammo--due for 1st  H/o WALLY  Thyroid DZ    No pap today  The following portions of the patient's history were reviewed and updated as appropriate: allergies, current medications, past family history, past medical history, past social history, past surgical history and problem list     Review of Systems   Constitutional: Negative for chills, fever and unexpected weight change  Gastrointestinal: Negative for abdominal pain, blood in stool, constipation and diarrhea  Genitourinary: Negative  Objective:      /70 (BP Location: Left arm, Patient Position: Sitting, Cuff Size: Standard)   Ht 5' 4 57" (1 64 m)   Wt 82 6 kg (182 lb)   LMP 01/18/2014 (Exact Date)   BMI 30 69 kg/m²          Physical Exam   Constitutional: She appears well-developed and well-nourished  HENT:   Head: Normocephalic and atraumatic  Neck: Normal range of motion  Pulmonary/Chest: Right breast exhibits no inverted nipple, no mass, no nipple discharge and no skin change  Left breast exhibits no inverted nipple, no mass, no nipple discharge and no skin change  Abdominal: Soft  Genitourinary: Vagina normal  Pelvic exam was performed with patient supine  There is no rash, tenderness or lesion on the right labia  There is no rash, tenderness or lesion on the left labia  Right adnexum displays no mass, no tenderness and no fullness  Left adnexum displays no mass, no tenderness and no fullness     Genitourinary Comments: Uterus and cervix surgically absent   Lymphadenopathy: No inguinal adenopathy noted on the right or left side  Nursing note and vitals reviewed

## 2019-07-30 NOTE — PROGRESS NOTES
The patient is here for a yearly  The patient had a hysterectomy so she does not need a pap smear  No bleeding or cramping  No vaginal or urinary issues  No breast concerns

## 2019-10-22 ENCOUNTER — TELEPHONE (OUTPATIENT)
Dept: HEMATOLOGY ONCOLOGY | Facility: CLINIC | Age: 39
End: 2019-10-22

## 2019-10-22 NOTE — TELEPHONE ENCOUNTER
VERONICA informing patient that her appt on 10/29/19 needed to be R/S  He new appt is 11/12/19 at 2:30 pm with LEANNE Novoa at the [facility] location  Patient was instructed to call the office to R/S if the appt did not work for her schedule

## 2019-10-30 ENCOUNTER — OFFICE VISIT (OUTPATIENT)
Dept: FAMILY MEDICINE CLINIC | Facility: CLINIC | Age: 39
End: 2019-10-30
Payer: COMMERCIAL

## 2019-10-30 VITALS
TEMPERATURE: 98.4 F | SYSTOLIC BLOOD PRESSURE: 130 MMHG | HEART RATE: 63 BPM | DIASTOLIC BLOOD PRESSURE: 80 MMHG | OXYGEN SATURATION: 98 % | RESPIRATION RATE: 16 BRPM

## 2019-10-30 DIAGNOSIS — J01.00 ACUTE NON-RECURRENT MAXILLARY SINUSITIS: Primary | ICD-10-CM

## 2019-10-30 PROCEDURE — 99213 OFFICE O/P EST LOW 20 MIN: CPT | Performed by: FAMILY MEDICINE

## 2019-10-30 RX ORDER — DOXYCYCLINE HYCLATE 100 MG
100 TABLET ORAL 2 TIMES DAILY
Qty: 14 TABLET | Refills: 0 | Status: SHIPPED | OUTPATIENT
Start: 2019-10-30 | End: 2019-11-06

## 2019-10-30 NOTE — PROGRESS NOTES
Assessment/Plan:    No problem-specific Assessment & Plan notes found for this encounter  Diagnoses and all orders for this visit:    Acute non-recurrent maxillary sinusitis  -     doxycycline hyclate (VIBRA-TABS) 100 mg tablet; Take 1 tablet (100 mg total) by mouth 2 (two) times a day for 7 days      nasal saline irrigation   Flonase daily       Subjective:      Patient ID: Jorge Ocampo is a 35 y o  female  Ear Fullness    There is pain in both ears  This is a new problem  The current episode started in the past 7 days  The problem occurs every few minutes  The problem has been waxing and waning  There has been no fever  The pain is mild  Associated symptoms include headaches  Pertinent negatives include no abdominal pain, coughing, diarrhea, ear discharge, hearing loss, neck pain, rash, rhinorrhea, sore throat or vomiting  She has tried nothing for the symptoms  The treatment provided mild relief  There is no history of a chronic ear infection, hearing loss or a tympanostomy tube  The following portions of the patient's history were reviewed and updated as appropriate: allergies, current medications, past family history, past medical history, past social history, past surgical history and problem list     Review of Systems   HENT: Negative for ear discharge, hearing loss, rhinorrhea and sore throat  Respiratory: Negative for cough  Gastrointestinal: Negative for abdominal pain, diarrhea and vomiting  Musculoskeletal: Negative for neck pain  Skin: Negative for rash  Neurological: Positive for headaches  Objective:      /80 (BP Location: Left arm, Patient Position: Sitting, Cuff Size: Standard)   Pulse 63   Temp 98 4 °F (36 9 °C) (Tympanic)   Resp 16   LMP 01/18/2014 (Exact Date)   SpO2 98%          Physical Exam   Constitutional: She appears well-developed  HENT:   Head: Normocephalic and atraumatic  Nose: Sinus tenderness present   Right sinus exhibits maxillary sinus tenderness  Mouth/Throat: No oropharyngeal exudate  Eyes: Right eye exhibits no discharge  Left eye exhibits no discharge  No scleral icterus  Cardiovascular: Normal rate and regular rhythm  Exam reveals no friction rub  No murmur heard    Pulmonary/Chest: Effort normal and breath sounds normal

## 2019-11-02 ENCOUNTER — TELEPHONE (OUTPATIENT)
Dept: OTHER | Facility: OTHER | Age: 39
End: 2019-11-02

## 2019-11-02 NOTE — TELEPHONE ENCOUNTER
Keagan Monroyal 1980  CONFIDENTIALTY NOTICE: This fax transmission is intended only for the addressee  It contains information that is legally privileged,  confidential or otherwise protected from use or disclosure  If you are not the intended recipient, you are strictly prohibited from reviewing,  disclosing, copying using or disseminating any of this information or taking any action in reliance on or regarding this information  If you have  received this fax in error, please notify us immediately by telephone so that we can arrange for its return to us  Page:   Call Id: [id number]  Health Call  Standard Call Report  Health Call  Patient Name: Keagan Strickland  Gender: Female  : 1980  Age: 35 Y 10 M 3 D  Return Phone  Number: [phone] (Home)  Address: [place]   City/State/Zip: [address] [place] [address]  Practice Name: [facility] Charged:  Physician:  Ragini Olmos Name:  Relationship To  Patient: Self  Return Phone Number: [phone] (Home)  Presenting Problem: "I noticed a red bump in the back of  my throat "  Service Type: Triage  Charged Service 1: N/A  Pharmacy Name and  Number:  Nurse Assessment  Nurse: Marcio Mendieta Date/Time: 2019 8:41:27 AM  Type of assessment required:  ---General (Adult or Child)  Duration of Current S/S  ---Today since the morning  Location/Radiation  ---Mouth  Temperature (F) and route:  ---Denies fever  Symptom Specific Meds (Dose/Time):  ---None  Other S/S  ---Noticed a red small bump in the back of her throat  Denies pain in her throat or sore  throat  States she just noticed this when brushing her teeth  No difficulty swallowing  No widespread rashes or hives  Pain Scale on scale of 1-10, 10 being the worst:  ---No pain  Symptom progression:  ---same  Intake and Output  Keagan Strickland 1980  CONFIDENTIALTY NOTICE: This fax transmission is intended only for the addressee   It contains information that is legally privileged,  confidential or otherwise protected from use or disclosure  If you are not the intended recipient, you are strictly prohibited from reviewing,  disclosing, copying using or disseminating any of this information or taking any action in reliance on or regarding this information  If you have  received this fax in error, please notify us immediately by telephone so that we can arrange for its return to us  Page: 2 of 2  Call Id: [id number]  Nurse Assessment  ---Drinking normally / Normally  LMP/ Pregnancy:  ---lmp: hysterectomy  Breastfeeding  ---No  Last Exam/Treatment:  ---10/30/2019 in the office for sinus infection- put on doxycycline  Protocols  Protocol Title Nurse Date/Time  No Guideline Available - Sick Adult Call Valentina Sellers 11/2/2019 8:56:27 AM  Question Caller Affirmed  Disp  Time Disposition Final User  11/2/2019 SIVAKUMAR Mercado 99, RN, Lucianojoan Marie  11/2/2019 8:57:29 AM RN Triaged Yes Justin Casiano, JOHN, [facility] Advice Given Per Protocol  HOME CARE: You should be able to treat this at home  CALL BACK IF: * New symptoms develop * You become worse  CARE  ADVICE per nursing judgment or reference (No Guideline Available - Sick Adult Call;  Adult)  Caller Understands: Yes  Caller Disagree/Comply: Comply  PreDisposition: Unsure

## 2019-11-04 DIAGNOSIS — J01.00 ACUTE NON-RECURRENT MAXILLARY SINUSITIS: Primary | ICD-10-CM

## 2019-11-04 RX ORDER — AZITHROMYCIN 250 MG/1
TABLET, FILM COATED ORAL
Qty: 6 TABLET | Refills: 0 | Status: SHIPPED | OUTPATIENT
Start: 2019-11-04 | End: 2019-11-07

## 2019-11-07 ENCOUNTER — HOSPITAL ENCOUNTER (OUTPATIENT)
Dept: MAMMOGRAPHY | Facility: HOSPITAL | Age: 39
Discharge: HOME | End: 2019-11-07
Payer: COMMERCIAL

## 2019-11-07 VITALS — HEIGHT: 65 IN | BODY MASS INDEX: 30.32 KG/M2 | WEIGHT: 182 LBS

## 2019-11-07 DIAGNOSIS — Z12.39 BREAST SCREENING: ICD-10-CM

## 2019-11-07 PROCEDURE — 77067 SCR MAMMO BI INCL CAD: CPT

## 2019-11-11 ENCOUNTER — TELEPHONE (OUTPATIENT)
Dept: HEMATOLOGY ONCOLOGY | Facility: CLINIC | Age: 39
End: 2019-11-11

## 2019-11-11 NOTE — TELEPHONE ENCOUNTER
I called the patient to remind her that she should have her blood work done prior her appointment  I left a voicemail

## 2019-11-11 NOTE — TELEPHONE ENCOUNTER
Pt stated that she got labs done last month because she was suppose to come in last month  Her labs were drawn at [facility]  Please call them to retrieve the records

## 2019-11-12 ENCOUNTER — OFFICE VISIT (OUTPATIENT)
Dept: HEMATOLOGY ONCOLOGY | Facility: CLINIC | Age: 39
End: 2019-11-12
Payer: COMMERCIAL

## 2019-11-12 VITALS
WEIGHT: 182 LBS | SYSTOLIC BLOOD PRESSURE: 110 MMHG | BODY MASS INDEX: 30.32 KG/M2 | TEMPERATURE: 98.7 F | DIASTOLIC BLOOD PRESSURE: 60 MMHG | HEIGHT: 65 IN | HEART RATE: 75 BPM | RESPIRATION RATE: 16 BRPM | OXYGEN SATURATION: 99 %

## 2019-11-12 DIAGNOSIS — D72.819 LEUKOPENIA, UNSPECIFIED TYPE: Primary | ICD-10-CM

## 2019-11-12 PROCEDURE — 99213 OFFICE O/P EST LOW 20 MIN: CPT | Performed by: PHYSICIAN ASSISTANT

## 2019-11-12 NOTE — PROGRESS NOTES
Hematology/Oncology Outpatient Follow-up  Erica Shannon 35 y o  female 1980 [id number]    Date:  11/12/2019      Assessment and Plan:    1  Leukopenia, unspecified type  58-year-old female presents for follow-up regarding history of leukopenia  She also had a previous history of iron deficiency anemia however this has since resolved as the cause has been corrected, which was menorrhagia, her leukopenia is very mild  Differential has never been affected  Please see below  Her absolute neutrophils have remained above 2000  Discussed her leukopenia likely represents a statistical variant  Continue follow-up at a yearly basis  If this continues to be stable, the PCP can follow  Follow-up in 1 year with repeat CBC     - CBC and differential; Future      HPI:  58-year-old female presents for follow-up regarding history of iron deficiency anemia  Her iron deficiency anemia was found to be secondary to menorrhagia  She had a NEVILLE in therefore obviously does not have any more menorrhagia  She also has been followed for low WBC  Please see trend from 2017 till now as below  Differential has remained normal     Interval history: recent fracture of right pinky toe  She is following with ortho and in a boot  She had her 1st mammogram   Unrevealing  ROS: Review of Systems   Constitutional: Positive for fatigue  Negative for activity change, appetite change, chills, fever and unexpected weight change  Respiratory: Negative for cough and shortness of breath  Cardiovascular: Negative for chest pain, palpitations and leg swelling  Gastrointestinal: Negative for abdominal pain, blood in stool, constipation, diarrhea, nausea and vomiting  Genitourinary: Negative for difficulty urinating and dysuria  Musculoskeletal: Negative for arthralgias  Skin: Negative  Neurological: Negative for dizziness, weakness, light-headedness, numbness and headaches  Hematological: Negative  Psychiatric/Behavioral: Negative          Past Medical History:   Diagnosis Date    Abnormal blood chemistry     last assessed [id number]    Arthritis     Cervical radiculopathy     last assessed [id number]    Disease of thyroid gland     Leukopenia     Nonrheumatic mitral valve insufficiency 06/2013    Psychogenic nonepileptic seizure     Viral gastroenteritis     Resolving, [place] diet, Stay hydrated;  last assessed [id number]    Vitamin B 12 deficiency     Vitamin D deficiency        Past Surgical History:   Procedure Laterality Date    HYSTERECTOMY         Social History     Socioeconomic History    Marital status: /Civil Union     Spouse name: Not on file    Number of children: Not on file    Years of education: 15    Highest education level: Not on file   Occupational History    Not on file   Social Needs    Financial resource strain: Not on file    Food insecurity:     Worry: Not on file     Inability: Not on file    Transportation needs:     Medical: Not on file     Non-medical: Not on file   Tobacco Use    Smoking status: Never Smoker    Smokeless tobacco: Never Used   Substance and Sexual Activity    Alcohol use: No    Drug use: No    Sexual activity: Yes     Partners: Male     Birth control/protection: Surgical   Lifestyle    Physical activity:     Days per week: Not on file     Minutes per session: Not on file    Stress: Not on file   Relationships    Social connections:     Talks on phone: Not on file     Gets together: Not on file     Attends [employer] service: Not on file     Active member of club or organization: Not on file     Attends meetings of clubs or organizations: Not on file     Relationship status: Not on file    Intimate partner violence:     Fear of current or ex partner: Not on file     Emotionally abused: Not on file     Physically abused: Not on file     Forced sexual activity: Not on file   Other Topics Concern    Not on file   Social History Narrative Denied caffeine use       Family History   Problem Relation Age of Onset    Other Father         exposed to chemical contaminants-dioxin    Heart attack Father     Alzheimer's disease Family     Arthritis Family     Bone cancer Family     Breast cancer Family     Heart disease Family     Hypertension Family     Stroke Family     Thyroid disease Family     Cancer Family     Skin cancer Family     No Known Problems Mother     Breast cancer Maternal Grandmother 39    Cancer Paternal Grandfather 66        lung    No Known Problems Son     No Known Problems Son        Allergies   Allergen Reactions    Cortisone Rash and GI Intolerance    Ferrous Gluconate      Other reaction(s): Constipation    Ibuprofen     Montelukast     Other      seasonal    Penicillins Rash         Current Outpatient Medications:     Bioflavonoid Products (IDANIA C PO), Take 1 tablet by mouth daily, Disp: , Rfl:     Calcium Carbonate-Vitamin D (CALCIUM 500 + D) 500-125 MG-UNIT TABS, Take 1 tablet by mouth daily, Disp: , Rfl:     Cholecalciferol (VITAMIN D) 2000 units tablet, Take by mouth, Disp: , Rfl:     cyanocobalamin (VITAMIN B-12) 500 mcg tablet, Take 1 tablet by mouth daily, Disp: , Rfl:     cyclobenzaprine (FLEXERIL) 5 mg tablet, Take 5 mg by mouth 3 (three) times a day as needed, Disp: , Rfl: 2    HYDROcodone-acetaminophen (NORCO) 5-325 mg per tablet, Take 1 tablet by mouth every 6 (six) hours as needed for moderate pain (Not relieved by Anti-inflammatory)  Max Daily Amount: 4 tablets (Patient not taking: Reported on 7/30/2019), Disp: 20 tablet, Rfl: 0    levothyroxine 25 mcg tablet, TAKE ONE TABLET BY MOUTH EVERY DAY, Disp: 30 tablet, Rfl: 11    Multiple Vitamin (MULTIVITAMIN) tablet, Take 1 tablet by mouth daily  , Disp: , Rfl:     ondansetron (ZOFRAN ODT) 4 mg disintegrating tablet, Take 1 tablet (4 mg total) by mouth every 8 (eight) hours as needed for nausea for up to 15 doses (Patient not taking: Reported on 7/30/2019), Disp: 15 tablet, Rfl: 0    sertraline (ZOLOFT) 50 mg tablet, Take 1 tablet (50 mg total) by mouth daily (Patient not taking: Reported on 7/30/2019), Disp: 30 tablet, Rfl: 5      Physical Exam:  LMP 01/18/2014 (Exact Date)     Physical Exam   Constitutional: She is oriented to person, place, and time  She appears well-developed and well-nourished  No distress  HENT:   Head: Normocephalic and atraumatic  Eyes: Conjunctivae are normal  No scleral icterus  Neck: Normal range of motion  Neck supple  Cardiovascular: Normal rate, regular rhythm and normal heart sounds  No murmur heard  Pulmonary/Chest: Effort normal and breath sounds normal  No respiratory distress  Abdominal: Soft  There is no tenderness  Musculoskeletal: Normal range of motion  She exhibits no edema or tenderness  Lymphadenopathy:     She has no cervical adenopathy  Neurological: She is alert and oriented to person, place, and time  No cranial nerve deficit  Skin: Skin is warm and dry  Psychiatric: She has a normal mood and affect  Labs:  Component Name  9/17/2019 9/17/2019 2/13/2018 9/27/2017     12 3   12 6 12 7   36 1   36 7 37 2   3 7 (L)   3 7 (L) 8 4   4 06   4 13 4 20   175   166 166   11 1   10 7 11 1   89   89 89   30 3   30 6 30 2   34 1   34 3 34 1   13 5   13 8 14 0     1 0         Component Name  9/17/2019 2/13/2018 9/27/2017       Not performed Not performed   AUTO AUTO AUTO   2 0 2 1 5 7   1 3 1 2 1 8   0 4 0 4 0 8   0 0 0 0 0 0   0 0 0 0 0 0   53 56 68   35 31 22   10 11 10   1 1 0   1 1 0   Morphology   Differential Type   Absolute Neutrophils   Absolute Lymphocytes   Absolute Monocytes   Absolute Eosinophils   Absolute Basophils   Neutrophils   Lymphocytes   Monocytes   Eosinophils   Basophils     Patient voiced understanding and agreement in the above discussion  Aware to contact our office with questions/symptoms in the interim       This note has been generated by voice recognition software system  Therefore, there may be spelling, grammar, and or syntax errors  Please contact if questions arise

## 2019-12-19 ENCOUNTER — OFFICE VISIT (OUTPATIENT)
Dept: FAMILY MEDICINE CLINIC | Facility: CLINIC | Age: 39
End: 2019-12-19
Payer: COMMERCIAL

## 2019-12-19 VITALS
BODY MASS INDEX: 29.66 KG/M2 | OXYGEN SATURATION: 99 % | HEIGHT: 65 IN | WEIGHT: 178 LBS | HEART RATE: 78 BPM | TEMPERATURE: 98.4 F | RESPIRATION RATE: 16 BRPM | SYSTOLIC BLOOD PRESSURE: 118 MMHG | DIASTOLIC BLOOD PRESSURE: 82 MMHG

## 2019-12-19 DIAGNOSIS — J02.9 SORE THROAT: Primary | ICD-10-CM

## 2019-12-19 LAB — S PYO AG THROAT QL: NEGATIVE

## 2019-12-19 PROCEDURE — 87070 CULTURE OTHR SPECIMN AEROBIC: CPT | Performed by: NURSE PRACTITIONER

## 2019-12-19 PROCEDURE — 87880 STREP A ASSAY W/OPTIC: CPT | Performed by: NURSE PRACTITIONER

## 2019-12-19 PROCEDURE — 3008F BODY MASS INDEX DOCD: CPT | Performed by: NURSE PRACTITIONER

## 2019-12-19 PROCEDURE — 1036F TOBACCO NON-USER: CPT | Performed by: NURSE PRACTITIONER

## 2019-12-19 PROCEDURE — 99213 OFFICE O/P EST LOW 20 MIN: CPT | Performed by: NURSE PRACTITIONER

## 2019-12-19 NOTE — ASSESSMENT & PLAN NOTE
Most likely viral  Salt water gargles  Saline nasal spray  Tylenol or motrin prn for pain per package instructions  Throat culture

## 2019-12-19 NOTE — PROGRESS NOTES
Assessment/Plan:           Problem List Items Addressed This Visit        Other    Sore throat - Primary     Most likely viral  Salt water gargles  Saline nasal spray  Tylenol or motrin prn for pain per package instructions  Throat culture         Relevant Orders    POCT rapid strepA (Completed)    Throat culture            Subjective:      Patient ID: Leroy Mcgovern is a 35 y o  female  Here for illness  Sore throat, sinus pain and pressure, congestion  Slight cough this am  Sick since last weekend  Works at a school and students sick with strep  Body aches  No fever or  Ha  No ear pain        The following portions of the patient's history were reviewed and updated as appropriate: allergies, current medications, past family history, past medical history, past social history, past surgical history and problem list     Review of Systems   Constitutional: Positive for fatigue  Negative for chills and fever  HENT: Positive for congestion, postnasal drip, rhinorrhea, sinus pressure, sinus pain and sore throat  Negative for ear pain  Respiratory: Positive for cough  Negative for shortness of breath and wheezing  Cardiovascular: Negative for chest pain and palpitations  Gastrointestinal: Negative for constipation and diarrhea  Genitourinary: Negative for dysuria and frequency  Musculoskeletal: Positive for myalgias  Negative for arthralgias  Skin: Negative for rash  Neurological: Negative for dizziness, light-headedness and headaches  Hematological: Negative for adenopathy  Psychiatric/Behavioral: Negative for dysphoric mood  The patient is not nervous/anxious            Objective:      /82 (BP Location: Left arm, Patient Position: Sitting, Cuff Size: Standard)   Pulse 78   Temp 98 4 °F (36 9 °C) (Tympanic)   Resp 16   Ht 5' 4 5" (1 638 m)   Wt 80 7 kg (178 lb)   LMP 01/18/2014 (Exact Date)   SpO2 99%   BMI 30 08 kg/m²     Family History   Problem Relation Age of Onset    Other Father         exposed to chemical contaminants-dioxin    Heart attack Father     Alzheimer's disease Family     Arthritis Family     Bone cancer Family     Breast cancer Family     Heart disease Family     Hypertension Family     Stroke Family     Thyroid disease Family     Cancer Family     Skin cancer Family     No Known Problems Mother     Breast cancer Maternal Grandmother 39    Cancer Paternal Grandfather 66        lung    No Known Problems Son     No Known Problems Son      Past Medical History:   Diagnosis Date    Abnormal blood chemistry     last assessed [id number]    Arthritis     Cervical radiculopathy     last assessed [id number]    Disease of thyroid gland     Leukopenia     Nonrheumatic mitral valve insufficiency 06/2013    Psychogenic nonepileptic seizure     Viral gastroenteritis     Resolving, [place] diet, Stay hydrated;  last assessed [id number]    Vitamin B 12 deficiency     Vitamin D deficiency      Social History     Socioeconomic History    Marital status: /Civil Union     Spouse name: Not on file    Number of children: Not on file    Years of education: 15    Highest education level: Not on file   Occupational History    Not on file   Social Needs    Financial resource strain: Not on file    Food insecurity:     Worry: Not on file     Inability: Not on file    Transportation needs:     Medical: Not on file     Non-medical: Not on file   Tobacco Use    Smoking status: Never Smoker    Smokeless tobacco: Never Used   Substance and Sexual Activity    Alcohol use: No    Drug use: No    Sexual activity: Yes     Partners: Male     Birth control/protection: Surgical   Lifestyle    Physical activity:     Days per week: Not on file     Minutes per session: Not on file    Stress: Not on file   Relationships    Social connections:     Talks on phone: Not on file     Gets together: Not on file     Attends Rastafarian service: Not on file     Active member of club or organization: Not on file     Attends meetings of clubs or organizations: Not on file     Relationship status: Not on file    Intimate partner violence:     Fear of current or ex partner: Not on file     Emotionally abused: Not on file     Physically abused: Not on file     Forced sexual activity: Not on file   Other Topics Concern    Not on file   Social History Narrative    Denied caffeine use       Current Outpatient Medications:     Bioflavonoid Products (IDANIA C PO), Take 1 tablet by mouth daily, Disp: , Rfl:     Calcium Carbonate-Vitamin D (CALCIUM 500 + D) 500-125 MG-UNIT TABS, Take 1 tablet by mouth daily, Disp: , Rfl:     Cholecalciferol (VITAMIN D) 2000 units tablet, Take by mouth, Disp: , Rfl:     cyanocobalamin (VITAMIN B-12) 500 mcg tablet, Take 1 tablet by mouth daily, Disp: , Rfl:     cyclobenzaprine (FLEXERIL) 5 mg tablet, Take 5 mg by mouth 3 (three) times a day as needed, Disp: , Rfl: 2    levothyroxine 25 mcg tablet, TAKE ONE TABLET BY MOUTH EVERY DAY, Disp: 30 tablet, Rfl: 11    Multiple Vitamin (MULTIVITAMIN) tablet, Take 1 tablet by mouth daily  , Disp: , Rfl:     sertraline (ZOLOFT) 50 mg tablet, Take 1 tablet (50 mg total) by mouth daily (Patient not taking: Reported on 7/30/2019), Disp: 30 tablet, Rfl: 5  Allergies   Allergen Reactions    Cortisone Rash and GI Intolerance    Ferrous Gluconate      Other reaction(s): Constipation    Ibuprofen     Montelukast     Other      seasonal    Penicillins Rash        Physical Exam   Constitutional: She is oriented to person, place, and time  She appears well-developed and well-nourished  HENT:   Head: Normocephalic  Right Ear: External ear normal    Left Ear: External ear normal    Mouth/Throat: Mucous membranes are normal    Eyes: Conjunctivae are normal    Neck: Normal range of motion  Neck supple  No thyromegaly present  Cardiovascular: Normal rate, regular rhythm and normal heart sounds     Pulmonary/Chest: Effort normal and breath sounds normal    Abdominal: Soft  Bowel sounds are normal    Musculoskeletal: Normal range of motion  Neurological: She is alert and oriented to person, place, and time  Skin: Skin is warm and dry  Capillary refill takes less than 2 seconds  Psychiatric: She has a normal mood and affect  Her behavior is normal  Judgment and thought content normal    Nursing note and vitals reviewed        Recent Results (from the past 168 hour(s))   POCT rapid strepA    Collection Time: 12/19/19  8:58 AM   Result Value Ref Range     RAPID STREP A Negative Negative

## 2019-12-21 LAB — BACTERIA THROAT CULT: NORMAL

## 2019-12-23 DIAGNOSIS — J06.9 ACUTE UPPER RESPIRATORY INFECTION: Primary | ICD-10-CM

## 2019-12-23 RX ORDER — SULFAMETHOXAZOLE AND TRIMETHOPRIM 800; 160 MG/1; MG/1
1 TABLET ORAL EVERY 12 HOURS SCHEDULED
Qty: 20 TABLET | Refills: 0 | Status: SHIPPED | OUTPATIENT
Start: 2019-12-23 | End: 2020-01-02

## 2020-02-04 DIAGNOSIS — E06.3 HYPOTHYROIDISM DUE TO HASHIMOTO'S THYROIDITIS: Primary | ICD-10-CM

## 2020-02-04 DIAGNOSIS — E03.8 HYPOTHYROIDISM DUE TO HASHIMOTO'S THYROIDITIS: Primary | ICD-10-CM

## 2020-02-10 ENCOUNTER — TELEPHONE (OUTPATIENT)
Dept: HEMATOLOGY ONCOLOGY | Facility: CLINIC | Age: 40
End: 2020-02-10

## 2020-02-10 NOTE — TELEPHONE ENCOUNTER
I called the patient and left her a message to inform her about the change in Dr Blanca Bustamante schedule to see patients at the [facility], moving from Tuesdays to Fridays, starting the end of February  Patient was rescheduled for 11/120 @ 2:40 pm at [facility]  I then verbalized if for some reason the appointment will not work to call the office back to reschedule

## 2020-03-03 ENCOUNTER — OFFICE VISIT (OUTPATIENT)
Dept: FAMILY MEDICINE CLINIC | Facility: CLINIC | Age: 40
End: 2020-03-03
Payer: COMMERCIAL

## 2020-03-03 VITALS
SYSTOLIC BLOOD PRESSURE: 120 MMHG | HEIGHT: 65 IN | DIASTOLIC BLOOD PRESSURE: 82 MMHG | BODY MASS INDEX: 30.75 KG/M2 | TEMPERATURE: 98.8 F | HEART RATE: 72 BPM | WEIGHT: 184.6 LBS

## 2020-03-03 DIAGNOSIS — I34.0 NON-RHEUMATIC MITRAL REGURGITATION: ICD-10-CM

## 2020-03-03 DIAGNOSIS — D80.3 SELECTIVE DEFICIENCY OF IGG (HCC): ICD-10-CM

## 2020-03-03 DIAGNOSIS — D70.8 AUTO IMMUNE NEUTROPENIA (HCC): ICD-10-CM

## 2020-03-03 DIAGNOSIS — G93.0 ARACHNOID CYST: ICD-10-CM

## 2020-03-03 DIAGNOSIS — Z23 IMMUNIZATION DUE: ICD-10-CM

## 2020-03-03 DIAGNOSIS — N28.1 ACQUIRED COMPLEX CYST OF KIDNEY: ICD-10-CM

## 2020-03-03 DIAGNOSIS — E03.8 HYPOTHYROIDISM DUE TO HASHIMOTO'S THYROIDITIS: Primary | ICD-10-CM

## 2020-03-03 DIAGNOSIS — E06.3 HYPOTHYROIDISM DUE TO HASHIMOTO'S THYROIDITIS: Primary | ICD-10-CM

## 2020-03-03 DIAGNOSIS — J30.1 SEASONAL ALLERGIC RHINITIS DUE TO POLLEN: ICD-10-CM

## 2020-03-03 DIAGNOSIS — H90.6 MIXED CONDUCTIVE AND SENSORINEURAL HEARING LOSS OF BOTH EARS: ICD-10-CM

## 2020-03-03 PROBLEM — J02.9 SORE THROAT: Status: RESOLVED | Noted: 2019-12-19 | Resolved: 2020-03-03

## 2020-03-03 PROCEDURE — 1036F TOBACCO NON-USER: CPT | Performed by: FAMILY MEDICINE

## 2020-03-03 PROCEDURE — 99214 OFFICE O/P EST MOD 30 MIN: CPT | Performed by: FAMILY MEDICINE

## 2020-03-03 RX ORDER — CETIRIZINE HYDROCHLORIDE 10 MG/1
10 TABLET ORAL DAILY
COMMUNITY

## 2020-03-03 NOTE — ASSESSMENT & PLAN NOTE
Chronic, stable followed by Cardiology Dr Chriss Mancia  Last visit 2018, upcoming visit this spring  Echo pending

## 2020-03-03 NOTE — ASSESSMENT & PLAN NOTE
Chronic, symptomatic  Patient reports hair loss, weight gain in last several months  Recheck TSH/T4  Continue levothyroxine 25mcg QD detailed exam

## 2020-03-03 NOTE — ASSESSMENT & PLAN NOTE
Left > Right; hearing aide in left ear  Follows with Dr Colonel Lockwood  History of tympanostomy tubes with eventual graft needed as child

## 2020-03-03 NOTE — PROGRESS NOTES
Lalita Penn 1980 female MRN: [id number]  [facility]    Visit to Establish Care: Family Medicine    Assessment/Plan     Mixed conductive and sensorineural hearing loss of both ears  Left > Right; hearing aide in left ear  Follows with Dr Cal Shah  History of tympanostomy tubes with eventual graft needed as child    Hypothyroidism  Chronic, symptomatic  Patient reports hair loss, weight gain in last several months  Recheck TSH/T4  Continue levothyroxine 25mcg QD    Allergic rhinitis  Seasonal  Controlled with OTC Zyrtec    Non-rheumatic mitral regurgitation  Chronic, stable followed by Cardiology Dr Marla Peters  Last visit 2018, upcoming visit this spring  Echo pending      Arachnoid cyst  Per patient, released from neurosurgical f/u    Acquired complex cyst of kidney  Stable  Monitored by urology Dr Ramy Roy    Selective deficiency of IgG ([facility] [address] )  Continue f/u with hematology    Auto immune neutropenia [facility])  Continue hematology f/u    Maribel Elder was seen today for establish care  Diagnoses and all orders for this visit:    Hypothyroidism due to Hashimoto's thyroiditis  -     TSH, 3rd generation with Free T4 reflex; Future    Immunization due  -     Cancel: TDAP VACCINE GREATER THAN OR EQUAL TO 8YO IM    Mixed conductive and sensorineural hearing loss of both ears    Seasonal allergic rhinitis due to pollen    Non-rheumatic mitral regurgitation    Acquired complex cyst of kidney    Arachnoid cyst    Selective deficiency of IgG (HCC)    Auto immune neutropenia (HCC)        In addition to the above, the patient was counseled on general preventative health care subjects, including but not limited to:  - Nutrition, healthy weight, aerobic and weight-bearing exercise  - Mental health, social support, and self care  - Advised of the importance of dental hygiene and routine dental visits   - Patient made aware of  services at the office      Future Appointments   Date Time Provider [place]   4/30/2020  3:00 PM Hetal Carrillo MD CARD JOHNNIE [facility]   6/4/2020  8:30 AM Juanito Isbell MD FAM MED JOANNA [facility]   6/15/2020  8:00 AM DENNY Cuellar ENRIQUE [facility]   7/31/2020 11:00 AM Kathryn Saha PA-C OBJOLENE [facility]   11/20/2020  2:40 PM Yanelis Lackey MD FABRICIO ONC [facility]         Juanito Isbell MD  [address]  3/4/2020      Please be aware that this note contains text that was dictated and there may be errors pertaining to "sound-alike" words during the dictation process  SUBJECTIVE    HPI:  Dennis Fabry is a 35 y o  female who presented to establish care with this family medicine practice  She follows with multiple specialists:  - Rheumatology for fibromyalgia @ [facility]  - Urology for renal cysts @ [facility] Dr Pato Duncan  - Hematology for leukopenia @ [facility] Dr Jami Mcdaniels  - OBGYLORI for preventive GYN care @ [facility] Dr Annie Magaña  - Neuro for arachnoid cyst @ [facility] Dr Magaly Cordero  - Cardiology for non-rheum mitral regurg @ [facility] Dr Severo Steven  - ENT for hearing loss and scarred TM @ [facility] Dr Adelso Bassett    She notes she has recently gained weight, dry skin, hair loss, and is concerned she may not be stable on thyroid meds  Is compliant, takes on empty stomach daily  Review of Systems   Constitutional: Positive for unexpected weight change (weight gain)  Negative for activity change, chills and fever  HENT: Negative for congestion, rhinorrhea and sore throat  Eyes: Negative for visual disturbance  Respiratory: Negative for cough, shortness of breath and wheezing  Cardiovascular: Negative for chest pain and palpitations  Gastrointestinal: Negative for abdominal pain, blood in stool, constipation, diarrhea, nausea and vomiting  Genitourinary: Negative for dysuria  Musculoskeletal: Positive for arthralgias  Negative for myalgias  Skin: Negative for rash          Hair loss   Neurological: Negative for dizziness, weakness and headaches  All other systems reviewed and are negative  Historical Information   Past Medical History:   Diagnosis Date    Abnormal blood chemistry     last assessed [id number]    Allergic 1990    Anemia 1998    Anxiety 2013    Arthritis     Cervical radiculopathy     last assessed [id number]    Depression 2018    It comes and goes  I think its related to my thyroid      Disease of thyroid gland     GERD (gastroesophageal reflux disease) 2017    Heart murmur 2005    HL (hearing loss) 1981    Leukopenia     Nonrheumatic mitral valve insufficiency 06/2013    Otitis media 1990    Pneumonia     Psychogenic nonepileptic seizure     Seizures ([address] ) 1984    Stopped when it turned 12    Viral gastroenteritis     Resolving, [place] diet, Stay hydrated;  last assessed [id number]    Vitamin B 12 deficiency     Vitamin D deficiency      Past Surgical History:   Procedure Laterality Date    HYSTERECTOMY      TONSILLECTOMY      TYMPANOSTOMY TUBE PLACEMENT Bilateral     WISDOM TOOTH EXTRACTION       Family History   Problem Relation Age of Onset    Other Father         exposed to chemical contaminants-dioxin    Heart attack Father     Arthritis Father     Asthma Father     Cancer Father         Skin cancer on his nose    COPD Father         Agent orange    Hearing loss Father     Alzheimer's disease Family     Arthritis Family     Bone cancer Family     Breast cancer Family     Heart disease Family     Hypertension Family     Stroke Family     Thyroid disease Family     Cancer Family     Skin cancer Family     Arthritis Mother     Anxiety disorder Mother     Osteoporosis Mother     Hypertension Mother     Thyroid disease Mother     Breast cancer Maternal Grandmother 39    Dementia Maternal Grandmother     Anxiety disorder Maternal Grandmother     Cancer Maternal Grandfather     Lung cancer Maternal Grandfather     Heart attack Paternal Grandmother     Heart failure Paternal Grandfather     Stroke Paternal Grandfather     No Known Problems Son     No Known Problems Son     Asthma Son    Ollie Sanford Learning disabilities Son         I have it to      Diabetes Neg Hx     Colon cancer Neg Hx      Social History     Socioeconomic History    Marital status: /Civil Union     Spouse name: Not on file    Number of children: 2    Years of education: 15    Highest education level: Not on file   Occupational History    Not on file   Social Needs    Financial resource strain: Not on file    Food insecurity:     Worry: Not on file     Inability: Not on file   [employer] needs:     Medical: Not on file     Non-medical: Not on file   Tobacco Use    Smoking status: Never Smoker    Smokeless tobacco: Never Used   Substance and Sexual Activity    Alcohol use: No     Binge frequency: Never    Drug use: No    Sexual activity: Not Currently     Partners: Male     Birth control/protection: None   Lifestyle    Physical activity:     Days per week: 4 days     Minutes per session: 30 min    Stress: Not at all   Relationships    Social connections:     Talks on phone: Not on file     Gets together: Not on file     Attends Hindu service: Not on file     Active member of club or organization: Not on file     Attends meetings of clubs or organizations: Not on file     Relationship status:     Intimate partner violence:     Fear of current or ex partner: Not on file     Emotionally abused: Not on file     Physically abused: Not on file     Forced sexual activity: Not on file   Other Topics Concern    Not on file   Social History Narrative    Denied caffeine use     OB History        3    Para   2    Term   2       0    AB   1    Living   2       SAB   1    TAB   0    Ectopic   0    Multiple   0    Live Births   2               Medications:      Current Outpatient Medications:     Bioflavonoid Products (IDANIA C PO), Take 1 tablet by mouth daily, Disp: , Rfl:     cetirizine (ZyrTEC) 10 mg tablet, Take 10 mg by mouth daily, Disp: , Rfl:     Cholecalciferol (VITAMIN D) 2000 units tablet, Take by mouth, Disp: , Rfl:     levothyroxine 25 mcg tablet, TAKE ONE TABLET BY MOUTH EVERY DAY, Disp: 30 tablet, Rfl: 11    Multiple Vitamin (MULTIVITAMIN) tablet, Take 1 tablet by mouth daily  , Disp: , Rfl:     Allergies   Allergen Reactions    Cortisone Rash and GI Intolerance    Ferrous Gluconate      Other reaction(s): Constipation    Ibuprofen Vomiting and Abdominal Pain     Advil    Other      seasonal    Montelukast Anxiety    Penicillins Rash     There is no immunization history for the selected administration types on file for this patient  OBJECTIVE  Vitals:   Vitals:    03/03/20 1350   BP: 120/82   BP Location: Right arm   Patient Position: Sitting   Cuff Size: Standard   Pulse: 72   Temp: 98 8 °F (37 1 °C)   Weight: 83 7 kg (184 lb 9 6 oz)   Height: 5' 4 5" (1 638 m)     Wt Readings from Last 3 Encounters:   03/03/20 83 7 kg (184 lb 9 6 oz)   12/19/19 80 7 kg (178 lb)   11/12/19 82 6 kg (182 lb)     Body mass index is 31 2 kg/m²  BP Readings from Last 3 Encounters:   03/03/20 120/82   12/19/19 118/82   11/12/19 110/60     Patient's last menstrual period was 01/18/2014 (exact date)  Physical Exam   Constitutional: She is oriented to person, place, and time  She appears well-developed and well-nourished  She does not appear ill  No distress  HENT:   Head: Normocephalic and atraumatic  Right Ear: Tympanic membrane, external ear and ear canal normal    Left Ear: Tympanic membrane, external ear and ear canal normal    Nose: Nose normal    Mouth/Throat: Uvula is midline, oropharynx is clear and moist and mucous membranes are normal  No oropharyngeal exudate  No tonsillar exudate  Eyes: Conjunctivae and EOM are normal    Neck: No thyromegaly present  Cardiovascular: Normal rate, regular rhythm, normal heart sounds and intact distal pulses  No murmur heard  Pulmonary/Chest: Effort normal and breath sounds normal  No respiratory distress  She has no decreased breath sounds  She has no wheezes  She has no rhonchi  She has no rales  Abdominal: Soft  Bowel sounds are normal  She exhibits no distension  There is no tenderness  Musculoskeletal: She exhibits no edema  Lymphadenopathy:     She has no cervical adenopathy  Neurological: She is alert and oriented to person, place, and time  She displays normal reflexes  No sensory deficit  She exhibits normal muscle tone  Skin: Skin is warm and dry  Capillary refill takes less than 2 seconds  Psychiatric: She has a normal mood and affect  Nursing note and vitals reviewed  Lab:  I have personally reviewed all pertinent results  Imaging:  I have personally reviewed all pertinent results

## 2020-03-13 ENCOUNTER — PATIENT MESSAGE (OUTPATIENT)
Dept: FAMILY MEDICINE CLINIC | Facility: CLINIC | Age: 40
End: 2020-03-13

## 2020-03-13 DIAGNOSIS — E03.8 HYPOTHYROIDISM DUE TO HASHIMOTO'S THYROIDITIS: Primary | ICD-10-CM

## 2020-03-13 DIAGNOSIS — E06.3 HYPOTHYROIDISM DUE TO HASHIMOTO'S THYROIDITIS: Primary | ICD-10-CM

## 2020-03-13 RX ORDER — LEVOTHYROXINE SODIUM 0.05 MG/1
50 TABLET ORAL DAILY
Qty: 90 TABLET | Refills: 1 | Status: SHIPPED | OUTPATIENT
Start: 2020-03-13 | End: 2020-06-05 | Stop reason: SDUPTHER

## 2020-03-17 ENCOUNTER — PATIENT MESSAGE (OUTPATIENT)
Dept: FAMILY MEDICINE CLINIC | Facility: CLINIC | Age: 40
End: 2020-03-17

## 2020-03-17 DIAGNOSIS — J01.00 ACUTE NON-RECURRENT MAXILLARY SINUSITIS: Primary | ICD-10-CM

## 2020-03-17 RX ORDER — DOXYCYCLINE HYCLATE 100 MG/1
100 CAPSULE ORAL EVERY 12 HOURS SCHEDULED
Qty: 14 CAPSULE | Refills: 0 | Status: SHIPPED | OUTPATIENT
Start: 2020-03-17 | End: 2020-03-24

## 2020-03-17 NOTE — TELEPHONE ENCOUNTER
From: Alfredito Segundo  To: Eugene Collins MD  Sent: 3/17/2020 1:48 PM EDT  Subject: Non-Urgent Medical Question    Dr Collins,    I was wondering if you could call me in a antibiotic for a sinus infection? With everything going on I really don't want to come in if I don't have to  I get sinus infection alot during the winter  I have NO fever, cough or sore throat  But my right side of my sinuses are puffy under my eye and I'm blowing out yellow stuff(gross I know, sorry)  It's my fault  I opened the windows the other day in the house and dusted  With my allergies i should not have done that  I can't take z-pack or penicillin  Please let me know    Thank you   Andry Arias

## 2020-04-30 ENCOUNTER — TELEMEDICINE (OUTPATIENT)
Dept: CARDIOLOGY CLINIC | Facility: CLINIC | Age: 40
End: 2020-04-30
Payer: COMMERCIAL

## 2020-04-30 DIAGNOSIS — I34.0 NONRHEUMATIC MITRAL VALVE REGURGITATION: Primary | ICD-10-CM

## 2020-04-30 DIAGNOSIS — Z82.49 FAMILY HISTORY OF EARLY CAD: ICD-10-CM

## 2020-04-30 PROCEDURE — 99443 PR PHYS/QHP TELEPHONE EVALUATION 21-30 MIN: CPT | Performed by: INTERNAL MEDICINE

## 2020-05-01 PROBLEM — Z82.49 FAMILY HISTORY OF EARLY CAD: Status: ACTIVE | Noted: 2020-05-01

## 2020-05-11 ENCOUNTER — PATIENT MESSAGE (OUTPATIENT)
Dept: FAMILY MEDICINE CLINIC | Facility: CLINIC | Age: 40
End: 2020-05-11

## 2020-05-11 ENCOUNTER — TELEMEDICINE (OUTPATIENT)
Dept: FAMILY MEDICINE CLINIC | Facility: CLINIC | Age: 40
End: 2020-05-11
Payer: COMMERCIAL

## 2020-05-11 DIAGNOSIS — H66.002 NON-RECURRENT ACUTE SUPPURATIVE OTITIS MEDIA OF LEFT EAR WITHOUT SPONTANEOUS RUPTURE OF TYMPANIC MEMBRANE: Primary | ICD-10-CM

## 2020-05-11 PROCEDURE — 99213 OFFICE O/P EST LOW 20 MIN: CPT | Performed by: FAMILY MEDICINE

## 2020-05-11 RX ORDER — AZITHROMYCIN 250 MG/1
TABLET, FILM COATED ORAL
Qty: 8 TABLET | Refills: 0 | Status: SHIPPED | OUTPATIENT
Start: 2020-05-11 | End: 2020-05-18

## 2020-06-05 ENCOUNTER — OFFICE VISIT (OUTPATIENT)
Dept: FAMILY MEDICINE CLINIC | Facility: CLINIC | Age: 40
End: 2020-06-05
Payer: COMMERCIAL

## 2020-06-05 ENCOUNTER — TELEPHONE (OUTPATIENT)
Dept: ADMINISTRATIVE | Facility: OTHER | Age: 40
End: 2020-06-05

## 2020-06-05 VITALS
HEIGHT: 65 IN | TEMPERATURE: 98.1 F | HEART RATE: 74 BPM | BODY MASS INDEX: 30.99 KG/M2 | SYSTOLIC BLOOD PRESSURE: 122 MMHG | DIASTOLIC BLOOD PRESSURE: 80 MMHG | WEIGHT: 186 LBS

## 2020-06-05 DIAGNOSIS — Z00.00 ANNUAL PHYSICAL EXAM: Primary | ICD-10-CM

## 2020-06-05 DIAGNOSIS — E06.3 HYPOTHYROIDISM DUE TO HASHIMOTO'S THYROIDITIS: ICD-10-CM

## 2020-06-05 DIAGNOSIS — E03.8 HYPOTHYROIDISM DUE TO HASHIMOTO'S THYROIDITIS: ICD-10-CM

## 2020-06-05 PROCEDURE — 99396 PREV VISIT EST AGE 40-64: CPT | Performed by: FAMILY MEDICINE

## 2020-06-05 RX ORDER — LEVOTHYROXINE SODIUM 0.05 MG/1
50 TABLET ORAL DAILY
Qty: 90 TABLET | Refills: 3 | Status: SHIPPED | OUTPATIENT
Start: 2020-06-05 | End: 2021-04-19

## 2020-06-23 ENCOUNTER — HOSPITAL ENCOUNTER (OUTPATIENT)
Dept: NON INVASIVE DIAGNOSTICS | Facility: CLINIC | Age: 40
Discharge: HOME | End: 2020-06-23
Payer: COMMERCIAL

## 2020-06-23 DIAGNOSIS — I34.0 NONRHEUMATIC MITRAL VALVE REGURGITATION: ICD-10-CM

## 2020-06-23 PROCEDURE — 93306 TTE W/DOPPLER COMPLETE: CPT

## 2020-06-23 PROCEDURE — 93306 TTE W/DOPPLER COMPLETE: CPT | Performed by: INTERNAL MEDICINE

## 2020-06-24 ENCOUNTER — TELEPHONE (OUTPATIENT)
Dept: CARDIOLOGY CLINIC | Facility: CLINIC | Age: 40
End: 2020-06-24

## 2020-07-16 ENCOUNTER — TELEPHONE (OUTPATIENT)
Dept: HEMATOLOGY ONCOLOGY | Facility: CLINIC | Age: 40
End: 2020-07-16

## 2020-07-16 NOTE — TELEPHONE ENCOUNTER
M for William Flaherty stating her appointment with Dr Hayder Claros on 11/20/20 was moved up from 2:40 to 2:20 PM

## 2020-07-30 PROBLEM — H72.92 PERFORATION OF LEFT TYMPANIC MEMBRANE: Status: ACTIVE | Noted: 2020-07-30

## 2020-07-31 ENCOUNTER — ANNUAL EXAM (OUTPATIENT)
Dept: OBGYN CLINIC | Facility: CLINIC | Age: 40
End: 2020-07-31
Payer: COMMERCIAL

## 2020-07-31 VITALS
BODY MASS INDEX: 27.55 KG/M2 | DIASTOLIC BLOOD PRESSURE: 80 MMHG | SYSTOLIC BLOOD PRESSURE: 122 MMHG | WEIGHT: 186 LBS | HEIGHT: 69 IN

## 2020-07-31 DIAGNOSIS — Z12.31 ENCOUNTER FOR SCREENING MAMMOGRAM FOR BREAST CANCER: ICD-10-CM

## 2020-07-31 DIAGNOSIS — Z12.39 ENCOUNTER FOR SCREENING BREAST EXAMINATION: ICD-10-CM

## 2020-07-31 DIAGNOSIS — Z01.419 ENCOUNTER FOR WELL WOMAN EXAM: Primary | ICD-10-CM

## 2020-07-31 PROCEDURE — 3008F BODY MASS INDEX DOCD: CPT | Performed by: FAMILY MEDICINE

## 2020-07-31 PROCEDURE — S0612 ANNUAL GYNECOLOGICAL EXAMINA: HCPCS | Performed by: PHYSICIAN ASSISTANT

## 2020-07-31 NOTE — PROGRESS NOTES
Assessment/Plan:    No problem-specific Assessment & Plan notes found for this encounter  Diagnoses and all orders for this visit:    Encounter for well woman exam    Encounter for screening breast examination    Encounter for screening mammogram for breast cancer  -     Mammo screening bilateral w cad; Future          Subjective:      Patient ID: Jorge Cramer is a 38 y o  female  Pt presents for her annual exam today--  She has no complaints  She has no bleeding or pelvic pain--hyster  Bowel and bladder are regular  No breast concerns today  Last mammo--11/29    No pap today  rx mammo  Daily mvi      The following portions of the patient's history were reviewed and updated as appropriate: allergies, current medications, past family history, past medical history, past social history, past surgical history and problem list     Review of Systems   Constitutional: Negative for chills, fever and unexpected weight change  Gastrointestinal: Negative for abdominal pain, blood in stool, constipation and diarrhea  Genitourinary: Negative  Objective:      /80 (BP Location: Left arm, Patient Position: Sitting, Cuff Size: Large)   Ht 5' 9 29" (1 76 m)   Wt 84 4 kg (186 lb)   LMP 01/18/2014 (Exact Date)   BMI 27 24 kg/m²          Physical Exam   Constitutional: She appears well-developed and well-nourished  HENT:   Head: Normocephalic and atraumatic  Neck: Normal range of motion  Pulmonary/Chest: Right breast exhibits no inverted nipple, no mass, no nipple discharge and no skin change  Left breast exhibits no inverted nipple, no mass, no nipple discharge and no skin change  Abdominal: Soft  Genitourinary: Vagina normal and uterus normal  Pelvic exam was performed with patient supine  There is no rash, tenderness or lesion on the right labia  There is no rash, tenderness or lesion on the left labia  Cervix exhibits no motion tenderness, no discharge and no friability   Right adnexum displays no mass, no tenderness and no fullness  Left adnexum displays no mass, no tenderness and no fullness  Lymphadenopathy: No inguinal adenopathy noted on the right or left side  Nursing note and vitals reviewed

## 2020-07-31 NOTE — PROGRESS NOTES
The patient is here for a yearly  She had a hysterectomy so she does not need a pap smear  No bleeding or cramping  No vaginal, bowel, bladder or breast problems

## 2020-08-04 ENCOUNTER — TELEPHONE (OUTPATIENT)
Dept: HEMATOLOGY ONCOLOGY | Facility: CLINIC | Age: 40
End: 2020-08-04

## 2020-08-04 ENCOUNTER — TELEPHONE (OUTPATIENT)
Dept: OTHER | Facility: HOSPITAL | Age: 40
End: 2020-08-04

## 2020-08-04 DIAGNOSIS — D72.819 LEUKOPENIA: Primary | ICD-10-CM

## 2020-08-04 NOTE — TELEPHONE ENCOUNTER
Based on labs from March 2020, WBC count was normal, therefore not immunocompromised  She may have a repeat CBC prior to school starting  Order is in the system  ----- Message from Kaylene Najjar, RN sent at 8/4/2020 11:05 AM EDT -----  Regarding: FW: Non-Urgent Medical Question  Contact: [phone]  Bright,    Patient last saw you on 11/12/2019 and you reviewed see back in 1 year with labs  Can you please see patient's email below and advise the patient? Thank you  Freddy  ----- Message -----  From: Chai Lujan RN  Sent: 8/4/2020   8:08 AM EDT  To: Farzaneh Rodriguez RN, Kaylene Najjar, RN, #  Subject: Shani Herrmann: Non-Urgent Medical Question                    ----- Message -----  From: Lori Figueroa  Sent: 8/3/2020   1:11 PM EDT  To: Hematology [facility]  Subject: Non-Urgent Medical Question                      Good afternoon Dr Graff,    I have a quick question, Do you think it is safe for me to go back to work? 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

## 2020-08-04 NOTE — TELEPHONE ENCOUNTER
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

## 2020-08-06 DIAGNOSIS — D64.9 ANEMIA, UNSPECIFIED TYPE: ICD-10-CM

## 2020-08-06 DIAGNOSIS — E61.1 IRON DEFICIENCY: Primary | ICD-10-CM

## 2020-10-07 ENCOUNTER — TELEPHONE (OUTPATIENT)
Dept: UROLOGY | Facility: AMBULATORY SURGERY CENTER | Age: 40
End: 2020-10-07

## 2020-10-07 DIAGNOSIS — N28.1 RENAL CYST: Primary | ICD-10-CM

## 2020-10-16 ENCOUNTER — TELEMEDICINE (OUTPATIENT)
Dept: FAMILY MEDICINE CLINIC | Facility: CLINIC | Age: 40
End: 2020-10-16
Payer: COMMERCIAL

## 2020-10-16 DIAGNOSIS — J01.00 ACUTE NON-RECURRENT MAXILLARY SINUSITIS: Primary | ICD-10-CM

## 2020-10-16 PROCEDURE — 99213 OFFICE O/P EST LOW 20 MIN: CPT | Performed by: FAMILY MEDICINE

## 2020-10-16 RX ORDER — DOXYCYCLINE HYCLATE 100 MG/1
100 CAPSULE ORAL EVERY 12 HOURS SCHEDULED
Qty: 14 CAPSULE | Refills: 0 | Status: SHIPPED | OUTPATIENT
Start: 2020-10-16 | End: 2020-10-23

## 2020-11-03 ENCOUNTER — TELEPHONE (OUTPATIENT)
Dept: FAMILY MEDICINE CLINIC | Facility: CLINIC | Age: 40
End: 2020-11-03

## 2020-11-03 DIAGNOSIS — I34.0 NON-RHEUMATIC MITRAL REGURGITATION: ICD-10-CM

## 2020-11-03 DIAGNOSIS — E03.8 HYPOTHYROIDISM DUE TO HASHIMOTO'S THYROIDITIS: Primary | ICD-10-CM

## 2020-11-03 DIAGNOSIS — E06.3 HYPOTHYROIDISM DUE TO HASHIMOTO'S THYROIDITIS: Primary | ICD-10-CM

## 2020-11-16 ENCOUNTER — HOSPITAL ENCOUNTER (OUTPATIENT)
Dept: RADIOLOGY | Age: 40
Discharge: HOME | End: 2020-11-16
Payer: COMMERCIAL

## 2020-11-16 DIAGNOSIS — N28.1 RENAL CYST: ICD-10-CM

## 2020-11-16 PROCEDURE — 76770 US EXAM ABDO BACK WALL COMP: CPT

## 2020-11-20 ENCOUNTER — OFFICE VISIT (OUTPATIENT)
Dept: HEMATOLOGY ONCOLOGY | Facility: CLINIC | Age: 40
End: 2020-11-20
Payer: COMMERCIAL

## 2020-11-20 VITALS
HEART RATE: 68 BPM | OXYGEN SATURATION: 98 % | BODY MASS INDEX: 27.4 KG/M2 | SYSTOLIC BLOOD PRESSURE: 118 MMHG | DIASTOLIC BLOOD PRESSURE: 78 MMHG | HEIGHT: 69 IN | RESPIRATION RATE: 16 BRPM | WEIGHT: 185 LBS | TEMPERATURE: 99.1 F

## 2020-11-20 DIAGNOSIS — E61.1 IRON DEFICIENCY: ICD-10-CM

## 2020-11-20 DIAGNOSIS — D72.819 LEUKOPENIA, UNSPECIFIED TYPE: Primary | ICD-10-CM

## 2020-11-20 PROCEDURE — 3008F BODY MASS INDEX DOCD: CPT | Performed by: INTERNAL MEDICINE

## 2020-11-20 PROCEDURE — 99213 OFFICE O/P EST LOW 20 MIN: CPT | Performed by: INTERNAL MEDICINE

## 2020-11-20 PROCEDURE — 1036F TOBACCO NON-USER: CPT | Performed by: INTERNAL MEDICINE

## 2021-01-23 ENCOUNTER — PATIENT MESSAGE (OUTPATIENT)
Dept: FAMILY MEDICINE CLINIC | Facility: CLINIC | Age: 41
End: 2021-01-23

## 2021-01-23 DIAGNOSIS — J01.00 ACUTE NON-RECURRENT MAXILLARY SINUSITIS: Primary | ICD-10-CM

## 2021-01-24 ENCOUNTER — IMMUNIZATIONS (OUTPATIENT)
Dept: FAMILY MEDICINE CLINIC | Facility: HOSPITAL | Age: 41
End: 2021-01-24

## 2021-01-24 DIAGNOSIS — Z23 ENCOUNTER FOR IMMUNIZATION: Primary | ICD-10-CM

## 2021-01-24 PROCEDURE — 0011A SARS-COV-2 / COVID-19 MRNA VACCINE (MODERNA) 100 MCG: CPT

## 2021-01-24 PROCEDURE — 91301 SARS-COV-2 / COVID-19 MRNA VACCINE (MODERNA) 100 MCG: CPT

## 2021-01-25 RX ORDER — DOXYCYCLINE HYCLATE 100 MG/1
100 CAPSULE ORAL EVERY 12 HOURS SCHEDULED
Qty: 14 CAPSULE | Refills: 0 | Status: SHIPPED | OUTPATIENT
Start: 2021-01-25 | End: 2021-02-01

## 2021-02-19 ENCOUNTER — IMMUNIZATIONS (OUTPATIENT)
Dept: FAMILY MEDICINE CLINIC | Facility: HOSPITAL | Age: 41
End: 2021-02-19

## 2021-02-19 DIAGNOSIS — Z23 ENCOUNTER FOR IMMUNIZATION: Primary | ICD-10-CM

## 2021-02-19 PROCEDURE — 0012A SARS-COV-2 / COVID-19 MRNA VACCINE (MODERNA) 100 MCG: CPT

## 2021-02-19 PROCEDURE — 91301 SARS-COV-2 / COVID-19 MRNA VACCINE (MODERNA) 100 MCG: CPT

## 2021-04-06 ENCOUNTER — PATIENT MESSAGE (OUTPATIENT)
Dept: FAMILY MEDICINE CLINIC | Facility: CLINIC | Age: 41
End: 2021-04-06

## 2021-04-06 DIAGNOSIS — E03.8 HYPOTHYROIDISM DUE TO HASHIMOTO'S THYROIDITIS: Primary | ICD-10-CM

## 2021-04-06 DIAGNOSIS — E06.3 HYPOTHYROIDISM DUE TO HASHIMOTO'S THYROIDITIS: Primary | ICD-10-CM

## 2021-04-18 ENCOUNTER — PATIENT MESSAGE (OUTPATIENT)
Dept: FAMILY MEDICINE CLINIC | Facility: CLINIC | Age: 41
End: 2021-04-18

## 2021-04-18 DIAGNOSIS — E06.3 HYPOTHYROIDISM DUE TO HASHIMOTO'S THYROIDITIS: Primary | ICD-10-CM

## 2021-04-18 DIAGNOSIS — E03.8 HYPOTHYROIDISM DUE TO HASHIMOTO'S THYROIDITIS: Primary | ICD-10-CM

## 2021-04-19 RX ORDER — LEVOTHYROXINE SODIUM 0.07 MG/1
75 TABLET ORAL
Qty: 30 TABLET | Refills: 2 | Status: SHIPPED | OUTPATIENT
Start: 2021-04-19 | End: 2021-07-16 | Stop reason: SDUPTHER

## 2021-05-19 ENCOUNTER — HOSPITAL ENCOUNTER (OUTPATIENT)
Dept: RADIOLOGY | Facility: MEDICAL CENTER | Age: 41
Discharge: HOME | End: 2021-05-19
Payer: COMMERCIAL

## 2021-05-19 VITALS — WEIGHT: 185 LBS | BODY MASS INDEX: 27.4 KG/M2 | HEIGHT: 69 IN

## 2021-05-19 DIAGNOSIS — Z12.31 ENCOUNTER FOR SCREENING MAMMOGRAM FOR BREAST CANCER: ICD-10-CM

## 2021-05-19 PROCEDURE — 77063 BREAST TOMOSYNTHESIS BI: CPT

## 2021-05-19 PROCEDURE — 77067 SCR MAMMO BI INCL CAD: CPT

## 2021-06-08 ENCOUNTER — OFFICE VISIT (OUTPATIENT)
Dept: FAMILY MEDICINE CLINIC | Facility: CLINIC | Age: 41
End: 2021-06-08
Payer: COMMERCIAL

## 2021-06-08 VITALS
DIASTOLIC BLOOD PRESSURE: 86 MMHG | TEMPERATURE: 97.9 F | SYSTOLIC BLOOD PRESSURE: 116 MMHG | BODY MASS INDEX: 32.06 KG/M2 | HEIGHT: 64 IN | WEIGHT: 187.8 LBS | HEART RATE: 82 BPM | OXYGEN SATURATION: 98 %

## 2021-06-08 DIAGNOSIS — Z00.00 ANNUAL PHYSICAL EXAM: Primary | ICD-10-CM

## 2021-06-08 DIAGNOSIS — E03.8 HYPOTHYROIDISM DUE TO HASHIMOTO'S THYROIDITIS: ICD-10-CM

## 2021-06-08 DIAGNOSIS — E06.3 HYPOTHYROIDISM DUE TO HASHIMOTO'S THYROIDITIS: ICD-10-CM

## 2021-06-08 PROCEDURE — 99396 PREV VISIT EST AGE 40-64: CPT | Performed by: FAMILY MEDICINE

## 2021-06-08 PROCEDURE — 3725F SCREEN DEPRESSION PERFORMED: CPT | Performed by: FAMILY MEDICINE

## 2021-06-08 PROCEDURE — 3008F BODY MASS INDEX DOCD: CPT | Performed by: FAMILY MEDICINE

## 2021-06-08 PROCEDURE — 1036F TOBACCO NON-USER: CPT | Performed by: FAMILY MEDICINE

## 2021-06-08 NOTE — PATIENT INSTRUCTIONS

## 2021-06-08 NOTE — PROGRESS NOTES
ADULT ANNUAL [address]    NAME: Alejandro Graham  AGE: 42 y o  SEX: female  : 1980     DATE: 6/10/2021     Assessment and Plan:     Problem List Items Addressed This Visit        Endocrine    Hypothyroidism    Relevant Orders    TSH, 3rd generation with Free T4 reflex      Other Visit Diagnoses     Annual physical exam    -  Primary    Relevant Orders    TSH, 3rd generation with Free T4 reflex    Lipid Panel with Direct LDL reflex    CBC    Comprehensive metabolic panel    BMI 76 7-94 0,TPKWL              Immunizations and preventive care screenings were discussed with patient today  Appropriate education was printed on patient's after visit summary  Counseling:  Alcohol/drug use: discussed moderation in alcohol intake, the recommendations for healthy alcohol use, and avoidance of illicit drug use  Dental Health: discussed importance of regular tooth brushing, flossing, and dental visits  Injury prevention: discussed safety/seat belts, safety helmets, smoke detectors, carbon dioxide detectors, and smoking near bedding or upholstery  · Exercise: the importance of regular exercise/physical activity was discussed  Recommend exercise 3-5 times per week for at least 30 minutes  BMI Counseling: Body mass index is 32 24 kg/m²  The BMI is above normal  Nutrition recommendations include decreasing portion sizes, encouraging healthy choices of fruits and vegetables and limiting drinks that contain sugar  Exercise recommendations include moderate physical activity 150 minutes/week, exercising 3-5 times per week and strength training exercises  Patient referred to PCP due to patient being overweight             Return in about 1 year (around 2022) for Annual physical      Chief Complaint:     Chief Complaint   Patient presents with    Annual Exam    Thyroid Problem      History of Present Illness:     Adult Annual Physical   Patient here for a comprehensive physical exam  The patient reports no problems  Diet and Physical Activity  · Diet/Nutrition: well balanced diet  · Exercise: walking  Depression Screening  PHQ-9 Depression Screening    PHQ-9:   Frequency of the following problems over the past two weeks:      Little interest or pleasure in doing things: 0 - not at all  Feeling down, depressed, or hopeless: 1 - several days  PHQ-2 Score: 1       General Health  · Sleep: sleeps well  · Hearing: normal - bilateral   · Vision: no vision problems  · Dental: regular dental visits  /GYN Health  · Patient is: premenopausal  · Last menstrual period: Patient's last menstrual period was 01/18/2014 (exact date)  Review of Systems:     Review of Systems   Constitutional: Negative for activity change, chills and fever  HENT: Negative for congestion, rhinorrhea and sore throat  Eyes: Negative for visual disturbance  Respiratory: Negative for cough, shortness of breath and wheezing  Cardiovascular: Negative for chest pain and palpitations  Gastrointestinal: Negative for abdominal pain, blood in stool, constipation, diarrhea, nausea and vomiting  Genitourinary: Negative for dysuria  Musculoskeletal: Negative for arthralgias and myalgias  Skin: Negative for rash  Neurological: Negative for dizziness, weakness and headaches  All other systems reviewed and are negative  Past Medical History:     Past Medical History:   Diagnosis Date    Abnormal blood chemistry     last assessed [id number]    Allergic 1990    Anemia 1998    Anxiety 2013    Arthritis     Cervical radiculopathy     last assessed [id number]    Depression 2018    It comes and goes  I think its related to my thyroid      Disease of thyroid gland     GERD (gastroesophageal reflux disease) 2017    Heart murmur 2005    HL (hearing loss) 1981    Leukopenia     Nonrheumatic mitral valve insufficiency 06/2013    Otitis media 1990    Pneumonia     Psychogenic nonepileptic seizure     Seizures ([facility] [address] ) 1984    Stopped when it turned 12    Viral gastroenteritis     Resolving, [place] diet, Stay hydrated;  last assessed [id number]    Vitamin B 12 deficiency     Vitamin D deficiency       Past Surgical History:     Past Surgical History:   Procedure Laterality Date    HYSTERECTOMY      TONSILLECTOMY      TYMPANOSTOMY TUBE PLACEMENT Bilateral     WISDOM TOOTH EXTRACTION        Social History:     E-Cigarette/Vaping    E-Cigarette Use Never User      E-Cigarette/Vaping Substances    Nicotine No     THC No     CBD No     Flavoring No     Other No     Unknown No      Social History     Socioeconomic History    Marital status: /Civil Union     Spouse name: None    Number of children: 2    Years of education: 15    Highest education level: None   Occupational History    None   Social Needs    Financial resource strain: None    Food insecurity     Worry: None     Inability: None    Transportation needs     Medical: None     Non-medical: None   Tobacco Use    Smoking status: Never Smoker    Smokeless tobacco: Never Used   Substance and Sexual Activity    Alcohol use: No     Binge frequency: Never    Drug use: No    Sexual activity: Not Currently     Partners: Male     Birth control/protection: None   Lifestyle    Physical activity     Days per week: 4 days     Minutes per session: 30 min    Stress: Not at all   Relationships    Social connections     Talks on phone: None     Gets together: None     Attends [employer] service: None     Active member of club or organization: None     Attends meetings of clubs or organizations: None     Relationship status:     Intimate partner violence     Fear of current or ex partner: None     Emotionally abused: None     Physically abused: None     Forced sexual activity: None   Other Topics Concern    None   Social History Narrative    Denied caffeine use      Family History: Family History   Problem Relation Age of Onset    Other Father         exposed to chemical contaminants-dioxin    Heart attack Father     Arthritis Father     Asthma Father     Cancer Father         Skin cancer on his nose    COPD Father         Agent orange    Hearing loss Father     Alzheimer's disease Family     Arthritis Family     Bone cancer Family     Breast cancer Family     Heart disease Family     Hypertension Family     Stroke Family     Thyroid disease Family     Cancer Family     Skin cancer Family     Arthritis Mother     Anxiety disorder Mother     Osteoporosis Mother     Hypertension Mother     Thyroid disease Mother     Breast cancer Maternal Grandmother 39    Dementia Maternal Grandmother     Anxiety disorder Maternal Grandmother     Cancer Maternal Grandfather     Lung cancer Maternal Grandfather     Heart attack Paternal Grandmother     Heart failure Paternal Grandfather     Stroke Paternal Grandfather     No Known Problems Son     Asthma Son    [facility] Learning disabilities Son         I have it to   Diabetes Neg Hx     Colon cancer Neg Hx       Current Medications:     Current Outpatient Medications   Medication Sig Dispense Refill    Bioflavonoid Products (IDANIA C PO) Take 1 tablet by mouth daily      cetirizine (ZyrTEC) 10 mg tablet Take 10 mg by mouth daily      Cholecalciferol (VITAMIN D) 2000 units tablet Take by mouth      levothyroxine 75 mcg tablet Take 1 tablet (75 mcg total) by mouth daily in the early morning 30 tablet 2    Multiple Vitamin (MULTIVITAMIN) tablet Take 1 tablet by mouth daily  No current facility-administered medications for this visit  Allergies:      Allergies   Allergen Reactions    Cortisone Rash and GI Intolerance    Ferrous Gluconate      Other reaction(s): Constipation    Ibuprofen Vomiting and Abdominal Pain     Advil    Other      seasonal    Montelukast Anxiety    Penicillins Rash      Physical Exam: /86   Pulse 82   Temp 97 9 °F (36 6 °C)   Ht 5' 4" (1 626 m)   Wt 85 2 kg (187 lb 12 8 oz)   LMP 01/18/2014 (Exact Date)   SpO2 98%   BMI 32 24 kg/m²       BMI Counseling: Body mass index is 32 24 kg/m²  The BMI is above normal  Nutrition recommendations include 3-5 servings of fruits/vegetables daily  Physical Exam  Vitals signs and nursing note reviewed  Constitutional:       General: She is not in acute distress  Appearance: She is well-developed  She is not ill-appearing  HENT:      Head: Normocephalic and atraumatic  Right Ear: Tympanic membrane, ear canal and external ear normal  No middle ear effusion  Left Ear: Tympanic membrane, ear canal and external ear normal   No middle ear effusion  Nose: Nose normal  No congestion or rhinorrhea  Mouth/Throat:      Lips: Pink  Mouth: Mucous membranes are moist       Pharynx: Oropharynx is clear  Uvula midline  No oropharyngeal exudate  Tonsils: No tonsillar exudate  Eyes:      General: Lids are normal       Extraocular Movements: Extraocular movements intact  Conjunctiva/sclera: Conjunctivae normal       Pupils: Pupils are equal, round, and reactive to light  Neck:      Thyroid: No thyromegaly  Trachea: No tracheal deviation  Cardiovascular:      Rate and Rhythm: Normal rate and regular rhythm  Pulses: Normal pulses  Heart sounds: Normal heart sounds, S1 normal and S2 normal  No murmur  Pulmonary:      Effort: Pulmonary effort is normal  No respiratory distress  Breath sounds: Normal breath sounds  No decreased breath sounds, wheezing, rhonchi or rales  Abdominal:      General: Bowel sounds are normal  There is no distension  Palpations: Abdomen is soft  Tenderness: There is no abdominal tenderness  Musculoskeletal:      Right lower leg: No edema  Left lower leg: No edema  Lymphadenopathy:      Cervical: No cervical adenopathy     Skin:     General: Skin is warm and dry  Capillary Refill: Capillary refill takes less than 2 seconds  Neurological:      Mental Status: She is alert and oriented to person, place, and time  Cranial Nerves: Cranial nerves are intact  Deep Tendon Reflexes: Reflexes normal       Reflex Scores:       Patellar reflexes are 2+ on the right side and 2+ on the left side  Psychiatric:         Attention and Perception: Attention normal          Mood and Affect: Mood normal          Thought Content: Thought content does not include suicidal ideation            MD Colton Maclolm

## 2021-07-16 DIAGNOSIS — E03.8 HYPOTHYROIDISM DUE TO HASHIMOTO'S THYROIDITIS: ICD-10-CM

## 2021-07-16 DIAGNOSIS — E06.3 HYPOTHYROIDISM DUE TO HASHIMOTO'S THYROIDITIS: ICD-10-CM

## 2021-07-16 RX ORDER — LEVOTHYROXINE SODIUM 0.07 MG/1
75 TABLET ORAL
Qty: 90 TABLET | Refills: 1 | Status: SHIPPED | OUTPATIENT
Start: 2021-07-16 | End: 2021-12-29 | Stop reason: SDUPTHER

## 2021-08-02 ENCOUNTER — ANNUAL EXAM (OUTPATIENT)
Dept: OBGYN CLINIC | Facility: CLINIC | Age: 41
End: 2021-08-02
Payer: COMMERCIAL

## 2021-08-02 VITALS
WEIGHT: 187 LBS | SYSTOLIC BLOOD PRESSURE: 118 MMHG | DIASTOLIC BLOOD PRESSURE: 78 MMHG | HEIGHT: 64 IN | BODY MASS INDEX: 31.92 KG/M2

## 2021-08-02 DIAGNOSIS — Z12.39 ENCOUNTER FOR SCREENING BREAST EXAMINATION: ICD-10-CM

## 2021-08-02 DIAGNOSIS — Z12.31 ENCOUNTER FOR SCREENING MAMMOGRAM FOR BREAST CANCER: ICD-10-CM

## 2021-08-02 DIAGNOSIS — Z01.419 ENCOUNTER FOR WELL WOMAN EXAM: Primary | ICD-10-CM

## 2021-08-02 PROCEDURE — S0612 ANNUAL GYNECOLOGICAL EXAMINA: HCPCS | Performed by: PHYSICIAN ASSISTANT

## 2021-08-02 PROCEDURE — 3008F BODY MASS INDEX DOCD: CPT | Performed by: FAMILY MEDICINE

## 2021-08-02 NOTE — PROGRESS NOTES
Patient is here for yearly exam   Patient has no bleeding or pelvic pain  No breast concerns and B&B ok  Patient is experiencing some weight gain but just had her medication for thyroid changed  Patient is not due for a pap smear at this visit  HYSTER    05/19/2021 Normal Mammo

## 2021-08-02 NOTE — PROGRESS NOTES
Assessment/Plan:    No problem-specific Assessment & Plan notes found for this encounter  Diagnoses and all orders for this visit:    Encounter for well woman exam    Encounter for screening breast examination    Encounter for screening mammogram for breast cancer  -     Mammo screening bilateral w 3d & cad; Future          Subjective:      Patient ID: Jordan Moore is a 42 y o  female  Pt presents for her annual exam today--  She has no complaints except recent weight gain, just had her thyroid meds adjusted  She has no bleeding or pelvic pain--hyster  Bowel and bladder are regular  No breast concerns today  Last mammo--2019    No pap today  rx mammo  Daily mvi      The following portions of the patient's history were reviewed and updated as appropriate: allergies, current medications, past family history, past medical history, past social history, past surgical history and problem list     Review of Systems   Constitutional: Negative for chills, fever and unexpected weight change  Gastrointestinal: Negative for abdominal pain, blood in stool, constipation and diarrhea  Genitourinary: Negative  Objective:      /78   Ht 5' 4" (1 626 m)   Wt 84 8 kg (187 lb)   LMP 01/18/2014 (Exact Date)   BMI 32 10 kg/m²          Physical Exam  Vitals and nursing note reviewed  Constitutional:       Appearance: She is well-developed  HENT:      Head: Normocephalic and atraumatic  Chest:      Breasts:         Right: No inverted nipple, mass, nipple discharge or skin change  Left: No inverted nipple, mass, nipple discharge or skin change  Abdominal:      Palpations: Abdomen is soft  Genitourinary:     Exam position: Supine  Labia:         Right: No rash, tenderness or lesion  Left: No rash, tenderness or lesion  Vagina: Normal       Cervix: No cervical motion tenderness, discharge or friability  Adnexa:         Right: No mass, tenderness or fullness  Left: No mass, tenderness or fullness  Musculoskeletal:      Cervical back: Normal range of motion  Lymphadenopathy:      Lower Body: No right inguinal adenopathy  No left inguinal adenopathy

## 2021-10-15 ENCOUNTER — TELEPHONE (OUTPATIENT)
Dept: UROLOGY | Facility: AMBULATORY SURGERY CENTER | Age: 41
End: 2021-10-15

## 2021-10-28 ENCOUNTER — TELEPHONE (OUTPATIENT)
Dept: HEMATOLOGY ONCOLOGY | Facility: HOSPITAL | Age: 41
End: 2021-10-28

## 2021-12-29 DIAGNOSIS — E06.3 HYPOTHYROIDISM DUE TO HASHIMOTO'S THYROIDITIS: ICD-10-CM

## 2021-12-29 DIAGNOSIS — E03.8 HYPOTHYROIDISM DUE TO HASHIMOTO'S THYROIDITIS: ICD-10-CM

## 2021-12-29 RX ORDER — LEVOTHYROXINE SODIUM 0.07 MG/1
75 TABLET ORAL
Qty: 90 TABLET | Refills: 0 | Status: SHIPPED | OUTPATIENT
Start: 2021-12-29 | End: 2022-04-12

## 2022-01-27 ENCOUNTER — PATIENT MESSAGE (OUTPATIENT)
Dept: FAMILY MEDICINE CLINIC | Facility: CLINIC | Age: 42
End: 2022-01-27

## 2022-01-27 DIAGNOSIS — E03.8 HYPOTHYROIDISM DUE TO HASHIMOTO'S THYROIDITIS: Primary | ICD-10-CM

## 2022-01-27 DIAGNOSIS — E06.3 HYPOTHYROIDISM DUE TO HASHIMOTO'S THYROIDITIS: Primary | ICD-10-CM

## 2022-02-06 ENCOUNTER — APPOINTMENT (OUTPATIENT)
Dept: LAB | Age: 42
End: 2022-02-06
Payer: COMMERCIAL

## 2022-02-06 DIAGNOSIS — E03.8 HYPOTHYROIDISM DUE TO HASHIMOTO'S THYROIDITIS: ICD-10-CM

## 2022-02-06 DIAGNOSIS — E06.3 HYPOTHYROIDISM DUE TO HASHIMOTO'S THYROIDITIS: ICD-10-CM

## 2022-02-06 DIAGNOSIS — E61.1 IRON DEFICIENCY: ICD-10-CM

## 2022-02-06 DIAGNOSIS — D72.819 LEUKOPENIA, UNSPECIFIED TYPE: ICD-10-CM

## 2022-02-06 DIAGNOSIS — D64.9 ANEMIA, UNSPECIFIED TYPE: ICD-10-CM

## 2022-02-06 LAB
ALBUMIN SERPL BCP-MCNC: 3.7 G/DL (ref 3.5–5)
ALP SERPL-CCNC: 72 U/L (ref 46–116)
ALT SERPL W P-5'-P-CCNC: 45 U/L (ref 12–78)
ANION GAP SERPL CALCULATED.3IONS-SCNC: 4 MMOL/L (ref 4–13)
AST SERPL W P-5'-P-CCNC: 22 U/L (ref 5–45)
BILIRUB SERPL-MCNC: 0.56 MG/DL (ref 0.2–1)
BUN SERPL-MCNC: 11 MG/DL (ref 5–25)
CALCIUM SERPL-MCNC: 9.1 MG/DL (ref 8.3–10.1)
CHLORIDE SERPL-SCNC: 109 MMOL/L (ref 100–108)
CO2 SERPL-SCNC: 26 MMOL/L (ref 21–32)
CREAT SERPL-MCNC: 0.77 MG/DL (ref 0.6–1.3)
FERRITIN SERPL-MCNC: 51 NG/ML (ref 8–388)
GFR SERPL CREATININE-BSD FRML MDRD: 95 ML/MIN/1.73SQ M
GLUCOSE P FAST SERPL-MCNC: 96 MG/DL (ref 65–99)
IRON SATN MFR SERPL: 34 % (ref 15–50)
IRON SERPL-MCNC: 99 UG/DL (ref 50–170)
LDH SERPL-CCNC: 197 U/L (ref 81–234)
POTASSIUM SERPL-SCNC: 3.8 MMOL/L (ref 3.5–5.3)
PROT SERPL-MCNC: 7.4 G/DL (ref 6.4–8.2)
SODIUM SERPL-SCNC: 139 MMOL/L (ref 136–145)
TIBC SERPL-MCNC: 293 UG/DL (ref 250–450)
TSH SERPL DL<=0.05 MIU/L-ACNC: 0.7 UIU/ML (ref 0.36–3.74)

## 2022-02-06 PROCEDURE — 83550 IRON BINDING TEST: CPT

## 2022-02-06 PROCEDURE — 83615 LACTATE (LD) (LDH) ENZYME: CPT

## 2022-02-06 PROCEDURE — 84443 ASSAY THYROID STIM HORMONE: CPT

## 2022-02-06 PROCEDURE — 82728 ASSAY OF FERRITIN: CPT

## 2022-02-06 PROCEDURE — 80053 COMPREHEN METABOLIC PANEL: CPT

## 2022-02-06 PROCEDURE — 83540 ASSAY OF IRON: CPT

## 2022-03-17 ENCOUNTER — OFFICE VISIT (OUTPATIENT)
Dept: HEMATOLOGY ONCOLOGY | Facility: CLINIC | Age: 42
End: 2022-03-17
Payer: COMMERCIAL

## 2022-03-17 VITALS
SYSTOLIC BLOOD PRESSURE: 126 MMHG | TEMPERATURE: 98.2 F | WEIGHT: 189 LBS | OXYGEN SATURATION: 99 % | HEIGHT: 64 IN | HEART RATE: 75 BPM | BODY MASS INDEX: 32.27 KG/M2 | RESPIRATION RATE: 16 BRPM | DIASTOLIC BLOOD PRESSURE: 76 MMHG

## 2022-03-17 DIAGNOSIS — D72.819 LEUKOPENIA, UNSPECIFIED TYPE: Primary | ICD-10-CM

## 2022-03-17 PROCEDURE — 3008F BODY MASS INDEX DOCD: CPT | Performed by: INTERNAL MEDICINE

## 2022-03-17 PROCEDURE — 1036F TOBACCO NON-USER: CPT | Performed by: INTERNAL MEDICINE

## 2022-03-17 PROCEDURE — 99213 OFFICE O/P EST LOW 20 MIN: CPT | Performed by: INTERNAL MEDICINE

## 2022-03-17 NOTE — PROGRESS NOTES
HPI:    Patient has history of intermittent mild leukopenia without frequent or severe infections and this problem appears to be of no clinical significance  Previous history of iron deficiency anemia that was secondary to chronic blood loss with menorrhagia  Patient had NEVILLE  No more menorrhagia and no more anemia  She has some tiredness and occasionally myalgia of secondary to fibromyalgia  She follows with her rheumatologist   No history of febrile neutropenia  Patient follows with her urologist for complex cyst in left kidney  History of hypothyroidism  Left foot is in a special boot and she follows with a specialist                   Current Outpatient Medications:     Bioflavonoid Products (IDANIA C PO), Take 1 tablet by mouth daily, Disp: , Rfl:     cetirizine (ZyrTEC) 10 mg tablet, Take 10 mg by mouth daily, Disp: , Rfl:     Cholecalciferol (VITAMIN D) 2000 units tablet, Take by mouth, Disp: , Rfl:     levothyroxine 75 mcg tablet, Take 1 tablet (75 mcg total) by mouth daily in the early morning, Disp: 90 tablet, Rfl: 0    Multiple Vitamin (MULTIVITAMIN) tablet, Take 1 tablet by mouth daily  , Disp: , Rfl:     Allergies   Allergen Reactions    Cortisone Rash and GI Intolerance    Ferrous Gluconate      Other reaction(s): Constipation    Ibuprofen Vomiting and Abdominal Pain     Advil    Other      seasonal    Montelukast Anxiety    Penicillins Rash       Oncology History    No history exists  ROS:  03/17/22 Reviewed 12 systems: See symptoms in HPI:  Tiredness  Fibro myalgia  Presently no other neurological, cardiac, pulmonary, GI and  symptoms     Other symptoms are in HPI  No other symptoms like  fever, chills, bleeding, bone pains, skin rash, weight loss,   weakness, numbness,  claudication and gait problem  No frequent infections  Not unusually sensitive to heat or cold  No swelling of the ankles  No swollen glands  Patient is anxious         /76 (BP Location: Right arm, Patient Position: Sitting, Cuff Size: Adult)   Pulse 75   Temp 98 2 °F (36 8 °C)   Resp 16   Ht 5' 4" (1 626 m)   Wt 85 7 kg (189 lb)   LMP 01/18/2014 (Exact Date)   SpO2 99%   BMI 32 44 kg/m²     Physical Exam:  My medical assistant Stew Small was in the room with me during examination    Patient is alert and oriented  Patient is not in distress  Left foot is in a special shoe  Vital signs are above  There is no icterus , no oral thrush, no palpable neck mass, clear lung fields, regular heart rate, abdomen  soft and non tender, no palpable abdominal mass, no ascites, no edema of right ankle, no calf tenderness, no focal neurological deficit, no skin rash, no palpable lymphadenopathy in the neck and axillary areas,  no clubbing  Patient is anxious  Performance status 1  IMAGING:      LABS:    Results for orders placed or performed in visit on 02/06/22   Comprehensive metabolic panel   Result Value Ref Range    Sodium 139 136 - 145 mmol/L    Potassium 3 8 3 5 - 5 3 mmol/L    Chloride 109 (H) 100 - 108 mmol/L    CO2 26 21 - 32 mmol/L    ANION GAP 4 4 - 13 mmol/L    BUN 11 5 - 25 mg/dL    Creatinine 0 77 0 60 - 1 30 mg/dL    Glucose, Fasting 96 65 - 99 mg/dL    Calcium 9 1 8 3 - 10 1 mg/dL    AST 22 5 - 45 U/L    ALT 45 12 - 78 U/L    Alkaline Phosphatase 72 46 - 116 U/L    Total Protein 7 4 6 4 - 8 2 g/dL    Albumin 3 7 3 5 - 5 0 g/dL    Total Bilirubin 0 56 0 20 - 1 00 mg/dL    eGFR 95 ml/min/1 73sq m   LD,Blood   Result Value Ref Range     81 - 234 U/L   TSH, 3rd generation with Free T4 reflex   Result Value Ref Range    TSH 3RD GENERATON 0 703 0 358 - 3 740 uIU/mL   Iron Saturation %   Result Value Ref Range    Iron Saturation 34 15 - 50 %    TIBC 293 250 - 450 ug/dL    Iron 99 50 - 170 ug/dL   Ferritin   Result Value Ref Range    Ferritin 51 8 - 388 ng/mL     Labs, Imaging, & Other studies:   All pertinent labs and imaging studies were personally reviewed    Hemoglobin 12 5    WBC 4200  Platelets [id number]  ANC 2410  Normal differential count  Reviewed and discussed with patient  Assessment and plan:   Patient has history of intermittent mild leukopenia without frequent or severe infections and this problem appears to be of no clinical significance  Previous history of iron deficiency anemia that was secondary to chronic blood loss with menorrhagia  Patient had NEVILLE  No more menorrhagia and no more anemia  She has some tiredness and occasionally myalgia of secondary to fibromyalgia  She follows with her rheumatologist   No history of febrile neutropenia  Patient follows with her urologist for complex cyst in left kidney  History of hypothyroidism  Left foot is in a special boot and she follows with a specialist         Physical examination and test results are as recorded and discussed  Stable hematological status  She probably does not have primary hematological disorder of clinical significance and can be discharged from our services and can be seen as needed for problems  related to our speciality      Discussed the importance of self-breast examination, eating healthy foods, exercises as tolerated and health screening tests     All discussed in detail   Questions answered  Patient is capable of self-care     Discussed precautions against coronavirus     See diagnoses instructions below    1  Leukopenia, unspecified type      Patient can be discharged from our services  She can have blood counts checked once or twice every year or as needed from her primary physician and we can see her as needed for problems related to our speciality  Patient will continue to follow with her primary physician and other consultants            Patient voiced understanding and agreed      Counseling / Coordination of Care      Provided counseling and support

## 2022-03-27 ENCOUNTER — PATIENT MESSAGE (OUTPATIENT)
Dept: FAMILY MEDICINE CLINIC | Facility: CLINIC | Age: 42
End: 2022-03-27

## 2022-03-27 DIAGNOSIS — J34.89 SINUS PRESSURE: Primary | ICD-10-CM

## 2022-03-28 RX ORDER — DOXYCYCLINE HYCLATE 100 MG/1
100 CAPSULE ORAL EVERY 12 HOURS SCHEDULED
Qty: 14 CAPSULE | Refills: 0 | Status: SHIPPED | OUTPATIENT
Start: 2022-03-28 | End: 2022-04-04

## 2022-04-12 DIAGNOSIS — E06.3 HYPOTHYROIDISM DUE TO HASHIMOTO'S THYROIDITIS: ICD-10-CM

## 2022-04-12 DIAGNOSIS — E03.8 HYPOTHYROIDISM DUE TO HASHIMOTO'S THYROIDITIS: ICD-10-CM

## 2022-04-12 RX ORDER — LEVOTHYROXINE SODIUM 0.07 MG/1
TABLET ORAL
Qty: 30 TABLET | Refills: 0 | Status: SHIPPED | OUTPATIENT
Start: 2022-04-12 | End: 2022-05-05

## 2022-05-04 DIAGNOSIS — E03.8 HYPOTHYROIDISM DUE TO HASHIMOTO'S THYROIDITIS: ICD-10-CM

## 2022-05-04 DIAGNOSIS — E06.3 HYPOTHYROIDISM DUE TO HASHIMOTO'S THYROIDITIS: ICD-10-CM

## 2022-05-05 RX ORDER — LEVOTHYROXINE SODIUM 0.07 MG/1
TABLET ORAL
Qty: 30 TABLET | Refills: 0 | Status: SHIPPED | OUTPATIENT
Start: 2022-05-05 | End: 2022-06-06

## 2022-06-01 ENCOUNTER — PATIENT MESSAGE (OUTPATIENT)
Dept: FAMILY MEDICINE CLINIC | Facility: CLINIC | Age: 42
End: 2022-06-01

## 2022-06-01 ENCOUNTER — PATIENT MESSAGE (OUTPATIENT)
Dept: UROLOGY | Facility: AMBULATORY SURGERY CENTER | Age: 42
End: 2022-06-01

## 2022-06-01 DIAGNOSIS — Z00.00 ANNUAL PHYSICAL EXAM: ICD-10-CM

## 2022-06-01 DIAGNOSIS — E06.3 HYPOTHYROIDISM DUE TO HASHIMOTO'S THYROIDITIS: Primary | ICD-10-CM

## 2022-06-01 DIAGNOSIS — E03.8 HYPOTHYROIDISM DUE TO HASHIMOTO'S THYROIDITIS: Primary | ICD-10-CM

## 2022-06-02 ENCOUNTER — PATIENT MESSAGE (OUTPATIENT)
Dept: UROLOGY | Facility: AMBULATORY SURGERY CENTER | Age: 42
End: 2022-06-02

## 2022-06-02 ENCOUNTER — RA CDI HCC (OUTPATIENT)
Dept: OTHER | Facility: HOSPITAL | Age: 42
End: 2022-06-02

## 2022-06-02 ENCOUNTER — TELEPHONE (OUTPATIENT)
Dept: UROLOGY | Facility: AMBULATORY SURGERY CENTER | Age: 42
End: 2022-06-02

## 2022-06-02 NOTE — PROGRESS NOTES
[facility]  coding opportunities       Chart reviewed, no opportunity found: CHART REVIEWED, NO OPPORTUNITY FOUND        Patients Insurance        Commercial Insurance: [address] [place] [address]

## 2022-06-02 NOTE — TELEPHONE ENCOUNTER
Patient last seen in May 2018, since it has been >3 years patient is considered a new patient and will need an office visit

## 2022-06-02 NOTE — TELEPHONE ENCOUNTER
Regarding: Kidney ultrasound   ----- Message from Asia Freeman RN sent at 6/2/2022  9:12 AM EDT -----  Pt last seen in 2018, has canceled previous OV appts for an US and to review  Can an ultrasound be ordered or is she considered a new patient?     ----- Message from Gorge Sorto to Oj Burnett MD sent at 6/1/2022  5:26 PM -----   I was wondering if I am due for a kidney ultrasound? I don't see anything in my chart, but I know it's been more than 2 years      Thank you,  Willi Feng

## 2022-06-06 ENCOUNTER — HOSPITAL ENCOUNTER (OUTPATIENT)
Dept: MAMMOGRAPHY | Facility: HOSPITAL | Age: 42
Discharge: HOME | End: 2022-06-06
Payer: COMMERCIAL

## 2022-06-06 ENCOUNTER — OFFICE VISIT (OUTPATIENT)
Dept: UROLOGY | Facility: AMBULATORY SURGERY CENTER | Age: 42
End: 2022-06-06
Payer: COMMERCIAL

## 2022-06-06 VITALS
SYSTOLIC BLOOD PRESSURE: 130 MMHG | DIASTOLIC BLOOD PRESSURE: 90 MMHG | BODY MASS INDEX: 32.27 KG/M2 | HEART RATE: 81 BPM | OXYGEN SATURATION: 99 % | HEIGHT: 64 IN | WEIGHT: 189 LBS

## 2022-06-06 VITALS — BODY MASS INDEX: 32.44 KG/M2 | HEIGHT: 64 IN

## 2022-06-06 DIAGNOSIS — Z12.31 ENCOUNTER FOR SCREENING MAMMOGRAM FOR BREAST CANCER: ICD-10-CM

## 2022-06-06 DIAGNOSIS — E03.8 HYPOTHYROIDISM DUE TO HASHIMOTO'S THYROIDITIS: ICD-10-CM

## 2022-06-06 DIAGNOSIS — N28.1 RENAL CYST: Primary | ICD-10-CM

## 2022-06-06 DIAGNOSIS — E06.3 HYPOTHYROIDISM DUE TO HASHIMOTO'S THYROIDITIS: ICD-10-CM

## 2022-06-06 PROCEDURE — 77063 BREAST TOMOSYNTHESIS BI: CPT

## 2022-06-06 PROCEDURE — 77067 SCR MAMMO BI INCL CAD: CPT

## 2022-06-06 PROCEDURE — 99213 OFFICE O/P EST LOW 20 MIN: CPT | Performed by: NURSE PRACTITIONER

## 2022-06-06 RX ORDER — LEVOTHYROXINE SODIUM 0.07 MG/1
TABLET ORAL
Qty: 30 TABLET | Refills: 0 | Status: SHIPPED | OUTPATIENT
Start: 2022-06-06 | End: 2022-06-09

## 2022-06-06 NOTE — PROGRESS NOTES
6/6/2022    Assessment and Plan    36 y o  female managed by Dr Daphnie Diallo    1  Renal cyst  · Previous MRI abdomen reveals Bosniak 2 score left renal cyst 05/2018  · Ultrasound of kidney and bladder performed 11/2020 reveals a septated cyst in the left upper pole with no change in size or appearance from prior evaluation  · Most recent renal function performed 06/2022 unremarkable  · Ultrasound of kidney and bladder ordered  · Will call with results of ultrasound kidney bladder  If there is no significant change the size reports sensation of cyst can consider follow-up in 2 years  History of Present Illness  Barbara Hull is a 36 y o  female here for follow up evaluation of  complex left upper pole renal cyst with most recent diagnostic imaging performed 11/2020 with no change in size or appearance of cyst   She previously had MRI of the abdomen which revealed a Bosniak 2 cyst 05/2018  Unk Jorge She currently denies all lower urinary tract symptoms  She denies suprapubic abdominal pain and flank pain  She denies changes to her general health since her prior office evaluation  Review of Systems   Constitutional: Negative for chills and fever  Respiratory: Negative for cough and shortness of breath  Cardiovascular: Negative for chest pain  Gastrointestinal: Negative for abdominal distention, abdominal pain, blood in stool, nausea and vomiting  Genitourinary: Negative for difficulty urinating, dysuria, enuresis, flank pain, frequency, hematuria and urgency  Skin: Negative for rash  Urinary Incontinence Screening    Flowsheet Row Most Recent Value   Urinary Incontinence    Urinary Incontinence? No   Incomplete emptying? No   Urinary frequency? No   Urinary urgency? No   Urinary hesitancy? No   Dysuria (painful difficult urination)? No   Nocturia (waking up to use the bathroom)? No   Straining (having to push to go)? No   Weak stream? No   Intermittent stream? No   Post void dribbling?  No Vaginal pressure? No   Vaginal dryness? No                 Past Medical History  Past Medical History:   Diagnosis Date    Abnormal blood chemistry     last assessed [id number]    Allergic 1990    Anemia 1998    Anxiety 2013    Arthritis     Cervical radiculopathy     last assessed [id number]    Depression 2018    It comes and goes  I think its related to my thyroid      Disease of thyroid gland     GERD (gastroesophageal reflux disease) 2017    Heart murmur 2005    HL (hearing loss) 1981    Leukopenia     Nonrheumatic mitral valve insufficiency 06/2013    Otitis media 1990    Plantar fasciitis of left foot 10/2021    Pneumonia     Psychogenic nonepileptic seizure     Seizures ([address] ) 1984    Stopped when it turned 12    Viral gastroenteritis     Resolving, [place] diet, Stay hydrated;  last assessed [id number]    Vitamin B 12 deficiency     Vitamin D deficiency        Past Social History  Past Surgical History:   Procedure Laterality Date    HYSTERECTOMY      TONSILLECTOMY      TYMPANOSTOMY TUBE PLACEMENT Bilateral     WISDOM TOOTH EXTRACTION       Social History     Tobacco Use   Smoking Status Never Smoker   Smokeless Tobacco Never Used       Past Family History  Family History   Problem Relation Age of Onset    Other Father         exposed to chemical contaminants-dioxin    Heart attack Father     Arthritis Father     Asthma Father     Cancer Father         Skin cancer on his nose    COPD Father         Agent orange    Hearing loss Father     Alzheimer's disease Family     Arthritis Family     Bone cancer Family     Breast cancer Family     Heart disease Family     Hypertension Family     Stroke Family     Thyroid disease Family     Cancer Family     Skin cancer Family     Arthritis Mother     Anxiety disorder Mother     Osteoporosis Mother     Hypertension Mother     Thyroid disease Mother     Breast cancer Maternal Grandmother 39    Dementia Maternal Grandmother     Anxiety disorder Maternal Grandmother     Cancer Maternal Grandfather     Lung cancer Maternal Grandfather     Heart attack Paternal Grandmother     Heart failure Paternal Grandfather     Stroke Paternal Grandfather     No Known Problems Son     Asthma Son    Lisandro Recinos Learning disabilities Son         I have it to   Diabetes Neg Hx     Colon cancer Neg Hx        Past Social history  Social History     Socioeconomic History    Marital status: /Civil Union     Spouse name: Not on file    Number of children: 2    Years of education: 15    Highest education level: Not on file   Occupational History    Not on file   Tobacco Use    Smoking status: Never Smoker    Smokeless tobacco: Never Used   Vaping Use    Vaping Use: Never used   Substance and Sexual Activity    Alcohol use: No    Drug use: No    Sexual activity: Not Currently     Partners: Male     Birth control/protection: None   Other Topics Concern    Not on file   Social History Narrative    Denied caffeine use     Social Determinants of Health     Financial Resource Strain: Not on file   Food Insecurity: Not on file   Transportation Needs: Not on file   Physical Activity: Not on file   Stress: Not on file   Social Connections: Not on file   Intimate Partner Violence: Not on file   Housing Stability: Not on file       Current Medications  Current Outpatient Medications   Medication Sig Dispense Refill    Bioflavonoid Products (IDANIA C PO) Take 1 tablet by mouth daily      cetirizine (ZyrTEC) 10 mg tablet Take 10 mg by mouth daily      Cholecalciferol (VITAMIN D) 2000 units tablet Take by mouth      levothyroxine 75 mcg tablet TAKE ONE TABLET BY MOUTH EVERY DAY IN THE EARLY MORNING 30 tablet 0    Multiple Vitamin (MULTIVITAMIN) tablet Take 1 tablet by mouth daily  No current facility-administered medications for this visit         Allergies  Allergies   Allergen Reactions    Cortisone Rash and GI Intolerance    Ferrous Gluconate Other reaction(s): Constipation    Ibuprofen Vomiting and Abdominal Pain     Advil    Other      seasonal    Montelukast Anxiety    Penicillins Rash         The following portions of the patient's history were reviewed and updated as appropriate: allergies, current medications, past medical history, past social history, past surgical history and problem list       Vitals  Vitals:    06/06/22 1304   BP: 130/90   BP Location: Left arm   Patient Position: Sitting   Cuff Size: Adult   Pulse: 81   SpO2: 99%   Weight: 85 7 kg (189 lb)   Height: 5' 4" (1 626 m)           Physical Exam  Physical Exam  Vitals reviewed  Constitutional:       General: She is not in acute distress  Appearance: Normal appearance  Cardiovascular:      Rate and Rhythm: Normal rate and regular rhythm  Heart sounds: Normal heart sounds  Pulmonary:      Effort: Pulmonary effort is normal  No respiratory distress  Breath sounds: Normal breath sounds  Abdominal:      Tenderness: There is no right CVA tenderness or left CVA tenderness  Musculoskeletal:         General: Normal range of motion  Skin:     General: Skin is warm and dry  Neurological:      General: No focal deficit present  Mental Status: She is alert  Psychiatric:         Mood and Affect: Mood normal          Behavior: Behavior normal            Results  No results found for this or any previous visit (from the past 1 hour(s))  ]  No results found for: PSA  Lab Results   Component Value Date    CALCIUM 9 1 02/06/2022    K 3 8 02/06/2022    CO2 26 02/06/2022     (H) 02/06/2022    BUN 11 02/06/2022    CREATININE 0 77 02/06/2022     Lab Results   Component Value Date    WBC 4 22 (L) 02/06/2022    HGB 12 5 02/06/2022    HCT 39 1 02/06/2022    MCV 93 02/06/2022     02/06/2022           Orders  Orders Placed This Encounter   Procedures    US kidney and bladder     Standing Status:   Future     Standing Expiration Date:   6/6/2026 Scheduling Instructions:      "Prep required if being scheduled in conjunction with other studies, refer to those examination's Preps first before scheduling  All patients for US Kidney and Bladder they must drink 24 oz of water 60 minutes before your scheduled appointment time  This test requires you to have a FULL bladder  Please do not urinate before your test             Please bring your physician order, insurance cards, a form of photo ID and a list of your medications with you  Arrive 15 minutes prior to your appointment time in order to register  If you need to have lab work or a urinalysis, please do this AFTER your ultrasound  "     Order Specific Question:   Reason for Exam:     Answer:   renal cyst     Order Specific Question:   Is the patient pregnant?      Answer:   No       DENNY Lorenzana

## 2022-06-08 ENCOUNTER — HOSPITAL ENCOUNTER (OUTPATIENT)
Dept: RADIOLOGY | Age: 42
Discharge: HOME | End: 2022-06-08
Payer: COMMERCIAL

## 2022-06-08 DIAGNOSIS — N28.1 RENAL CYST: ICD-10-CM

## 2022-06-08 PROCEDURE — 76770 US EXAM ABDO BACK WALL COMP: CPT

## 2022-06-09 ENCOUNTER — OFFICE VISIT (OUTPATIENT)
Dept: FAMILY MEDICINE CLINIC | Facility: CLINIC | Age: 42
End: 2022-06-09
Payer: COMMERCIAL

## 2022-06-09 VITALS
HEART RATE: 89 BPM | BODY MASS INDEX: 32.44 KG/M2 | WEIGHT: 190 LBS | HEIGHT: 64 IN | OXYGEN SATURATION: 99 % | DIASTOLIC BLOOD PRESSURE: 96 MMHG | SYSTOLIC BLOOD PRESSURE: 124 MMHG | TEMPERATURE: 97.7 F

## 2022-06-09 DIAGNOSIS — Z23 IMMUNIZATION DUE: ICD-10-CM

## 2022-06-09 DIAGNOSIS — E06.3 HYPOTHYROIDISM DUE TO HASHIMOTO'S THYROIDITIS: ICD-10-CM

## 2022-06-09 DIAGNOSIS — Z00.00 ANNUAL PHYSICAL EXAM: Primary | ICD-10-CM

## 2022-06-09 DIAGNOSIS — D22.9 BENIGN PIGMENTED MOLE: ICD-10-CM

## 2022-06-09 DIAGNOSIS — E03.8 HYPOTHYROIDISM DUE TO HASHIMOTO'S THYROIDITIS: ICD-10-CM

## 2022-06-09 PROBLEM — H72.92 PERFORATION OF LEFT TYMPANIC MEMBRANE: Status: RESOLVED | Noted: 2020-07-30 | Resolved: 2022-06-09

## 2022-06-09 PROBLEM — D72.819 LEUKOPENIA: Status: RESOLVED | Noted: 2018-03-20 | Resolved: 2022-06-09

## 2022-06-09 PROBLEM — E61.1 IRON DEFICIENCY: Status: RESOLVED | Noted: 2018-03-20 | Resolved: 2022-06-09

## 2022-06-09 PROCEDURE — 90715 TDAP VACCINE 7 YRS/> IM: CPT

## 2022-06-09 PROCEDURE — 90471 IMMUNIZATION ADMIN: CPT

## 2022-06-09 PROCEDURE — 3725F SCREEN DEPRESSION PERFORMED: CPT | Performed by: FAMILY MEDICINE

## 2022-06-09 PROCEDURE — 99396 PREV VISIT EST AGE 40-64: CPT | Performed by: FAMILY MEDICINE

## 2022-06-09 RX ORDER — LEVOTHYROXINE SODIUM 88 UG/1
88 TABLET ORAL DAILY
Qty: 30 TABLET | Refills: 1 | Status: SHIPPED | OUTPATIENT
Start: 2022-06-09 | End: 2022-08-08

## 2022-06-09 NOTE — PATIENT INSTRUCTIONS

## 2022-06-09 NOTE — PROGRESS NOTES
ADULT ANNUAL [address]    NAME: Melia Glez  AGE: 36 y o  SEX: female  : 1980   DATE: 2022     Assessment and Plan:     Problem List Items Addressed This Visit        Endocrine    Hypothyroidism     Due to symptoms, recommend 88mcg QD (from 75)  Repeat TSH 4-6 weeks            Relevant Medications    levothyroxine (Euthyrox) 88 mcg tablet    Other Relevant Orders    TSH, 3rd generation with Free T4 reflex      Other Visit Diagnoses     Annual physical exam    -  Primary    Immunization due        Relevant Orders    TDAP VACCINE GREATER THAN OR EQUAL TO 8YO IM (Completed)    Benign pigmented mole        Relevant Orders    Ambulatory Referral to Plastic Surgery    BMI 32 0-32 9,adult            Immunizations and preventive care screenings were discussed with patient today  Appropriate education was printed on patient's after visit summary  Counseling:  Alcohol/drug use: discussed moderation in alcohol intake, the recommendations for healthy alcohol use, and avoidance of illicit drug use  Dental Health: discussed importance of regular tooth brushing, flossing, and dental visits  Injury prevention: discussed safety/seat belts, safety helmets, smoke detectors, carbon dioxide detectors, and smoking near bedding or upholstery  Exercise: the importance of regular exercise/physical activity was discussed  Recommend exercise 3-5 times per week for at least 30 minutes  BMI Counseling: Body mass index is 32 61 kg/m²  The BMI is above normal  Nutrition recommendations include decreasing portion sizes, encouraging healthy choices of fruits and vegetables and limiting drinks that contain sugar  Exercise recommendations include moderate physical activity 150 minutes/week, exercising 3-5 times per week and strength training exercises  Patient referred to PCP   Rationale for BMI follow-up plan is due to patient being overweight or obese  Depression Screening and Follow-up Plan: Patient was screened for depression during today's encounter  They screened negative with a PHQ-2 score of 0  Return in about 1 year (around 6/9/2023) for Annual physical      Chief Complaint:     Chief Complaint   Patient presents with    Physical Exam      History of Present Illness:     Adult Annual Physical   Patient here for a comprehensive physical exam  The patient reports problems - brain fog, fatigue  Patient has mole on face she wants removed  Diet and Physical Activity  Diet/Nutrition: well balanced diet  Exercise: walking  Depression Screening  PHQ-2/9 Depression Screening    Little interest or pleasure in doing things: 0 - not at all  Feeling down, depressed, or hopeless: 0 - not at all  PHQ-2 Score: 0  PHQ-2 Interpretation: Negative depression screen       General Health  Sleep: sleeps well  Hearing: normal - bilateral   Vision: most recent eye exam <1 year ago and wears glasses  Dental: regular dental visits  /GYN Health  Patient is: postmenopausal  Last menstrual period: Patient's last menstrual period was 01/18/2014 (exact date)  Review of Systems:     Review of Systems   Constitutional: Positive for fatigue  Negative for activity change, chills and fever  HENT: Negative for congestion, rhinorrhea and sore throat  Eyes: Negative for visual disturbance  Respiratory: Negative for cough, shortness of breath and wheezing  Cardiovascular: Negative for chest pain and palpitations  Gastrointestinal: Negative for abdominal pain, blood in stool, constipation, diarrhea, nausea and vomiting  Genitourinary: Negative for dysuria  Musculoskeletal: Negative for arthralgias and myalgias  Skin: Negative for rash  Neurological: Negative for dizziness, weakness and headaches  Psychiatric/Behavioral: Negative for dysphoric mood  All other systems reviewed and are negative       Past Medical History:     Past Medical History:   Diagnosis Date    Abnormal blood chemistry     last assessed [id number]    Allergic 1990    Anemia 1998    Anxiety 2013    Arthritis     Cervical radiculopathy     last assessed [id number]    Depression 2018    It comes and goes  I think its related to my thyroid      Disease of thyroid gland     GERD (gastroesophageal reflux disease) 2017    Heart murmur 2005    HL (hearing loss) 1981    Leukopenia     Nonrheumatic mitral valve insufficiency 06/2013    Otitis media 1990    Perforation of left tympanic membrane 7/30/2020    Plantar fasciitis of left foot 10/2021    Pneumonia     Psychogenic nonepileptic seizure     Seizures ([address] ) 1984    Stopped when it turned 12    Viral gastroenteritis     Resolving, [place] diet, Stay hydrated;  last assessed [id number]    Vitamin B 12 deficiency     Vitamin D deficiency       Past Surgical History:     Past Surgical History:   Procedure Laterality Date    HYSTERECTOMY      age 27   [facility] TONSILLECTOMY      TYMPANOSTOMY TUBE PLACEMENT Bilateral     WISDOM TOOTH EXTRACTION        Social History:     Social History     Socioeconomic History    Marital status: /Civil Union     Spouse name: None    Number of children: 2    Years of education: 15    Highest education level: None   Occupational History    None   Tobacco Use    Smoking status: Never Smoker    Smokeless tobacco: Never Used   Vaping Use    Vaping Use: Never used   Substance and Sexual Activity    Alcohol use: No    Drug use: No    Sexual activity: Not Currently     Partners: Male     Birth control/protection: None   Other Topics Concern    None   Social History Narrative    Denied caffeine use     Social Determinants of Health     Financial Resource Strain: Not on file   Food Insecurity: Not on file   Transportation Needs: Not on file   Physical Activity: Not on file   Stress: Not on file   Social Connections: Not on file   Intimate Partner Violence: Not on file Housing Stability: Not on file      Family History:     Family History   Problem Relation Age of Onset    Arthritis Mother     Anxiety disorder Mother     Osteoporosis Mother     Hypertension Mother     Thyroid disease Mother     Other Father         exposed to chemical contaminants-dioxin    Heart attack Father     Arthritis Father     Asthma Father     Cancer Father         Skin cancer on his nose    COPD Father         Agent orange    Hearing loss Father     Breast cancer Maternal Grandmother 39    Dementia Maternal Grandmother     Anxiety disorder Maternal Grandmother     Cancer Maternal Grandfather     Lung cancer Maternal Grandfather     Heart attack Paternal Grandmother     Heart failure Paternal Grandfather     Stroke Paternal Grandfather     No Known Problems Son     Asthma Son    Chu Flower Learning disabilities Son         I have it to   Alzheimer's disease Family     Arthritis Family     Bone cancer Family     Breast cancer Family     Heart disease Family     Hypertension Family     Stroke Family     Thyroid disease Family     Cancer Family     Skin cancer Family     No Known Problems Maternal Aunt     No Known Problems Maternal Aunt     No Known Problems Maternal Aunt     Diabetes Neg Hx     Colon cancer Neg Hx       Current Medications:     Current Outpatient Medications   Medication Sig Dispense Refill    Bioflavonoid Products (IDANIA C PO) Take 1 tablet by mouth daily      cetirizine (ZyrTEC) 10 mg tablet Take 10 mg by mouth daily      Cholecalciferol (VITAMIN D) 2000 units tablet Take by mouth      levothyroxine (Euthyrox) 88 mcg tablet Take 1 tablet (88 mcg total) by mouth daily 30 tablet 1    Multiple Vitamin (MULTIVITAMIN) tablet Take 1 tablet by mouth daily  No current facility-administered medications for this visit  Allergies:      Allergies   Allergen Reactions    Cortisone Rash and GI Intolerance    Ferrous Gluconate      Other reaction(s): Constipation    Ibuprofen Vomiting and Abdominal Pain     Advil    Other      seasonal    Montelukast Anxiety    Penicillins Rash      Physical Exam:     /96   Pulse 89   Temp 97 7 °F (36 5 °C)   Ht 5' 4" (1 626 m)   Wt 86 2 kg (190 lb)   LMP 01/18/2014 (Exact Date)   SpO2 99%   BMI 32 61 kg/m²     Physical Exam  Vitals and nursing note reviewed  Constitutional:       General: She is not in acute distress  Appearance: She is well-developed  She is not ill-appearing  HENT:      Head: Normocephalic and atraumatic  Right Ear: Tympanic membrane, ear canal and external ear normal  No middle ear effusion  Left Ear: Tympanic membrane, ear canal and external ear normal   No middle ear effusion  Nose: Nose normal  No congestion or rhinorrhea  Mouth/Throat:      Lips: Pink  Mouth: Mucous membranes are moist       Pharynx: Oropharynx is clear  Uvula midline  No oropharyngeal exudate  Tonsils: No tonsillar exudate  Eyes:      General: Lids are normal       Extraocular Movements: Extraocular movements intact  Conjunctiva/sclera: Conjunctivae normal       Pupils: Pupils are equal, round, and reactive to light  Neck:      Thyroid: No thyromegaly  Trachea: No tracheal deviation  Cardiovascular:      Rate and Rhythm: Normal rate and regular rhythm  Pulses: Normal pulses  Heart sounds: Normal heart sounds, S1 normal and S2 normal  No murmur heard  Pulmonary:      Effort: Pulmonary effort is normal  No respiratory distress  Breath sounds: Normal breath sounds  No decreased breath sounds, wheezing, rhonchi or rales  Abdominal:      General: Bowel sounds are normal  There is no distension  Palpations: Abdomen is soft  Tenderness: There is no abdominal tenderness  Musculoskeletal:      Right lower leg: No edema  Left lower leg: No edema  Lymphadenopathy:      Cervical: No cervical adenopathy     Skin:     General: Skin is warm and dry       Capillary Refill: Capillary refill takes less than 2 seconds  Neurological:      Mental Status: She is alert and oriented to person, place, and time  Cranial Nerves: Cranial nerves are intact  Deep Tendon Reflexes: Reflexes normal       Reflex Scores:       Patellar reflexes are 2+ on the right side and 2+ on the left side  Psychiatric:         Attention and Perception: Attention normal          Mood and Affect: Mood normal          Thought Content: Thought content does not include suicidal ideation          MD Colton Méndez

## 2022-06-13 ENCOUNTER — CONSULT (OUTPATIENT)
Dept: PLASTIC SURGERY | Facility: CLINIC | Age: 42
End: 2022-06-13
Payer: COMMERCIAL

## 2022-06-13 ENCOUNTER — TELEPHONE (OUTPATIENT)
Dept: PLASTIC SURGERY | Facility: CLINIC | Age: 42
End: 2022-06-13

## 2022-06-13 VITALS
DIASTOLIC BLOOD PRESSURE: 59 MMHG | SYSTOLIC BLOOD PRESSURE: 105 MMHG | TEMPERATURE: 97.6 F | HEART RATE: 80 BPM | HEIGHT: 64 IN | BODY MASS INDEX: 31.92 KG/M2 | WEIGHT: 187 LBS

## 2022-06-13 DIAGNOSIS — D22.9 BENIGN PIGMENTED MOLE: ICD-10-CM

## 2022-06-13 PROCEDURE — 99244 OFF/OP CNSLTJ NEW/EST MOD 40: CPT | Performed by: PHYSICIAN ASSISTANT

## 2022-06-13 PROCEDURE — 3008F BODY MASS INDEX DOCD: CPT | Performed by: PHYSICIAN ASSISTANT

## 2022-06-13 PROCEDURE — 1036F TOBACCO NON-USER: CPT | Performed by: PHYSICIAN ASSISTANT

## 2022-06-13 NOTE — PROGRESS NOTES
Assessment/Plan:    Patient is a 79-year-old female referred by her PCP for evaluation of a skin lesion of her right cheek  Please see HPI  I discussed surgical excision  Patient understood and agreed  This will be done under general anesthesia  Discussed options, including forgoing surgery, as well as benefits and risks of surgery including but not limited to anesthesia, bleeding, infection, scarring and potential need for additional procedures  Consent was obtained and all questions answered to their satisfaction  We will plan for surgery at their earliest convenience  No problem-specific Assessment & Plan notes found for this encounter  Diagnoses and all orders for this visit:    Benign pigmented mole  -     Ambulatory Referral to Plastic Surgery          Subjective:      Patient ID: Rashad Older is a 36 y o  female  HPI     Patient presents to the office for evaluation of a skin lesion of her right cheek, inferior to her medial canthus  The patient reports that this lesion has been present for her and higher life, and has grown with her over time  She reports that she is now bothered by this lesion as it is rubbing on her mask  She reports no drainage, bleeding, itching, pain, change in color or texture  Upon evaluation, the patient has an approximately 0 8 mm round, raised, flesh toned protruding lesion on her right cheek  Please see photo in media      The following portions of the patient's history were reviewed and updated as appropriate:   She  has a past medical history of Abnormal blood chemistry, Allergic (1990), Anemia (1998), Anxiety (2013), Arthritis, Cervical radiculopathy, Depression (2018), Disease of thyroid gland, GERD (gastroesophageal reflux disease) (2017), Heart murmur (2005), HL (hearing loss) (1981), Leukopenia, Nonrheumatic mitral valve insufficiency (06/2013), Otitis media (1990), Perforation of left tympanic membrane (7/30/2020), Plantar fasciitis of left foot (10/2021), Pneumonia, Psychogenic nonepileptic seizure, Seizures ([facility] [address] ) (1984), Viral gastroenteritis, Vitamin B 12 deficiency, and Vitamin D deficiency  She   Patient Active Problem List    Diagnosis Date Noted    Family history of early CAD 05/01/2020    Mixed conductive and sensorineural hearing loss of both ears 03/03/2020    Complex regional pain syndrome type 2 of left lower extremity 06/13/2018    Hypothyroidism 04/10/2018    Non-rheumatic mitral regurgitation 03/06/2018    Fibromyalgia 10/31/2017    Anxiety 03/05/2017    Acquired complex cyst of kidney 01/20/2016    Arachnoid cyst 01/31/2014    Allergic rhinitis 01/30/2014     She  has a past surgical history that includes Hysterectomy; Tympanostomy tube placement (Bilateral); Stambaugh tooth extraction; and Tonsillectomy  Her family history includes Alzheimer's disease in her family; Anxiety disorder in her maternal grandmother and mother; Arthritis in her family, father, and mother; Asthma in her father and son; Bone cancer in her family; Breast cancer in her family; Breast cancer (age of onset: 39) in her maternal grandmother; COPD in her father; Cancer in her family, father, and maternal grandfather; Dementia in her maternal grandmother; Hearing loss in her father; Heart attack in her father and paternal grandmother; Heart disease in her family; Heart failure in her paternal grandfather; Hypertension in her family and mother; Learning disabilities in her son; Lung cancer in her maternal grandfather; No Known Problems in her maternal aunt, maternal aunt, maternal aunt, and son; Osteoporosis in her mother; Other in her father; Skin cancer in her family; Stroke in her family and paternal grandfather; Thyroid disease in her family and mother  She  reports that she has never smoked  She has never used smokeless tobacco  She reports that she does not drink alcohol and does not use drugs    Current Outpatient Medications   Medication Sig Dispense Refill    Bioflavonoid Products (IDANIA C PO) Take 1 tablet by mouth daily      cetirizine (ZyrTEC) 10 mg tablet Take 10 mg by mouth daily      Cholecalciferol (VITAMIN D) 2000 units tablet Take by mouth      levothyroxine (Euthyrox) 88 mcg tablet Take 1 tablet (88 mcg total) by mouth daily 30 tablet 1    Multiple Vitamin (MULTIVITAMIN) tablet Take 1 tablet by mouth daily  No current facility-administered medications for this visit       Review of Systems    A 12 point review systems was completed and is negative except as per HPI    Objective:      /59   Pulse 80   Temp 97 6 °F (36 4 °C)   Ht 5' 4" (1 626 m)   Wt 84 8 kg (187 lb)   LMP 01/18/2014 (Exact Date)   BMI 32 10 kg/m²          Physical Exam  Vitals and nursing note reviewed  Constitutional:       General: She is not in acute distress  Appearance: Normal appearance  She is normal weight  She is not ill-appearing  HENT:      Head: Normocephalic and atraumatic  Nose: Nose normal       Mouth/Throat:      Mouth: Mucous membranes are moist    Eyes:      Extraocular Movements: Extraocular movements intact  Conjunctiva/sclera: Conjunctivae normal       Pupils: Pupils are equal, round, and reactive to light  Neck:      Vascular: No carotid bruit  Cardiovascular:      Rate and Rhythm: Normal rate and regular rhythm  Pulses: Normal pulses  Heart sounds: Normal heart sounds  Pulmonary:      Effort: Pulmonary effort is normal  No respiratory distress  Breath sounds: Normal breath sounds  Abdominal:      General: Abdomen is flat  Palpations: Abdomen is soft  Musculoskeletal:         General: No swelling, tenderness, deformity or signs of injury  Normal range of motion  Cervical back: Normal range of motion and neck supple  No rigidity or tenderness  Lymphadenopathy:      Cervical: No cervical adenopathy  Skin:     General: Skin is warm and dry  Findings: Lesion present        Comments: See HPI   Neurological:      General: No focal deficit present  Mental Status: She is alert and oriented to person, place, and time  Cranial Nerves: No cranial nerve deficit  Sensory: No sensory deficit  Motor: No weakness     Psychiatric:         Mood and Affect: Mood normal          Behavior: Behavior normal

## 2022-07-01 ENCOUNTER — TELEPHONE (OUTPATIENT)
Dept: PLASTIC SURGERY | Facility: CLINIC | Age: 42
End: 2022-07-01

## 2022-08-06 DIAGNOSIS — E06.3 HYPOTHYROIDISM DUE TO HASHIMOTO'S THYROIDITIS: ICD-10-CM

## 2022-08-06 DIAGNOSIS — E03.8 HYPOTHYROIDISM DUE TO HASHIMOTO'S THYROIDITIS: ICD-10-CM

## 2022-08-08 ENCOUNTER — ANNUAL EXAM (OUTPATIENT)
Dept: OBGYN CLINIC | Facility: CLINIC | Age: 42
End: 2022-08-08
Payer: COMMERCIAL

## 2022-08-08 ENCOUNTER — PATIENT MESSAGE (OUTPATIENT)
Dept: FAMILY MEDICINE CLINIC | Facility: CLINIC | Age: 42
End: 2022-08-08

## 2022-08-08 VITALS
DIASTOLIC BLOOD PRESSURE: 64 MMHG | WEIGHT: 184 LBS | HEIGHT: 64 IN | SYSTOLIC BLOOD PRESSURE: 122 MMHG | BODY MASS INDEX: 31.41 KG/M2

## 2022-08-08 DIAGNOSIS — G57.72 COMPLEX REGIONAL PAIN SYNDROME TYPE 2 OF LEFT LOWER EXTREMITY: ICD-10-CM

## 2022-08-08 DIAGNOSIS — Z01.419 ENCOUNTER FOR WELL WOMAN EXAM: Primary | ICD-10-CM

## 2022-08-08 DIAGNOSIS — E06.3 HYPOTHYROIDISM DUE TO HASHIMOTO'S THYROIDITIS: Primary | ICD-10-CM

## 2022-08-08 DIAGNOSIS — M79.7 FIBROMYALGIA: ICD-10-CM

## 2022-08-08 DIAGNOSIS — E03.8 HYPOTHYROIDISM DUE TO HASHIMOTO'S THYROIDITIS: Primary | ICD-10-CM

## 2022-08-08 DIAGNOSIS — Z90.710 H/O: HYSTERECTOMY: ICD-10-CM

## 2022-08-08 DIAGNOSIS — Z12.31 ENCOUNTER FOR SCREENING MAMMOGRAM FOR BREAST CANCER: ICD-10-CM

## 2022-08-08 DIAGNOSIS — Z12.39 ENCOUNTER FOR SCREENING BREAST EXAMINATION: ICD-10-CM

## 2022-08-08 PROCEDURE — S0612 ANNUAL GYNECOLOGICAL EXAMINA: HCPCS | Performed by: PHYSICIAN ASSISTANT

## 2022-08-08 RX ORDER — LEVOTHYROXINE SODIUM 88 UG/1
TABLET ORAL
Qty: 30 TABLET | Refills: 1 | Status: SHIPPED | OUTPATIENT
Start: 2022-08-08 | End: 2022-10-04 | Stop reason: SDUPTHER

## 2022-08-09 PROBLEM — Z90.710 H/O: HYSTERECTOMY: Status: ACTIVE | Noted: 2022-08-09

## 2022-08-09 NOTE — PROGRESS NOTES
Assessment/Plan:    No problem-specific Assessment & Plan notes found for this encounter  Diagnoses and all orders for this visit:    Encounter for well woman exam    Encounter for screening breast examination    Encounter for screening mammogram for breast cancer  -     Mammo screening bilateral w 3d & cad; Future    H/O: hysterectomy          Subjective:      Patient ID: Tariq Terry is a 36 y o  female  Pt presents for her annual exam today--  She has no complaints  She has no bleeding or pelvic pain--hyster  Bowel and bladder are regular  No breast concerns today  Last mammo--6/2022      No pap today  rx mammo  Daily ca, d      The following portions of the patient's history were reviewed and updated as appropriate: allergies, current medications, past family history, past medical history, past social history, past surgical history and problem list     Review of Systems   Constitutional: Negative for chills, fever and unexpected weight change  Gastrointestinal: Negative for abdominal pain, blood in stool, constipation and diarrhea  Genitourinary: Negative  Objective:      /64   Ht 5' 4" (1 626 m)   Wt 83 5 kg (184 lb)   LMP 01/18/2014 (Exact Date)   BMI 31 58 kg/m²          Physical Exam  Vitals and nursing note reviewed  Constitutional:       Appearance: She is well-developed  HENT:      Head: Normocephalic and atraumatic  Chest:   Breasts:      Right: No inverted nipple, mass, nipple discharge or skin change  Left: No inverted nipple, mass, nipple discharge or skin change  Abdominal:      Palpations: Abdomen is soft  Genitourinary:     Exam position: Supine  Labia:         Right: No rash, tenderness or lesion  Left: No rash, tenderness or lesion  Vagina: Normal       Cervix: No cervical motion tenderness, discharge or friability  Uterus: Absent  Adnexa:         Right: No mass, tenderness or fullness            Left: No mass, tenderness or fullness  Musculoskeletal:      Cervical back: Normal range of motion  Lymphadenopathy:      Lower Body: No right inguinal adenopathy  No left inguinal adenopathy

## 2022-09-22 PROBLEM — H72.91 PERFORATION OF RIGHT TYMPANIC MEMBRANE: Status: ACTIVE | Noted: 2022-09-22

## 2022-09-22 PROBLEM — H72.93 PERFORATION OF BOTH TYMPANIC MEMBRANES: Status: ACTIVE | Noted: 2022-09-22

## 2022-10-01 ENCOUNTER — PATIENT MESSAGE (OUTPATIENT)
Dept: FAMILY MEDICINE CLINIC | Facility: CLINIC | Age: 42
End: 2022-10-01

## 2022-10-01 DIAGNOSIS — J34.89 SINUS PRESSURE: Primary | ICD-10-CM

## 2022-10-03 RX ORDER — DOXYCYCLINE HYCLATE 100 MG/1
100 CAPSULE ORAL EVERY 12 HOURS SCHEDULED
Qty: 14 CAPSULE | Refills: 0 | Status: SHIPPED | OUTPATIENT
Start: 2022-10-03 | End: 2022-10-10

## 2022-10-04 DIAGNOSIS — E06.3 HYPOTHYROIDISM DUE TO HASHIMOTO'S THYROIDITIS: ICD-10-CM

## 2022-10-04 DIAGNOSIS — E03.8 HYPOTHYROIDISM DUE TO HASHIMOTO'S THYROIDITIS: ICD-10-CM

## 2022-10-04 RX ORDER — LEVOTHYROXINE SODIUM 88 UG/1
88 TABLET ORAL DAILY
Qty: 30 TABLET | Refills: 1 | Status: SHIPPED | OUTPATIENT
Start: 2022-10-04 | End: 2022-10-14 | Stop reason: SDUPTHER

## 2022-10-14 DIAGNOSIS — E06.3 HYPOTHYROIDISM DUE TO HASHIMOTO'S THYROIDITIS: ICD-10-CM

## 2022-10-14 DIAGNOSIS — E03.8 HYPOTHYROIDISM DUE TO HASHIMOTO'S THYROIDITIS: ICD-10-CM

## 2022-10-14 RX ORDER — LEVOTHYROXINE SODIUM 88 UG/1
88 TABLET ORAL DAILY
Qty: 30 TABLET | Refills: 1 | Status: SHIPPED | OUTPATIENT
Start: 2022-10-14

## 2022-11-01 ENCOUNTER — TELEPHONE (OUTPATIENT)
Dept: CARDIOLOGY CLINIC | Facility: CLINIC | Age: 42
End: 2022-11-01

## 2022-11-01 DIAGNOSIS — I34.0 NON-RHEUMATIC MITRAL REGURGITATION: Primary | ICD-10-CM

## 2022-11-01 NOTE — TELEPHONE ENCOUNTER
Patient wants to know if she is due to have an echocardiogram for mitral valve regurgitation  She denies any cardiac symptoms  She was under the impression she would need to follow up in 2 years   Please advise

## 2022-11-08 DIAGNOSIS — E83.51 HYPOCALCEMIA: Primary | ICD-10-CM

## 2022-11-29 ENCOUNTER — HOSPITAL ENCOUNTER (OUTPATIENT)
Dept: NON INVASIVE DIAGNOSTICS | Facility: MEDICAL CENTER | Age: 42
Discharge: HOME | End: 2022-11-29

## 2022-11-29 VITALS
SYSTOLIC BLOOD PRESSURE: 122 MMHG | HEART RATE: 80 BPM | BODY MASS INDEX: 31.41 KG/M2 | HEIGHT: 64 IN | DIASTOLIC BLOOD PRESSURE: 64 MMHG | WEIGHT: 184 LBS

## 2022-11-29 DIAGNOSIS — I34.0 NON-RHEUMATIC MITRAL REGURGITATION: ICD-10-CM

## 2022-11-29 LAB
AORTIC ROOT: 2.3 CM
APICAL FOUR CHAMBER EJECTION FRACTION: 56 %
E WAVE DECELERATION TIME: 155 MS
FRACTIONAL SHORTENING: 41 % (ref 28–44)
INTERVENTRICULAR SEPTUM IN DIASTOLE (PARASTERNAL SHORT AXIS VIEW): 0.8 CM
INTERVENTRICULAR SEPTUM: 0.8 CM (ref 0.6–1.1)
LAAS-AP2: 20.9 CM2
LAAS-AP4: 18.4 CM2
LEFT ATRIUM SIZE: 3.5 CM
LEFT INTERNAL DIMENSION IN SYSTOLE: 2.9 CM (ref 2.1–4)
LEFT VENTRICULAR INTERNAL DIMENSION IN DIASTOLE: 4.9 CM (ref 3.5–6)
LEFT VENTRICULAR POSTERIOR WALL IN END DIASTOLE: 0.9 CM
LEFT VENTRICULAR STROKE VOLUME: 81 ML
LVSV (TEICH): 81 ML
MV E'TISSUE VEL-LAT: 19 CM/S
MV E'TISSUE VEL-SEP: 14 CM/S
MV EROA: 0.1 CM2
MV PEAK A VEL: 0.96 M/S
MV PEAK E VEL: 100 CM/S
MV STENOSIS PRESSURE HALF TIME: 45 MS
MV VALVE AREA P 1/2 METHOD: 4.89 CM2
PISA MRMAX VEL: 0.32 M/S
PISA RADIUS: 0.6 CM
RIGHT ATRIUM AREA SYSTOLE A4C: 13.5 CM2
RIGHT VENTRICLE ID DIMENSION: 4 CM
SL CV LEFT ATRIUM LENGTH A2C: 5 CM
SL CV LV EF: 55
SL CV PED ECHO LEFT VENTRICLE DIASTOLIC VOLUME (MOD BIPLANE) 2D: 112 ML
SL CV PED ECHO LEFT VENTRICLE SYSTOLIC VOLUME (MOD BIPLANE) 2D: 31 ML
TR MAX PG: 26 MMHG
TR PEAK VELOCITY: 2.5 M/S
TRICUSPID VALVE PEAK REGURGITATION VELOCITY: 2.53 M/S

## 2022-12-19 ENCOUNTER — PATIENT MESSAGE (OUTPATIENT)
Dept: FAMILY MEDICINE CLINIC | Facility: CLINIC | Age: 42
End: 2022-12-19

## 2022-12-19 DIAGNOSIS — E03.8 HYPOTHYROIDISM DUE TO HASHIMOTO'S THYROIDITIS: Primary | ICD-10-CM

## 2022-12-19 DIAGNOSIS — E06.3 HYPOTHYROIDISM DUE TO HASHIMOTO'S THYROIDITIS: Primary | ICD-10-CM

## 2023-01-10 DIAGNOSIS — E06.3 HYPOTHYROIDISM DUE TO HASHIMOTO'S THYROIDITIS: ICD-10-CM

## 2023-01-10 DIAGNOSIS — E03.8 HYPOTHYROIDISM DUE TO HASHIMOTO'S THYROIDITIS: ICD-10-CM

## 2023-01-10 RX ORDER — LEVOTHYROXINE SODIUM 88 UG/1
TABLET ORAL
Qty: 30 TABLET | Refills: 1 | Status: SHIPPED | OUTPATIENT
Start: 2023-01-10

## 2023-03-15 DIAGNOSIS — E06.3 HYPOTHYROIDISM DUE TO HASHIMOTO'S THYROIDITIS: ICD-10-CM

## 2023-03-15 DIAGNOSIS — E03.8 HYPOTHYROIDISM DUE TO HASHIMOTO'S THYROIDITIS: ICD-10-CM

## 2023-03-15 RX ORDER — LEVOTHYROXINE SODIUM 88 UG/1
88 TABLET ORAL DAILY
Qty: 30 TABLET | Refills: 0 | Status: SHIPPED | OUTPATIENT
Start: 2023-03-15

## 2023-03-23 ENCOUNTER — OFFICE VISIT (OUTPATIENT)
Dept: DERMATOLOGY | Facility: CLINIC | Age: 43
End: 2023-03-23

## 2023-03-23 VITALS — WEIGHT: 180.4 LBS | HEIGHT: 64 IN | BODY MASS INDEX: 30.8 KG/M2 | TEMPERATURE: 97.2 F

## 2023-03-23 DIAGNOSIS — D48.5 NEOPLASM OF UNCERTAIN BEHAVIOR OF SKIN: Primary | ICD-10-CM

## 2023-03-23 NOTE — PROGRESS NOTES
[facility] Note     Patient Name: Omero Miller  Encounter Date: 03/23/2023        Have you been cared for by a [facility] Dermatologist in the last 3 years and, if so, which description applies to you? NO  I am considered a "new" patient and must complete all patient intake questions  I am FEMALE/of child-bearing potential     REVIEW OF SYSTEMS:  Have you recently had or currently have any of the following? · Recent fever or chills? No  · Any non-healing wound? No  · Are you pregnant or planning to become pregnant? No  · Are you currently or planning to be nursing or breast feeding? No   PAST MEDICAL HISTORY:  Have you personally ever had or currently have any of the following? If "YES," then please provide more detail  · Skin cancer (such as Melanoma, Basal Cell Carcinoma, Squamous Cell Carcinoma? No  · Tuberculosis, HIV/AIDS, Hepatitis B or C: No  · Systemic Immunosuppression such as Diabetes, Biologic or Immunotherapy, Chemotherapy, Organ Transplantation, Bone Marrow Transplantation YES-WBC Transfusions  · Radiation Treatment No   FAMILY HISTORY:  Any "first degree relatives" (parent, brother, sister, or child) with the following? • Skin Cancer, Pancreatic or Other Cancer? YES, Father- Unknown type skin cancer   PATIENT EXPERIENCE:    • Do you want the Dermatologist to perform a COMPLETE skin exam today including a clinical examination under the "bra and underwear" areas? NO  • If necessary, do we have your permission to call and leave a detailed message on your Preferred Phone number that includes your specific medical information?   Yes      Allergies   Allergen Reactions   • Cortisone Rash and GI Intolerance   • Ferrous Gluconate      Other reaction(s): Constipation   • Ibuprofen Vomiting and Abdominal Pain     Advil   • Other      seasonal   • Montelukast Anxiety   • Penicillins Rash      Current Outpatient Medications:   •  Bioflavonoid Products (IDANIA C PO), Take 1 tablet by mouth daily, Disp: , Rfl:   •  cetirizine (ZyrTEC) 10 mg tablet, Take 10 mg by mouth daily, Disp: , Rfl:   •  Cholecalciferol (VITAMIN D) 2000 units tablet, Take by mouth, Disp: , Rfl:   •  levothyroxine 88 mcg tablet, Take 1 tablet (88 mcg total) by mouth daily, Disp: 30 tablet, Rfl: 0  •  Multiple Vitamin (MULTIVITAMIN) tablet, Take 1 tablet by mouth daily  , Disp: , Rfl:           • Whom besides the patient is providing clinical information about today's encounter?   o NO ADDITIONAL HISTORIAN (patient alone provided history)    Physical Exam and Assessment/Plan by Diagnosis:    NEOPLASM OF UNCERTAIN BEHAVIOR OF SKIN    Physical Exam:  • (Anatomic Location); (Size and Morphological Description); (Differential Diagnosis):  o Left distal nasal dorsum; 3 mm crusted papule; Rule out BCC vs AK vs angioma vs fibrous papule  • Pertinent Positives:  • Pertinent Negatives: Additional History of Present Condition:  Present since June  Patient reports is opens up and bleeds from time to time  Assessment and Plan:  • I have discussed with the patient that a sample of skin via a "skin biopsy” would be potentially helpful to further make a specific diagnosis under the microscope  • Based on a thorough discussion of this condition and the management approach to it (including a comprehensive discussion of the known risks, side effects and potential benefits of treatment), the patient (family) agrees to implement the following specific plan:    o Procedure:  Skin Biopsy  After a thorough discussion of treatment options and risk/benefits/alternatives (including but not limited to local pain, scarring, dyspigmentation, blistering, possible superinfection, and inability to confirm a diagnosis via histopathology), verbal and written consent were obtained and portion of the rash was biopsied for tissue sample  See below for consent that was obtained from patient and subsequent Procedure Note      PROCEDURE TANGENTIAL (SHAVE) BIOPSY NOTE:    • Performing Physician: Donovan Galvan  • Anatomic Location; Clinical Description with size (cm); Pre-Op Diagnosis:   Left distal nasal dorsum; 3 mm crusted papule; Rule out BCC vs AK vs angioma vs fibrous papule  • Post-op diagnosis: Same     • Local anesthesia: 1% Lidocaine HCL     • Topical anesthesia: None    • Hemostasis: Aluminum chloride       After obtaining informed consent  at which time there was a discussion about the purpose of biopsy  and low risks of infection and bleeding  The area was prepped and draped in the usual fashion  Anesthesia was obtained with 1% lidocaine with epinephrine  A shave biopsy to an appropriate sampling depth was obtained by Shave (Dermablade or 15 blade) The resulting wound was covered with surgical ointment and bandaged appropriately  The patient tolerated the procedure well without complications and was without signs of functional compromise  Specimen has been sent for review by Dermatopathology  Standard post-procedure care has been explained and has been included in written form within the patient's copy of Informed Consent  INFORMED CONSENT DISCUSSION AND POST-OPERATIVE INSTRUCTIONS FOR PATIENT    I   RATIONALE FOR PROCEDURE  I understand that a skin biopsy allows the Dermatologist to test a lesion or rash under the microscope to obtain a diagnosis  It usually involves numbing the area with numbing medication and removing a small piece of skin; sometimes the area will be closed with sutures  In this specific procedure, sutures are not usually needed  If any sutures are placed, then they are usually need to be removed in 2 weeks or less  I understand that my Dermatologist recommends that a skin "shave" biopsy be performed today  A local anesthetic, similar to the kind that a dentist uses when filling a cavity, will be injected with a very small needle into the skin area to be sampled    The injected skin and tissue underneath "will go to sleep” and become numb so no pain should be felt afterwards  An instrument shaped like a tiny "razor blade" (shave biopsy instrument) will be used to cut a small piece of tissue and skin from the area so that a sample of tissue can be taken and examined more closely under the microscope  A slight amount of bleeding will occur, but it will be stopped with direct pressure and a pressure bandage and any other appropriate methods  I understands that a scar will form where the wound was created  Surgical ointment will be applied to help protect the wound  Sutures are not usually needed  II   RISKS AND POTENTIAL COMPLICATIONS   I understand the risks and potential complications of a skin biopsy include but are not limited to the following:  • Bleeding  • Infection  • Pain  • Scar/keloid  • Skin discoloration  • Incomplete Removal  • Recurrence  • Nerve Damage/Numbness/Loss of Function  • Allergic Reaction to Anesthesia  • Biopsies are diagnostic procedures and based on findings additional treatment or evaluation may be required  • Loss or destruction of specimen resulting in no additional findings    My Dermatologist has explained to me the nature of the condition, the nature of the procedure, and the benefits to be reasonably expected compared with alternative approaches  My Dermatologist has discussed the likelihood of major risks or complications of this procedure including the specific risks listed above, such as bleeding, infection, and scarring/keloid  I understand that a scar is expected after this procedure  I understand that my physician cannot predict if the scar will form a "keloid," which extends beyond the borders of the wound that is created  A keloid is a thick, painful, and bumpy scar  A keloid can be difficult to treat, as it does not always respond well to therapy, which includes injecting cortisone directly into the keloid every few weeks    While this usually reduces the pain and size of the scar, it does not eliminate it  I understand that photographs may be taken before and after the procedure  These will be maintained as part of the medical providers confidential records and may not be made available to me  I further authorize the medical provider to use the photographs for teaching purposes or to illustrate scientific papers, books, or lectures if in his/her judgment, medical research, education, or science may benefit from its use  I have had an opportunity to fully inquire about the risks and benefits of this procedure and its alternatives  I have been given ample time and opportunity to ask questions and to seek a second opinion if I wished to do so  I acknowledge that there have specifically been no guarantees as to the cosmetic results from the procedure  I am aware that with any procedure there is always the possibility of an unexpected complication  III  POST-PROCEDURAL CARE (WHAT YOU WILL NEED TO DO "AFTER THE BIOPSY" TO OPTIMIZE HEALING)    • Keep the area clean and dry  Try NOT to remove the bandage or get it wet for the first 24 hours  • Gently clean the area and apply surgical ointment (such as Vaseline petrolatum ointment, which is available "over the counter" and not a prescription) to the biopsy site for up to 2 weeks straight  This acts to protect the wound from the outside world  • Sutures are not usually placed in this procedure  If any sutures were placed, return for suture removal as instructed (generally 1 week for the face, 2 weeks for the body)  • Take Acetaminophen (Tylenol) for discomfort, if no contraindications  Ibuprofen or aspirin could make bleeding worse  • Call our office immediately for signs of infection: fever, chills, increased redness, warmth, tenderness, discomfort/pain, or pus or foul smell coming from the wound  WHAT TO DO IF THERE IS ANY BLEEDING?   If a small amount of bleeding is noticed, place a clean cloth over the area and apply firm pressure for ten minutes  Check the wound after 10 minutes of direct pressure  If bleeding persists, try one more time for an additional 10 minutes of direct pressure on the area  If the bleeding becomes heavier or does not stop after the second attempt, or if you have any other questions about this procedure, then please call your [facility] Dermatologist by calling [phone] (SKIN)  I hereby acknowledge that I have reviewed and verified the site with my Dermatologist and have requested and authorized my Dermatologist to proceed with the procedure                Scribe Attestation    I,:  Leroy Gomez am acting as a scribe while in the presence of the attending physician :       I,:  Lara Vazquez MD personally performed the services described in this documentation    as scribed in my presence :

## 2023-03-23 NOTE — PATIENT INSTRUCTIONS
NEOPLASM OF UNCERTAIN BEHAVIOR OF SKIN      Assessment and Plan:  I have discussed with the patient that a sample of skin via a "skin biopsy” would be potentially helpful to further make a specific diagnosis under the microscope  Based on a thorough discussion of this condition and the management approach to it (including a comprehensive discussion of the known risks, side effects and potential benefits of treatment), the patient (family) agrees to implement the following specific plan:    Procedure:  Skin Biopsy  After a thorough discussion of treatment options and risk/benefits/alternatives (including but not limited to local pain, scarring, dyspigmentation, blistering, possible superinfection, and inability to confirm a diagnosis via histopathology), verbal and written consent were obtained and portion of the rash was biopsied for tissue sample  See below for consent that was obtained from patient and subsequent Procedure Note

## 2023-03-31 NOTE — RESULT ENCOUNTER NOTE
Team- please schedule OVS at patient convenience to recheck her nose  Results reviewed showing angioma with likely reactive atypia vs early AK  Will notify patient via MyChart and set up OVS to recheck the site and consider cryotherapy  0 Result Notes  Component   Case Report  Surgical Pathology Report                         Case: [id number]                                   Authorizing Provider: Ne Fraser MD     Collected:           03/23/2023 1540              Ordering Location:     [facility]      Received:            03/23/2023 [address] [place] [address]                                                                       Pathologist:           Ne Fraser MD                                                       Specimen:    Skin, Other, A: Left distal nasal dorsum                                                 Final Diagnosis  A  Skin, Left distal nasal dorsum, Shave biopsy:  Superficial biopsy with changes consistent with an angioma, ulcerated; extending to margins  Focal basilar atypia suggestive of reactive atypia or an actinic keratosis  (See note)     Note: Deeper levels were reviewed  Mild squamous atypia is presented and is interpreted to be most likely reactive in nature secondary trauma and underlying inflammation  The biopsy is superficial, and limited dermis is available for review   If a lesion concerning for malignancy persists or recurs at the site, a deeper biopsy is suggested         Electronically signed by Ne Fraser MD on 3/31/2023 at  9:52 AM   Preliminary result electronically signed by Ne Fraser MD on 3/29/2023 at  9:25 AM  Additional Information   All reported additional testing was performed with appropriately reactive controls   These tests were developed and their performance characteristics determined by [address] [place] [address] Laboratory or appropriate performing facility, though some tests may be performed on "tissues which have not been validated for performance characteristics (such as staining performed on alcohol exposed cell blocks and decalcified tissues)   Results should be interpreted with caution and in the context of the patients' clinical condition  These tests may not be cleared or approved by the [place]  Food and Drug Administration, though the FDA has determined that such clearance or approval is not necessary  These tests are used for clinical purposes and they should not be regarded as investigational or for research  This laboratory has been approved by [facility], designated as a high-complexity laboratory and is qualified to perform these tests  Tad Doyle Description   A  The specimen is received in formalin, labeled with the patient's name and hospital number, and is designated \" left distal nasal dorsum\"  The specimen consists of a shave biopsy of tan roughened skin measuring 0 4 x 0 4 x 0 1 cm  The apparent margin of resection is inked green  The specimen is bisected and entirely submitted between sponges in 1 cassette      Note: The estimated total formalin fixation time based upon information provided by the submitting clinician and the standard processing schedule is under 72 hours    [id number]      "

## 2023-04-04 ENCOUNTER — TELEPHONE (OUTPATIENT)
Dept: DERMATOLOGY | Facility: CLINIC | Age: 43
End: 2023-04-04

## 2023-04-04 NOTE — TELEPHONE ENCOUNTER
Dr Mic Meyer sent message in result note to schedule pt for an ovs of a recheck for her nose at pt convince   Called pt but n/a, left v/m with c/b info

## 2023-05-09 DIAGNOSIS — E03.8 HYPOTHYROIDISM DUE TO HASHIMOTO'S THYROIDITIS: ICD-10-CM

## 2023-05-09 DIAGNOSIS — E06.3 HYPOTHYROIDISM DUE TO HASHIMOTO'S THYROIDITIS: ICD-10-CM

## 2023-05-09 RX ORDER — LEVOTHYROXINE SODIUM 88 UG/1
88 TABLET ORAL DAILY
Qty: 30 TABLET | Refills: 0 | Status: SHIPPED | OUTPATIENT
Start: 2023-05-09

## 2023-06-06 DIAGNOSIS — E03.8 HYPOTHYROIDISM DUE TO HASHIMOTO'S THYROIDITIS: ICD-10-CM

## 2023-06-06 DIAGNOSIS — E06.3 HYPOTHYROIDISM DUE TO HASHIMOTO'S THYROIDITIS: ICD-10-CM

## 2023-06-06 RX ORDER — LEVOTHYROXINE SODIUM 88 UG/1
88 TABLET ORAL DAILY
Qty: 30 TABLET | Refills: 0 | Status: CANCELLED | OUTPATIENT
Start: 2023-06-06

## 2023-06-06 RX ORDER — LEVOTHYROXINE SODIUM 88 UG/1
TABLET ORAL
Qty: 30 TABLET | Refills: 0 | Status: SHIPPED | OUTPATIENT
Start: 2023-06-06 | End: 2023-06-13 | Stop reason: SDUPTHER

## 2023-06-12 ENCOUNTER — HOSPITAL ENCOUNTER (OUTPATIENT)
Dept: MAMMOGRAPHY | Facility: HOSPITAL | Age: 43
Discharge: HOME | End: 2023-06-12
Payer: COMMERCIAL

## 2023-06-12 VITALS — BODY MASS INDEX: 29.96 KG/M2 | HEIGHT: 64 IN | WEIGHT: 175.49 LBS

## 2023-06-12 DIAGNOSIS — Z12.31 ENCOUNTER FOR SCREENING MAMMOGRAM FOR BREAST CANCER: ICD-10-CM

## 2023-06-12 PROCEDURE — 77063 BREAST TOMOSYNTHESIS BI: CPT

## 2023-06-12 PROCEDURE — 77067 SCR MAMMO BI INCL CAD: CPT

## 2023-06-13 ENCOUNTER — OFFICE VISIT (OUTPATIENT)
Dept: FAMILY MEDICINE CLINIC | Facility: CLINIC | Age: 43
End: 2023-06-13
Payer: COMMERCIAL

## 2023-06-13 VITALS
WEIGHT: 180 LBS | OXYGEN SATURATION: 99 % | SYSTOLIC BLOOD PRESSURE: 112 MMHG | DIASTOLIC BLOOD PRESSURE: 80 MMHG | TEMPERATURE: 98 F | HEIGHT: 64 IN | HEART RATE: 70 BPM | BODY MASS INDEX: 30.73 KG/M2

## 2023-06-13 DIAGNOSIS — E03.8 HYPOTHYROIDISM DUE TO HASHIMOTO'S THYROIDITIS: ICD-10-CM

## 2023-06-13 DIAGNOSIS — E06.3 HYPOTHYROIDISM DUE TO HASHIMOTO'S THYROIDITIS: ICD-10-CM

## 2023-06-13 DIAGNOSIS — Z00.00 ANNUAL PHYSICAL EXAM: Primary | ICD-10-CM

## 2023-06-13 PROCEDURE — 99396 PREV VISIT EST AGE 40-64: CPT | Performed by: FAMILY MEDICINE

## 2023-06-13 RX ORDER — LEVOTHYROXINE SODIUM 88 UG/1
88 TABLET ORAL DAILY
Qty: 90 TABLET | Refills: 3 | Status: SHIPPED | OUTPATIENT
Start: 2023-06-13

## 2023-06-13 NOTE — PROGRESS NOTES
ADULT ANNUAL [address]    NAME: Ferdinand Raymond  AGE: 39 y o  SEX: female  : 1980   DATE: 2023     Assessment and Plan:     Problem List Items Addressed This Visit        Endocrine    Hypothyroidism    Relevant Medications    levothyroxine 88 mcg tablet   Other Visit Diagnoses     Annual physical exam    -  Primary    BMI 30 0-30 9,adult            Immunizations and preventive care screenings were discussed with patient today  Appropriate education was printed on patient's after visit summary  Counseling:  Alcohol/drug use: discussed moderation in alcohol intake, the recommendations for healthy alcohol use, and avoidance of illicit drug use  Dental Health: discussed importance of regular tooth brushing, flossing, and dental visits  Exercise: the importance of regular exercise/physical activity was discussed  Recommend exercise 3-5 times per week for at least 30 minutes  BMI Counseling: Body mass index is 30 9 kg/m²  The BMI is above normal  Nutrition recommendations include decreasing portion sizes, encouraging healthy choices of fruits and vegetables and limiting drinks that contain sugar  Exercise recommendations include moderate physical activity 150 minutes/week, exercising 3-5 times per week and strength training exercises  Patient referred to PCP  Rationale for BMI follow-up plan is due to patient being overweight or obese  Depression Screening and Follow-up Plan: Patient was screened for depression during today's encounter  They screened negative with a PHQ-2 score of 0  Return in about 1 year (around 2024) for Annual physical      Chief Complaint:     Chief Complaint   Patient presents with   • Physical Exam      History of Present Illness:     Adult Annual Physical   Patient here for a comprehensive physical exam  The patient reports no problems      Diet and Physical Activity  Diet/Nutrition: well balanced diet  Exercise: walking  Depression Screening  PHQ-2/9 Depression Screening    Little interest or pleasure in doing things: 0 - not at all  Feeling down, depressed, or hopeless: 0 - not at all  PHQ-2 Score: 0  PHQ-2 Interpretation: Negative depression screen       General Health  Sleep: sleeps well  Hearing: normal - bilateral   Vision: most recent eye exam <1 year ago and wears glasses  Dental: regular dental visits  /GYN Health  Patient is: postmenopausal  Last menstrual period: Patient's last menstrual period was 01/18/2014 (exact date)  Review of Systems:     Review of Systems   Constitutional: Negative for activity change, chills and fever  HENT: Negative for congestion, rhinorrhea and sore throat  Eyes: Negative for visual disturbance  Respiratory: Negative for cough, shortness of breath and wheezing  Cardiovascular: Negative for chest pain and palpitations  Gastrointestinal: Negative for abdominal pain, blood in stool, constipation, diarrhea, nausea and vomiting  Genitourinary: Negative for dysuria  Musculoskeletal: Negative for arthralgias and myalgias  Skin: Negative for rash  Neurological: Negative for dizziness, weakness and headaches  All other systems reviewed and are negative  Past Medical History:     Past Medical History:   Diagnosis Date   • Abnormal blood chemistry     last assessed [id number]   • Allergic 1990   • Anemia 1998   • Anxiety 2013   • Arthritis    • Cervical radiculopathy     last assessed [id number]   • Chronic kidney disease 2010   • Coronary artery disease 2006   • Depression 2018    It comes and goes  I think its related to my thyroid     • Disease of thyroid gland    • GERD (gastroesophageal reflux disease) 2017   • Heart murmur 2005   • HL (hearing loss) 1981   • Leukopenia    • Nonrheumatic mitral valve insufficiency 06/2013   • Otitis media 1990   • Perforation of left tympanic membrane 07/30/2020   • Plantar fasciitis of left foot 10/2021   • Pneumonia    • Psychogenic nonepileptic seizure    • Seizures ([address] ) 1984    Stopped when it turned 12   • Varicella 1990   • Viral gastroenteritis     Resolving, [place] diet, Stay hydrated;  last assessed [id number]   • Vitamin B 12 deficiency    • Vitamin D deficiency       Past Surgical History:     Past Surgical History:   Procedure Laterality Date   • HYSTERECTOMY      age 27   • TONSILLECTOMY     • TYMPANOSTOMY TUBE PLACEMENT Bilateral    • WISDOM TOOTH EXTRACTION        Social History:     Social History     Socioeconomic History   • Marital status: /Civil Union     Spouse name: None   • Number of children: 2   • Years of education: 12   • Highest education level: None   Occupational History   • None   Tobacco Use   • Smoking status: Never     Passive exposure: Never   • Smokeless tobacco: Never   Vaping Use   • Vaping Use: Never used   Substance and Sexual Activity   • Alcohol use: No   • Drug use: No   • Sexual activity: Not Currently     Partners: Male     Birth control/protection: None   Other Topics Concern   • None   Social History Narrative    Denied caffeine use     Social Determinants of Health     Financial Resource Strain: Not on file   Food Insecurity: Not on file   Transportation Needs: Not on file   Physical Activity: Insufficiently Active (3/3/2020)    Exercise Vital Sign    • Days of Exercise per Week: 4 days    • Minutes of Exercise per Session: 30 min   Stress: No Stress Concern Present (3/3/2020)    [address]    • Feeling of Stress : Not at all   Social Connections: Unknown (3/3/2020)    Social Connection and Isolation Panel [NHANES]    • Frequency of Communication with Friends and Family: Not on file    • Frequency of Social Gatherings with Friends and Family: Not on file    • Attends Scientology Services: Not on file    • Active Member of Clubs or Organizations: Not on file    • Attends Club or Organization Meetings: Not on file    • Marital Status:    Intimate Partner Violence: Not on file   Housing Stability: Not on file      Family History:     Family History   Problem Relation Age of Onset   • Arthritis Mother    • Anxiety disorder Mother    • Osteoporosis Mother    • Hypertension Mother    • Thyroid disease Mother    • Cancer Mother         Skin cancer   • Other Father         exposed to chemical contaminants-dioxin   • Heart attack Father    • Arthritis Father    • Asthma Father    • Cancer Father         Skin cancer   • COPD Father         Agent orange   • Hearing loss Father    • Breast cancer Maternal Grandmother 72   • Dementia Maternal Grandmother    • Anxiety disorder Maternal Grandmother    • Cancer Maternal Grandmother         Breadt cancer   • Hypertension Maternal Grandmother    • Cancer Maternal Grandfather    • Lung cancer Maternal Grandfather 79   • Stroke Maternal Grandfather    • Thyroid disease Maternal Grandfather    • Heart attack Paternal Grandmother    • Heart failure Paternal Grandfather    • Stroke Paternal Grandfather    • Cancer Paternal Grandfather         Skin cancer and lung cancer and bone cancer   • No Known Problems Son    • Asthma Son    • Learning disabilities Son         I have it to     • No Known Problems Maternal Aunt    • No Known Problems Maternal Aunt    • No Known Problems Maternal Aunt    • Alzheimer's disease Family    • Arthritis Family    • Bone cancer Family    • Breast cancer Family    • Heart disease Family    • Hypertension Family    • Stroke Family    • Thyroid disease Family    • Cancer Family    • Skin cancer Family    • Diabetes Neg Hx    • Colon cancer Neg Hx       Current Medications:     Current Outpatient Medications   Medication Sig Dispense Refill   • Bioflavonoid Products (IDANIA C PO) Take 1 tablet by mouth daily     • cetirizine (ZyrTEC) 10 mg tablet Take 10 mg by mouth daily     • Cholecalciferol (VITAMIN D) 2000 units tablet Take by "mouth     • levothyroxine 88 mcg tablet Take 1 tablet (88 mcg total) by mouth daily 90 tablet 3   • Multiple Vitamin (MULTIVITAMIN) tablet Take 1 tablet by mouth daily  No current facility-administered medications for this visit  Allergies: Allergies   Allergen Reactions   • Cortisone Rash and GI Intolerance   • Ferrous Gluconate      Other reaction(s): Constipation   • Ibuprofen Vomiting and Abdominal Pain     Advil   • Other      seasonal   • Montelukast Anxiety   • Penicillins Rash      Physical Exam:     /80   Pulse 70   Temp 98 °F (36 7 °C)   Ht 5' 4\" (1 626 m)   Wt 81 6 kg (180 lb)   LMP 01/18/2014 (Exact Date)   SpO2 99%   BMI 30 90 kg/m²     Physical Exam  Vitals and nursing note reviewed  Constitutional:       General: She is not in acute distress  Appearance: She is well-developed  She is not ill-appearing  HENT:      Head: Normocephalic and atraumatic  Right Ear: Tympanic membrane, ear canal and external ear normal  No middle ear effusion  Left Ear: Tympanic membrane, ear canal and external ear normal   No middle ear effusion  Nose: Nose normal  No congestion or rhinorrhea  Mouth/Throat:      Lips: Pink  Mouth: Mucous membranes are moist       Pharynx: Oropharynx is clear  Uvula midline  No oropharyngeal exudate  Tonsils: No tonsillar exudate  Eyes:      General: Lids are normal       Extraocular Movements: Extraocular movements intact  Conjunctiva/sclera: Conjunctivae normal       Pupils: Pupils are equal, round, and reactive to light  Neck:      Thyroid: No thyromegaly  Trachea: No tracheal deviation  Cardiovascular:      Rate and Rhythm: Normal rate and regular rhythm  Pulses: Normal pulses  Heart sounds: Normal heart sounds, S1 normal and S2 normal  No murmur heard  Pulmonary:      Effort: Pulmonary effort is normal  No respiratory distress  Breath sounds: Normal breath sounds   No decreased breath sounds, " wheezing, rhonchi or rales  Abdominal:      General: Bowel sounds are normal  There is no distension  Palpations: Abdomen is soft  Tenderness: There is no abdominal tenderness  Musculoskeletal:      Right lower leg: No edema  Left lower leg: No edema  Lymphadenopathy:      Cervical: No cervical adenopathy  Skin:     General: Skin is warm and dry  Capillary Refill: Capillary refill takes less than 2 seconds  Neurological:      Mental Status: She is alert and oriented to person, place, and time  Deep Tendon Reflexes: Reflexes normal       Reflex Scores:       Patellar reflexes are 2+ on the right side and 2+ on the left side  Psychiatric:         Attention and Perception: Attention normal          Mood and Affect: Mood normal          Thought Content: Thought content does not include suicidal ideation           Peggy Mcclain MD  [facility]

## 2023-06-21 ENCOUNTER — OFFICE VISIT (OUTPATIENT)
Dept: UROLOGY | Facility: AMBULATORY SURGERY CENTER | Age: 43
End: 2023-06-21
Payer: COMMERCIAL

## 2023-06-21 VITALS
HEART RATE: 87 BPM | SYSTOLIC BLOOD PRESSURE: 116 MMHG | DIASTOLIC BLOOD PRESSURE: 78 MMHG | OXYGEN SATURATION: 99 % | WEIGHT: 177 LBS | BODY MASS INDEX: 30.38 KG/M2

## 2023-06-21 DIAGNOSIS — N28.1 RENAL CYST: Primary | ICD-10-CM

## 2023-06-21 PROCEDURE — 99214 OFFICE O/P EST MOD 30 MIN: CPT | Performed by: NURSE PRACTITIONER

## 2023-06-21 NOTE — PROGRESS NOTES
6/21/2023    Assessment and Plan    39 y o  female managed by our office    1  Renal cyst  ? Previous MRI abdomen reveals Bosniak 2 score left renal cyst 05/2018  ? Ultrasound of kidney and bladder performed 6/8/2022 reveals a septated cyst in the left upper pole with no change in size or appearance from prior evaluation  ? Ultrasound of kidney and bladder ordered in 1 year  ? Will call with results of ultrasound kidney bladder  If there is no significant change the size reports sensation of cyst can consider follow-up in 2 years        History of Present Illness  Temo George is a 39 y o  female here for follow up evaluation of  complex left upper pole renal cyst with most recent diagnostic imaging performed 11/2020 with no change in size or appearance of cyst   She previously had MRI of the abdomen which revealed a Bosniak 2 cyst 05/2018  [place] She currently denies all lower urinary tract symptoms  She denies suprapubic abdominal pain and flank pain  She denies changes to her general health since her prior office evaluation  Review of Systems   Constitutional: Negative for chills and fever  Respiratory: Negative for cough and shortness of breath  Cardiovascular: Negative for chest pain  Gastrointestinal: Negative for abdominal distention, abdominal pain, blood in stool, nausea and vomiting  Genitourinary: Negative for difficulty urinating, dysuria, enuresis, flank pain, frequency, hematuria and urgency  Skin: Negative for rash  Past Medical History  Past Medical History:   Diagnosis Date   • Abnormal blood chemistry     last assessed [id number]   • Allergic 1990   • Anemia 1998   • Anxiety 2013   • Arthritis    • Cervical radiculopathy     last assessed [id number]   • Chronic kidney disease 2010   • Coronary artery disease 2006   • Depression 2018    It comes and goes  I think its related to my thyroid     • Disease of thyroid gland    • GERD (gastroesophageal reflux disease) 2017   • Heart murmur 2005   • HL (hearing loss) 1981   • Leukopenia    • Nonrheumatic mitral valve insufficiency 06/2013   • Otitis media 1990   • Perforation of left tympanic membrane 07/30/2020   • Plantar fasciitis of left foot 10/2021   • Pneumonia    • Psychogenic nonepileptic seizure    • Seizures ([facility] [address] ) 1984    Stopped when it turned 12   • Varicella 1990   • Viral gastroenteritis     Resolving, [place] diet, Stay hydrated;  last assessed [id number]   • Vitamin B 12 deficiency    • Vitamin D deficiency        Past Social History  Past Surgical History:   Procedure Laterality Date   • HYSTERECTOMY      age 27   • TONSILLECTOMY     • TYMPANOSTOMY TUBE PLACEMENT Bilateral    • WISDOM TOOTH EXTRACTION       Social History     Tobacco Use   Smoking Status Never   • Passive exposure: Never   Smokeless Tobacco Never       Past Family History  Family History   Problem Relation Age of Onset   • Arthritis Mother    • Anxiety disorder Mother    • Osteoporosis Mother    • Hypertension Mother    • Thyroid disease Mother    • Cancer Mother         Skin cancer   • Other Father         exposed to chemical contaminants-dioxin   • Heart attack Father    • Arthritis Father    • Asthma Father    • Cancer Father         Skin cancer   • COPD Father         Agent orange   • Hearing loss Father    • Breast cancer Maternal Grandmother 72   • Dementia Maternal Grandmother    • Anxiety disorder Maternal Grandmother    • Cancer Maternal Grandmother         Breadt cancer   • Hypertension Maternal Grandmother    • Cancer Maternal Grandfather    • Lung cancer Maternal Grandfather 79   • Stroke Maternal Grandfather    • Thyroid disease Maternal Grandfather    • Heart attack Paternal Grandmother    • Heart failure Paternal Grandfather    • Stroke Paternal Grandfather    • Cancer Paternal Grandfather         Skin cancer and lung cancer and bone cancer   • No Known Problems Son    • Asthma Son    • Learning disabilities Son         I have it to     • No Known Problems Maternal Aunt    • No Known Problems Maternal Aunt    • No Known Problems Maternal Aunt    • Alzheimer's disease Family    • Arthritis Family    • Bone cancer Family    • Breast cancer Family    • Heart disease Family    • Hypertension Family    • Stroke Family    • Thyroid disease Family    • Cancer Family    • Skin cancer Family    • Diabetes Neg Hx    • Colon cancer Neg Hx        Past Social history  Social History     Socioeconomic History   • Marital status: /Civil Union     Spouse name: Not on file   • Number of children: 2   • Years of education: 12   • Highest education level: Not on file   Occupational History   • Not on file   Tobacco Use   • Smoking status: Never     Passive exposure: Never   • Smokeless tobacco: Never   Vaping Use   • Vaping Use: Never used   Substance and Sexual Activity   • Alcohol use: No   • Drug use: No   • Sexual activity: Not Currently     Partners: Male     Birth control/protection: None   Other Topics Concern   • Not on file   Social History Narrative    Denied caffeine use     Social Determinants of Health     Financial Resource Strain: Not on file   Food Insecurity: Not on file   Transportation Needs: Not on file   Physical Activity: Insufficiently Active (3/3/2020)    Exercise Vital Sign    • Days of Exercise per Week: 4 days    • Minutes of Exercise per Session: 30 min   Stress: No Stress Concern Present (3/3/2020)    [address]    • Feeling of Stress : Not at all   Social Connections: Unknown (3/3/2020)    Social Connection and Isolation Panel [NHANES]    • Frequency of Communication with Friends and Family: Not on file    • Frequency of Social Gatherings with Friends and Family: Not on file    • Attends Bahai Services: Not on file    • Active Member of Clubs or Organizations: Not on file    • Attends Club or Organization Meetings: Not on file    • Marital Status:    Intimate Partner Violence: Not on file   Housing Stability: Not on file       Current Medications  Current Outpatient Medications   Medication Sig Dispense Refill   • Bioflavonoid Products (IDANIA C PO) Take 1 tablet by mouth daily     • cetirizine (ZyrTEC) 10 mg tablet Take 10 mg by mouth daily     • Cholecalciferol (VITAMIN D) 2000 units tablet Take by mouth     • levothyroxine 88 mcg tablet Take 1 tablet (88 mcg total) by mouth daily 90 tablet 3   • Multiple Vitamin (MULTIVITAMIN) tablet Take 1 tablet by mouth daily  No current facility-administered medications for this visit  Allergies  Allergies   Allergen Reactions   • Cortisone Rash and GI Intolerance   • Ferrous Gluconate      Other reaction(s): Constipation   • Ibuprofen Vomiting and Abdominal Pain     Advil   • Other      seasonal   • Montelukast Anxiety   • Penicillins Rash         The following portions of the patient's history were reviewed and updated as appropriate: allergies, current medications, past medical history, past social history, past surgical history and problem list       Vitals  Vitals:    06/21/23 1045   BP: 116/78   BP Location: Left arm   Patient Position: Sitting   Cuff Size: Large   Pulse: 87   SpO2: 99%   Weight: 80 3 kg (177 lb)           Physical Exam  Physical Exam  Vitals reviewed  Constitutional:       General: She is not in acute distress  Appearance: Normal appearance  Cardiovascular:      Heart sounds: Normal heart sounds  Pulmonary:      Effort: Pulmonary effort is normal  No respiratory distress  Breath sounds: Normal breath sounds  Abdominal:      Tenderness: There is no right CVA tenderness or left CVA tenderness  Musculoskeletal:         General: Normal range of motion  Skin:     General: Skin is warm and dry  Neurological:      General: No focal deficit present  Mental Status: She is alert     Psychiatric:         Mood and Affect: Mood normal          Behavior: Behavior normal  "          Results  No results found for this or any previous visit (from the past 1 hour(s))  ]  No results found for: \"PSA\"  Lab Results   Component Value Date    CALCIUM 9 1 02/06/2022    K 3 8 02/06/2022    CO2 26 02/06/2022     (H) 02/06/2022    BUN 11 02/06/2022    CREATININE 0 77 02/06/2022     Lab Results   Component Value Date    WBC 4 22 (L) 02/06/2022    HGB 12 5 02/06/2022    HCT 39 1 02/06/2022    MCV 93 02/06/2022     02/06/2022           Orders  Orders Placed This Encounter   Procedures   • US kidney and bladder     Standing Status:   Future     Standing Expiration Date:   6/21/2027     Scheduling Instructions:      \"Prep required if being scheduled in conjunction with other studies, refer to those examination's Preps first before scheduling  All patients for US Kidney and Bladder they must drink 24 oz of water 60 minutes before your scheduled appointment time  This test requires you to have a FULL bladder  Please do not urinate before your test             Please bring your physician order, insurance cards, a form of photo ID and a list of your medications with you  Arrive 15 minutes prior to your appointment time in order to register  If you need to have lab work or a urinalysis, please do this AFTER your ultrasound  \"     Order Specific Question:   Reason for Exam:     Answer:   renal cyst     Order Specific Question:   Is the patient pregnant?      Answer:   No       DENNY Zimmer  "

## 2023-07-05 ENCOUNTER — TELEPHONE (OUTPATIENT)
Dept: DERMATOLOGY | Facility: CLINIC | Age: 43
End: 2023-07-05

## 2023-07-05 NOTE — TELEPHONE ENCOUNTER
Pt returning call to schedule f/u for possible cryo and a recheck on nose. Pt on waiting list for apt and wcb.

## 2023-08-08 ENCOUNTER — TELEPHONE (OUTPATIENT)
Dept: DERMATOLOGY | Facility: CLINIC | Age: 43
End: 2023-08-08

## 2023-08-08 NOTE — TELEPHONE ENCOUNTER
Called and left  for pt regarding her appt to with Socorro Moore. Informed pt that we are cancelling her appt due to a power outage at our [facility] location. Informed pt that she was placed on a cancellation list. Asked to call back with any further questions.

## 2023-08-09 ENCOUNTER — ANNUAL EXAM (OUTPATIENT)
Dept: GYNECOLOGY | Facility: CLINIC | Age: 43
End: 2023-08-09
Payer: COMMERCIAL

## 2023-08-09 VITALS
BODY MASS INDEX: 31.24 KG/M2 | SYSTOLIC BLOOD PRESSURE: 122 MMHG | WEIGHT: 183 LBS | DIASTOLIC BLOOD PRESSURE: 80 MMHG | HEIGHT: 64 IN

## 2023-08-09 DIAGNOSIS — Z12.31 SCREENING MAMMOGRAM FOR BREAST CANCER: ICD-10-CM

## 2023-08-09 DIAGNOSIS — Z90.710 H/O: HYSTERECTOMY: ICD-10-CM

## 2023-08-09 DIAGNOSIS — Z12.39 ENCOUNTER FOR SCREENING BREAST EXAMINATION: ICD-10-CM

## 2023-08-09 DIAGNOSIS — Z01.419 ENCOUNTER FOR WELL WOMAN EXAM: Primary | ICD-10-CM

## 2023-08-09 PROCEDURE — S0612 ANNUAL GYNECOLOGICAL EXAMINA: HCPCS | Performed by: PHYSICIAN ASSISTANT

## 2023-08-09 NOTE — PROGRESS NOTES
Assessment/Plan:    No problem-specific Assessment & Plan notes found for this encounter. Diagnoses and all orders for this visit:    Encounter for well woman exam    Encounter for screening breast examination    H/O: hysterectomy    Screening mammogram for breast cancer  -     Mammo screening bilateral w 3d & cad; Future          Subjective:      Patient ID: Leo Royal is a 39 y.o. female. Pt presents for her annual exam today--  She has no complaints  She has no bleeding or pelvic pain--hyster  Bowel and bladder are regular  Colonoscopy--  No breast concerns today  Last mammo--6/23    No pap today. rx mammo  Daily mvi      The following portions of the patient's history were reviewed and updated as appropriate: allergies, current medications, past family history, past medical history, past social history, past surgical history and problem list.    Review of Systems   Constitutional: Negative for chills, fever and unexpected weight change. Gastrointestinal: Negative for abdominal pain, blood in stool, constipation and diarrhea. Genitourinary: Negative. Objective:      /80   Ht 5' 4" (1.626 m)   Wt 83 kg (183 lb)   LMP 01/18/2014 (Exact Date)   BMI 31.41 kg/m²          Physical Exam  Vitals and nursing note reviewed. Constitutional:       Appearance: She is well-developed. HENT:      Head: Normocephalic and atraumatic. Chest:   Breasts:     Right: No inverted nipple, mass, nipple discharge or skin change. Left: No inverted nipple, mass, nipple discharge or skin change. Abdominal:      Palpations: Abdomen is soft. Genitourinary:     Exam position: Supine. Labia:         Right: No rash, tenderness or lesion. Left: No rash, tenderness or lesion. Vagina: Normal.      Cervix: No cervical motion tenderness, discharge or friability. Uterus: Absent. Adnexa:         Right: No mass, tenderness or fullness.           Left: No mass, tenderness or fullness. Musculoskeletal:      Cervical back: Normal range of motion. Lymphadenopathy:      Lower Body: No right inguinal adenopathy. No left inguinal adenopathy.

## 2023-08-10 ENCOUNTER — HOSPITAL ENCOUNTER (OUTPATIENT)
Dept: RADIOLOGY | Age: 43
Discharge: HOME | End: 2023-08-10
Payer: COMMERCIAL

## 2023-08-10 DIAGNOSIS — N28.1 RENAL CYST: ICD-10-CM

## 2023-08-10 PROCEDURE — 76775 US EXAM ABDO BACK WALL LIM: CPT

## 2023-10-19 ENCOUNTER — OFFICE VISIT (OUTPATIENT)
Dept: DERMATOLOGY | Facility: CLINIC | Age: 43
End: 2023-10-19
Payer: COMMERCIAL

## 2023-10-19 VITALS — WEIGHT: 189 LBS | BODY MASS INDEX: 32.44 KG/M2 | TEMPERATURE: 98.6 F

## 2023-10-19 DIAGNOSIS — L90.5 SCAR: Primary | ICD-10-CM

## 2023-10-19 PROCEDURE — 99213 OFFICE O/P EST LOW 20 MIN: CPT | Performed by: DERMATOLOGY

## 2023-10-19 RX ORDER — DOXYCYCLINE HYCLATE 100 MG/1
100 CAPSULE ORAL 2 TIMES DAILY
COMMUNITY
Start: 2023-10-07

## 2023-10-19 NOTE — PROGRESS NOTES
[facility] Dermatology Clinic Note     Patient Name: Eboni Keating  Encounter Date: 10/19/23     Have you been cared for by a [facility] Dermatologist in the last 3 years and, if so, which description applies to you? Yes. I have been here within the last 3 years, and my medical history has NOT changed since that time. I am FEMALE/of child-bearing potential.    REVIEW OF SYSTEMS:  Have you recently had or currently have any of the following? No changes in my recent health. PAST MEDICAL HISTORY:  Have you personally ever had or currently have any of the following? If "YES," then please provide more detail. No changes in my medical history. HISTORY OF IMMUNOSUPPRESSION: Do you have a history of any of the following:  Systemic Immunosuppression such as Diabetes, Biologic or Immunotherapy, Chemotherapy, Organ Transplantation, Bone Marrow Transplantation? No     Answering "YES" requires the addition of the dotphrase "IMMUNOSUPPRESSED" as the first diagnosis of the patient's visit. FAMILY HISTORY:  Any "first degree relatives" (parent, brother, sister, or child) with the following? No changes in my family's known health. PATIENT EXPERIENCE:    Do you want the Dermatologist to perform a COMPLETE skin exam today including a clinical examination under the "bra and underwear" areas? NO  If necessary, do we have your permission to call and leave a detailed message on your Preferred Phone number that includes your specific medical information?   Yes      Allergies   Allergen Reactions    Cortisone Rash and GI Intolerance    Ferrous Gluconate      Other reaction(s): Constipation    Ibuprofen Vomiting and Abdominal Pain     Advil    Other      seasonal    Montelukast Anxiety    Penicillins Rash      Current Outpatient Medications:     Bioflavonoid Products (IDANIA C PO), Take 1 tablet by mouth daily, Disp: , Rfl:     cetirizine (ZyrTEC) 10 mg tablet, Take 10 mg by mouth daily, Disp: , Rfl:     Cholecalciferol (VITAMIN D) 2000 units tablet, Take by mouth, Disp: , Rfl:     levothyroxine 88 mcg tablet, Take 1 tablet (88 mcg total) by mouth daily, Disp: 90 tablet, Rfl: 3    Multiple Vitamin (MULTIVITAMIN) tablet, Take 1 tablet by mouth daily. , Disp: , Rfl:           Whom besides the patient is providing clinical information about today's encounter? NO ADDITIONAL HISTORIAN (patient alone provided history)    Physical Exam and Assessment/Plan by Diagnosis:        Scar  Physical Exam:  Anatomic Location Affected:  left distal nasal dorsum   Morphological Description:  Well healed scar no recurrence       Additional History of Present Condition:  Patient present for recheck bx on the left distal nasal dorsum     Assessment and Plan:  Based on a thorough discussion of this condition and the management approach to it (including a comprehensive discussion of the known risks, side effects and potential benefits of treatment), the patient (family) agrees to implement the following specific plan:    No sign of recurrence, no treatment needed       Surgical Pathology Report                         Case: [id number]                                   Authorizing Provider: Allison Spicer MD     Collected:           03/23/2023 1543              Ordering Location:     [facility]      Received:            03/23/2023 [address] [place] [address]                                                                       Pathologist:           Allison Spicer MD                                                       Specimen:    Skin, Other, A: Left distal nasal dorsum                                                  Final Diagnosis  A. Skin, Left distal nasal dorsum, Shave biopsy:  Superficial biopsy with changes consistent with an angioma, ulcerated; extending to margins. Focal basilar atypia suggestive of reactive atypia or an actinic keratosis. (See note)     Note: Deeper levels were reviewed.  Mild squamous atypia is presented and is interpreted to be most likely reactive in nature secondary trauma and underlying inflammation. The biopsy is superficial, and limited dermis is available for review. If a lesion concerning for malignancy persists or recurs at the site, a deeper biopsy is suggested.    Scribe Attestation      I,:  Shanti Villanueva am acting as a scribe while in the presence of the attending physician.:       I,:  Kena Thorne MD personally performed the services described in this documentation    as scribed in my presence.:

## 2023-10-19 NOTE — PATIENT INSTRUCTIONS
Assessment and Plan:  Based on a thorough discussion of this condition and the management approach to it (including a comprehensive discussion of the known risks, side effects and potential benefits of treatment), the patient (family) agrees to implement the following specific plan:    No sign of recurrence, no treatment needed

## 2023-11-24 ENCOUNTER — PATIENT MESSAGE (OUTPATIENT)
Dept: FAMILY MEDICINE CLINIC | Facility: CLINIC | Age: 43
End: 2023-11-24
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
## 2023-11-24 RX ORDER — DOXYCYCLINE HYCLATE 100 MG/1
100 CAPSULE ORAL 2 TIMES DAILY
Qty: 14 CAPSULE | Refills: 0 | Status: SHIPPED | OUTPATIENT
Start: 2023-11-24 | End: 2023-12-01

## 2023-12-05 ENCOUNTER — OFFICE VISIT (OUTPATIENT)
Dept: URGENT CARE | Facility: MEDICAL CENTER | Age: 43
End: 2023-12-05
Payer: COMMERCIAL

## 2023-12-05 VITALS
WEIGHT: 182 LBS | SYSTOLIC BLOOD PRESSURE: 134 MMHG | TEMPERATURE: 98.2 F | HEIGHT: 64 IN | BODY MASS INDEX: 31.07 KG/M2 | OXYGEN SATURATION: 99 % | RESPIRATION RATE: 18 BRPM | HEART RATE: 69 BPM | DIASTOLIC BLOOD PRESSURE: 86 MMHG

## 2023-12-05 DIAGNOSIS — J01.40 ACUTE PANSINUSITIS, RECURRENCE NOT SPECIFIED: Primary | ICD-10-CM

## 2023-12-05 PROCEDURE — 99213 OFFICE O/P EST LOW 20 MIN: CPT

## 2023-12-05 RX ORDER — BENZONATATE 200 MG/1
200 CAPSULE ORAL 3 TIMES DAILY PRN
Qty: 30 CAPSULE | Refills: 0 | Status: SHIPPED | OUTPATIENT
Start: 2023-12-05

## 2023-12-05 RX ORDER — DOXYCYCLINE HYCLATE 100 MG/1
100 CAPSULE ORAL EVERY 12 HOURS SCHEDULED
Qty: 14 CAPSULE | Refills: 0 | Status: SHIPPED | OUTPATIENT
Start: 2023-12-05 | End: 2023-12-12

## 2023-12-05 NOTE — LETTER
December 5, 2023     Patient: Maricarmen Mchugh   YOB: 1980   Date of Visit: 12/5/2023       To Whom it May Concern:    Archie Young was seen in my clinic on 12/5/2023. She may return to work on 12/7/2023 or when fever free for 24 hours without any fever reducing agents. If you have any questions or concerns, please don't hesitate to call.          Sincerely,          DENNY Yancey        CC: No Recipients

## 2023-12-05 NOTE — PROGRESS NOTES
[facility]        NAME: Gareht Morales is a 39 y.o. female  : 1980    MRN: [id number]  DATE: 2023  TIME: 5:32 PM    Assessment and Plan   Acute pansinusitis, recurrence not specified [J01.40]  1. Acute pansinusitis, recurrence not specified  benzonatate (TESSALON) 200 MG capsule    doxycycline hyclate (VIBRAMYCIN) 100 mg capsule            Patient Instructions   Reassurance provided that Gareth Morales lungs are clear on examination today. Majority of cases are viral and will not require antibiotic treatment. Given length of symptoms will treat. Take antibiotics as prescribed. Complete entire course of antibiotics even if feeling better. Eat yogurt with live and active cultures and/or take a probiotic at least 3 hours before or after antibiotic dose. Monitor stool for diarrhea and/or blood. If this occurs, contact primary care doctor ASAP. Take over the counter Mucinex during the day  Take Tessalon as prescribed    Recommend the following options: room humidifier, vicks vapo rub, hot/steamy shower (practice proper safety precautions when handing hot liquids/steam)  Offer fluids frequently to help with hydration, as staying hydrated helps loosen up thick mucous. May drink warm water with honey. May use lemon. Warm compresses over sinuses  Over the counter saline nasal spray    Tylenol/Ibuprofen for pain/fever. Encourage coughing into the elbow instead of the hand. Washing hands frequently with warm water and soap may help stop spread of infection. If symptoms do not improve, please follow with PCP in 3-5 days for further evaluation. Proceed to ER if you become acutely short of breath, you have any difficulty breathing, you develop chest pain or any worsening symptoms. Follow up with PCP in 3-5 days. Proceed to ER if symptoms worsen.     Chief Complaint     Chief Complaint   Patient presents with   • Cold Like Symptoms     Pt reports right ear pain, sinus pain, pnd, dry cough x 4 days. Pt negative for covid at home today and yesterday. History of Present Illness       Earache   There is pain in the right ear. This is a recurrent problem. The current episode started yesterday. The problem occurs constantly. The problem has been waxing and waning. The maximum temperature recorded prior to her arrival was 100.4 - 100.9 F. The fever has been present for 1 to 2 days. The pain is at a severity of 4/10. Associated symptoms include coughing (dry) and rhinorrhea. Pertinent negatives include no abdominal pain, diarrhea, ear discharge, headaches, hearing loss, sore throat or vomiting. Sinusitis  This is a new problem. The current episode started in the past 7 days. Associated symptoms include coughing (dry), ear pain and sinus pressure. Pertinent negatives include no chills, diaphoresis, headaches, shortness of breath or sore throat. Review of Systems   Review of Systems   Constitutional:  Positive for fatigue and fever. Negative for chills and diaphoresis. HENT:  Positive for ear pain, postnasal drip, rhinorrhea, sinus pressure and sinus pain. Negative for ear discharge, hearing loss and sore throat. Respiratory:  Positive for cough (dry). Negative for shortness of breath and wheezing. Cardiovascular: Negative. Negative for chest pain and palpitations. Gastrointestinal:  Negative for abdominal pain, constipation, diarrhea, nausea and vomiting. Musculoskeletal:  Negative for myalgias. Skin: Negative. Negative for color change and wound. Neurological:  Negative for dizziness, light-headedness and headaches.      Current Medications       Current Outpatient Medications:   •  benzonatate (TESSALON) 200 MG capsule, Take 1 capsule (200 mg total) by mouth 3 (three) times a day as needed for cough, Disp: 30 capsule, Rfl: 0  •  Bioflavonoid Products (IDANIA C PO), Take 1 tablet by mouth daily, Disp: , Rfl:   •  cetirizine (ZyrTEC) 10 mg tablet, Take 10 mg by mouth daily, Disp: , Rfl:   •  Cholecalciferol (VITAMIN D) 2000 units tablet, Take by mouth, Disp: , Rfl:   •  doxycycline hyclate (VIBRAMYCIN) 100 mg capsule, Take 1 capsule (100 mg total) by mouth every 12 (twelve) hours for 7 days, Disp: 14 capsule, Rfl: 0  •  levothyroxine 88 mcg tablet, Take 1 tablet (88 mcg total) by mouth daily, Disp: 90 tablet, Rfl: 3  •  Multiple Vitamin (MULTIVITAMIN) tablet, Take 1 tablet by mouth daily. , Disp: , Rfl:     Current Allergies     Allergies as of 12/05/2023 - Reviewed 12/05/2023   Allergen Reaction Noted   • Cortisone Rash and GI Intolerance 03/06/2018   • Ferrous gluconate  01/30/2014   • Ibuprofen Vomiting and Abdominal Pain 02/09/2016   • Other  01/30/2014   • Montelukast Anxiety    • Penicillins Rash 07/05/2012            The following portions of the patient's history were reviewed and updated as appropriate: allergies, current medications, past family history, past medical history, past social history, past surgical history and problem list.     Past Medical History:   Diagnosis Date   • Abnormal blood chemistry     last assessed [id number]   • Allergic 1990   • Anemia 1998   • Anxiety 2013   • Arthritis    • Cervical radiculopathy     last assessed [id number]   • Chronic kidney disease 2010   • Coronary artery disease 2006   • Depression 2018    It comes and goes. I think its related to my thyroid.    • Disease of thyroid gland    • GERD (gastroesophageal reflux disease) 2017   • Heart murmur 2005   • HL (hearing loss) 1981   • Leukopenia    • Nonrheumatic mitral valve insufficiency 06/2013   • Otitis media 1990   • Perforation of left tympanic membrane 07/30/2020   • Plantar fasciitis of left foot 10/2021   • Pneumonia    • Psychogenic nonepileptic seizure    • Seizures ([address]) 1984    Stopped when it turned 12   • Varicella 1990   • Viral gastroenteritis     Resolving, [place] diet, Stay hydrated;  last assessed [id number]   • Vitamin B 12 deficiency    • Vitamin D deficiency        Past Surgical History:   Procedure Laterality Date   • HYSTERECTOMY      age 27   • TONSILLECTOMY     • TYMPANOSTOMY TUBE PLACEMENT Bilateral    • WISDOM TOOTH EXTRACTION         Family History   Problem Relation Age of Onset   • Arthritis Mother    • Anxiety disorder Mother    • Osteoporosis Mother    • Hypertension Mother    • Thyroid disease Mother    • Cancer Mother         Skin cancer   • Other Father         exposed to chemical contaminants-dioxin   • Heart attack Father    • Arthritis Father    • Asthma Father    • Cancer Father         Skin cancer   • COPD Father         Agent orange   • Hearing loss Father    • Breast cancer Maternal Grandmother 72   • Dementia Maternal Grandmother    • Anxiety disorder Maternal Grandmother    • Cancer Maternal Grandmother         Breadt cancer   • Hypertension Maternal Grandmother    • Cancer Maternal Grandfather    • Lung cancer Maternal Grandfather 79   • Stroke Maternal Grandfather    • Thyroid disease Maternal Grandfather    • Heart attack Paternal Grandmother    • Heart failure Paternal Grandfather    • Stroke Paternal Grandfather    • Cancer Paternal Grandfather         Skin cancer and lung cancer and bone cancer   • No Known Problems Son    • Asthma Son    • Learning disabilities Son         I have it to. • No Known Problems Maternal Aunt    • No Known Problems Maternal Aunt    • No Known Problems Maternal Aunt    • Alzheimer's disease Family    • Arthritis Family    • Bone cancer Family    • Breast cancer Family    • Heart disease Family    • Hypertension Family    • Stroke Family    • Thyroid disease Family    • Cancer Family    • Skin cancer Family    • Diabetes Neg Hx    • Colon cancer Neg Hx          Medications have been verified.         Objective   /86   Pulse 69   Temp 98.2 °F (36.8 °C) (Temporal)   Resp 18   Ht 5' 4" (1.626 m)   Wt 82.6 kg (182 lb)   LMP 01/18/2014 (Exact Date)   SpO2 99%   BMI 31.24 kg/m²        Physical Exam     Physical Exam  Vitals and nursing note reviewed. Constitutional:       General: She is not in acute distress. Appearance: Normal appearance. She is not ill-appearing, toxic-appearing or diaphoretic. HENT:      Head: Normocephalic. Right Ear: Ear canal and external ear normal. A middle ear effusion (clear fluid bubbles) is present. There is no impacted cerumen. Left Ear: Tympanic membrane, ear canal and external ear normal. There is no impacted cerumen. Nose: No congestion or rhinorrhea. Right Sinus: Maxillary sinus tenderness and frontal sinus tenderness present. Left Sinus: Maxillary sinus tenderness and frontal sinus tenderness present. Comments: R > L     Mouth/Throat:      Mouth: Mucous membranes are moist.      Pharynx: No oropharyngeal exudate or posterior oropharyngeal erythema. Cardiovascular:      Rate and Rhythm: Normal rate and regular rhythm. Pulses: Normal pulses. Heart sounds: Normal heart sounds. No murmur heard. Pulmonary:      Effort: Pulmonary effort is normal. No respiratory distress. Breath sounds: Normal breath sounds. No stridor. No wheezing, rhonchi or rales. Chest:      Chest wall: No tenderness. Musculoskeletal:         General: Normal range of motion. Skin:     General: Skin is warm. Neurological:      Mental Status: She is alert.

## 2023-12-05 NOTE — PATIENT INSTRUCTIONS
Reassurance provided that Bert Israel lungs are clear on examination today. Majority of cases are viral and will not require antibiotic treatment. Given length of symptoms will treat. Take antibiotics as prescribed. Complete entire course of antibiotics even if feeling better. Eat yogurt with live and active cultures and/or take a probiotic at least 3 hours before or after antibiotic dose. Monitor stool for diarrhea and/or blood. If this occurs, contact primary care doctor ASAP. Take over the counter Mucinex during the day  Take Tessalon as prescribed    Recommend the following options: room humidifier, vicks vapo rub, hot/steamy shower (practice proper safety precautions when handing hot liquids/steam)  Offer fluids frequently to help with hydration, as staying hydrated helps loosen up thick mucous. May drink warm water with honey. May use lemon. Warm compresses over sinuses  Over the counter saline nasal spray    Tylenol/Ibuprofen for pain/fever. Encourage coughing into the elbow instead of the hand. Washing hands frequently with warm water and soap may help stop spread of infection. If symptoms do not improve, please follow with PCP in 3-5 days for further evaluation. Proceed to ER if you become acutely short of breath, you have any difficulty breathing, you develop chest pain or any worsening symptoms. Follow up with PCP in 3-5 days. Proceed to ER if symptoms worsen.

## 2024-01-31 ENCOUNTER — PATIENT MESSAGE (OUTPATIENT)
Dept: FAMILY MEDICINE CLINIC | Facility: CLINIC | Age: 44
End: 2024-01-31

## 2024-01-31 DIAGNOSIS — E61.1 IRON DEFICIENCY: ICD-10-CM

## 2024-01-31 DIAGNOSIS — E03.8 HYPOTHYROIDISM DUE TO HASHIMOTO'S THYROIDITIS: Primary | ICD-10-CM

## 2024-01-31 DIAGNOSIS — E06.3 HYPOTHYROIDISM DUE TO HASHIMOTO'S THYROIDITIS: Primary | ICD-10-CM

## 2024-05-09 ENCOUNTER — VBI (OUTPATIENT)
Dept: ADMINISTRATIVE | Facility: OTHER | Age: 44
End: 2024-05-09

## 2024-06-17 ENCOUNTER — TELEPHONE (OUTPATIENT)
Dept: DERMATOLOGY | Facility: CLINIC | Age: 44
End: 2024-06-17

## 2024-06-17 NOTE — TELEPHONE ENCOUNTER
Skin exam no history// R/S from 7/25, LVM advising new date, and time of appt and to callback to confirm or R/S.... JF

## 2024-06-25 ENCOUNTER — RA CDI HCC (OUTPATIENT)
Dept: OTHER | Facility: HOSPITAL | Age: 44
End: 2024-06-25

## 2024-06-29 DIAGNOSIS — E06.3 HYPOTHYROIDISM DUE TO HASHIMOTO'S THYROIDITIS: Primary | ICD-10-CM

## 2024-06-29 DIAGNOSIS — E03.8 HYPOTHYROIDISM DUE TO HASHIMOTO'S THYROIDITIS: Primary | ICD-10-CM

## 2024-06-29 LAB
ALBUMIN SERPL-MCNC: 4.2 G/DL (ref 3.5–5.7)
ALP SERPL-CCNC: 58 U/L (ref 35–120)
ALT SERPL-CCNC: 11 U/L
ANION GAP SERPL CALCULATED.3IONS-SCNC: 8 MMOL/L (ref 3–11)
AST SERPL-CCNC: 12 U/L
BILIRUB SERPL-MCNC: 0.6 MG/DL (ref 0.2–1)
BUN SERPL-MCNC: 11 MG/DL (ref 7–25)
CALCIUM SERPL-MCNC: 8.7 MG/DL (ref 8.5–10.1)
CHLORIDE SERPL-SCNC: 107 MMOL/L (ref 100–109)
CO2 SERPL-SCNC: 25 MMOL/L (ref 21–31)
CREAT SERPL-MCNC: 0.72 MG/DL (ref 0.4–1.1)
CYTOLOGY CMNT CVX/VAG CYTO-IMP: NORMAL
ERYTHROCYTE [DISTWIDTH] IN BLOOD BY AUTOMATED COUNT: 13.4 % (ref 12–16)
GFR/BSA.PRED SERPLBLD CYS-BASED-ARV: 105 ML/MIN/{1.73_M2}
GLUCOSE SERPL-MCNC: 91 MG/DL (ref 65–99)
HCT VFR BLD AUTO: 37.5 % (ref 35–43)
HGB BLD-MCNC: 12.7 G/DL (ref 11.5–14.5)
MCH RBC QN AUTO: 30.3 PG (ref 26–34)
MCHC RBC AUTO-ENTMCNC: 33.7 G/DL (ref 32–37)
MCV RBC AUTO: 90 FL (ref 80–100)
PLATELET # BLD AUTO: 188 THOU/CMM (ref 140–350)
PMV BLD REES-ECKER: 11 FL (ref 7.5–11.3)
POTASSIUM SERPL-SCNC: 4.3 MMOL/L (ref 3.5–5.2)
PROT SERPL-MCNC: 6.5 G/DL (ref 6.3–8.3)
RBC # BLD AUTO: 4.17 MILL/CMM (ref 3.7–4.7)
SODIUM SERPL-SCNC: 140 MMOL/L (ref 135–145)
TSH SERPL-ACNC: 0.39 UIU/ML (ref 0.45–5.33)
WBC # BLD AUTO: 4.2 THOU/CMM (ref 4–10)

## 2024-07-01 LAB
T3FREE SERPL-MCNC: 3.21 PG/ML (ref 2.5–3.9)
T4 FREE SERPL-MCNC: 0.88 NG/DL (ref 0.61–1.12)

## 2024-07-02 ENCOUNTER — OFFICE VISIT (OUTPATIENT)
Dept: FAMILY MEDICINE CLINIC | Facility: CLINIC | Age: 44
End: 2024-07-02
Payer: COMMERCIAL

## 2024-07-02 VITALS
WEIGHT: 190.5 LBS | HEIGHT: 64 IN | OXYGEN SATURATION: 99 % | SYSTOLIC BLOOD PRESSURE: 116 MMHG | TEMPERATURE: 97.7 F | DIASTOLIC BLOOD PRESSURE: 70 MMHG | HEART RATE: 74 BPM | BODY MASS INDEX: 32.52 KG/M2

## 2024-07-02 DIAGNOSIS — M54.2 NECK PAIN: ICD-10-CM

## 2024-07-02 DIAGNOSIS — Z12.11 COLON CANCER SCREENING: ICD-10-CM

## 2024-07-02 DIAGNOSIS — E03.8 HYPOTHYROIDISM DUE TO HASHIMOTO'S THYROIDITIS: ICD-10-CM

## 2024-07-02 DIAGNOSIS — Z00.00 ANNUAL PHYSICAL EXAM: Primary | ICD-10-CM

## 2024-07-02 DIAGNOSIS — E06.3 HYPOTHYROIDISM DUE TO HASHIMOTO'S THYROIDITIS: ICD-10-CM

## 2024-07-02 PROBLEM — Z90.710 H/O: HYSTERECTOMY: Status: RESOLVED | Noted: 2022-08-09 | Resolved: 2024-07-02

## 2024-07-02 PROBLEM — H72.93 PERFORATION OF BOTH TYMPANIC MEMBRANES: Status: RESOLVED | Noted: 2022-09-22 | Resolved: 2024-07-02

## 2024-07-02 PROBLEM — H72.92 PERFORATION OF LEFT TYMPANIC MEMBRANE: Status: RESOLVED | Noted: 2020-07-30 | Resolved: 2024-07-02

## 2024-07-02 PROBLEM — G57.72 COMPLEX REGIONAL PAIN SYNDROME TYPE 2 OF LEFT LOWER EXTREMITY: Status: RESOLVED | Noted: 2018-06-13 | Resolved: 2024-07-02

## 2024-07-02 PROBLEM — F41.9 ANXIETY: Status: RESOLVED | Noted: 2017-03-05 | Resolved: 2024-07-02

## 2024-07-02 PROCEDURE — 99396 PREV VISIT EST AGE 40-64: CPT | Performed by: FAMILY MEDICINE

## 2024-07-02 NOTE — PROGRESS NOTES
Adult Annual Physical  Name: Kendal Garduno      : 1980      MRN: [id number]  Encounter Provider: Akil Weaver MD  Encounter Date: 2024   Encounter department: [facility]    Assessment & Plan   1. Annual physical exam  2. Neck pain  -     Ambulatory Referral to Physical Therapy; Future  3. Colon cancer screening  -     Cologuard  4. Hypothyroidism due to Hashimoto's thyroiditis  Assessment & Plan:  Chronic, stable  Continue levothyroxine 88mcg qd  5. BMI 32.0-32.9,adult    We discussed her weight has fluctuated between 180-190lbs over the last several years. She has excellent lifestyle. Monitor for now and she can consider nutritionist/WM specialist next year if still desires weight loss    Immunizations and preventive care screenings were discussed with patient today. Appropriate education was printed on patient's after visit summary.    Counseling:  Dental Health: discussed importance of regular tooth brushing, flossing, and dental visits.  Exercise: the importance of regular exercise/physical activity was discussed. Recommend exercise 3-5 times per week for at least 30 minutes.       Depression Screening and Follow-up Plan: Patient was screened for depression during today's encounter. They screened negative with a PHQ-2 score of 0.        History of Present Illness     Adult Annual Physical:  Patient presents for annual physical. Left neck pain for a few weeks. No injury. No weakness/numbness, tingling. Pain only in left neck, trap. She uses stretches and ice with some improvement. .     Diet and Physical Activity:  - Diet/Nutrition: well balanced diet and consuming 3-5 servings of fruits/vegetables daily.  - Exercise: walking and moderate cardiovascular exercise. exercise bike daily 30 min    Depression Screening:  - PHQ-2 Score: 0    General Health:  - Sleep: sleeps well.  - Hearing: normal hearing bilateral ears.  - Vision: wears glasses and most recent eye  "exam > 1 year ago. every 2 years  - Dental: regular dental visits.    Review of Systems   Constitutional:  Negative for activity change, chills and fever.   HENT:  Negative for congestion, rhinorrhea and sore throat.    Eyes:  Negative for visual disturbance.   Respiratory:  Negative for cough, shortness of breath and wheezing.    Cardiovascular:  Negative for chest pain and palpitations.   Gastrointestinal:  Negative for abdominal pain, blood in stool, constipation, diarrhea, nausea and vomiting.   Genitourinary:  Negative for dysuria.   Musculoskeletal:  Negative for arthralgias and myalgias.   Skin:  Negative for rash.   Neurological:  Negative for dizziness, weakness and headaches.   All other systems reviewed and are negative.    Medical History Reviewed by provider this encounter:  Tobacco  Allergies  Meds  Problems  Med Hx  Surg Hx  Fam Hx         Objective     /70   Pulse 74   Temp 97.7 °F (36.5 °C)   Ht 5' 4\" (1.626 m)   Wt 86.4 kg (190 lb 8 oz)   LMP 01/18/2014 (Exact Date)   SpO2 99%   BMI 32.70 kg/m²     Physical Exam  Vitals and nursing note reviewed.   Constitutional:       General: She is not in acute distress.     Appearance: She is well-developed. She is not ill-appearing.   HENT:      Head: Normocephalic and atraumatic.      Right Ear: Tympanic membrane, ear canal and external ear normal. No middle ear effusion.      Left Ear: Tympanic membrane, ear canal and external ear normal.  No middle ear effusion.      Nose: Nose normal. No congestion or rhinorrhea.      Mouth/Throat:      Lips: Pink.      Mouth: Mucous membranes are moist.      Pharynx: Oropharynx is clear. Uvula midline. No oropharyngeal exudate.      Tonsils: No tonsillar exudate.   Eyes:      General: Lids are normal.      Extraocular Movements: Extraocular movements intact.      Conjunctiva/sclera: Conjunctivae normal.      Pupils: Pupils are equal, round, and reactive to light.   Neck:      Thyroid: No thyromegaly. "      Trachea: No tracheal deviation.   Cardiovascular:      Rate and Rhythm: Normal rate and regular rhythm.      Pulses: Normal pulses.      Heart sounds: Normal heart sounds, S1 normal and S2 normal. No murmur heard.  Pulmonary:      Effort: Pulmonary effort is normal. No respiratory distress.      Breath sounds: Normal breath sounds. No decreased breath sounds, wheezing, rhonchi or rales.   Abdominal:      General: Bowel sounds are normal. There is no distension.      Palpations: Abdomen is soft.      Tenderness: There is no abdominal tenderness.   Musculoskeletal:      Right lower leg: No edema.      Left lower leg: No edema.   Lymphadenopathy:      Cervical: No cervical adenopathy.   Skin:     General: Skin is warm and dry.      Capillary Refill: Capillary refill takes less than 2 seconds.   Neurological:      Mental Status: She is alert and oriented to person, place, and time.      Deep Tendon Reflexes: Reflexes normal.      Reflex Scores:       Patellar reflexes are 2+ on the right side and 2+ on the left side.  Psychiatric:         Attention and Perception: Attention normal.         Mood and Affect: Mood normal.         Thought Content: Thought content does not include suicidal ideation.       Administrative Statements

## 2024-07-02 NOTE — PATIENT INSTRUCTIONS
"Patient Education     Routine physical for adults   The Basics   Written by the doctors and editors at [facility]   What is a physical? -- A physical is a routine visit, or \"check-up,\" with your doctor. You might also hear it called a \"wellness visit\" or \"preventive visit.\"  During each visit, the doctor will:   Ask about your physical and mental health   Ask about your habits, behaviors, and lifestyle   Do an exam   Give you vaccines if needed   Talk to you about any medicines you take   Give advice about your health   Answer your questions  Getting regular check-ups is an important part of taking care of your health. It can help your doctor find and treat any problems you have. But it's also important for preventing health problems.  A routine physical is different from a \"sick visit.\" A sick visit is when you see a doctor because of a health concern or problem. Since physicals are scheduled ahead of time, you can think about what you want to ask the doctor.  How often should I get a physical? -- It depends on your age and health. In general, for people age 21 years and older:   If you are younger than 50 years, you might be able to get a physical every 3 years.   If you are 50 years or older, your doctor might recommend a physical every year.  If you have an ongoing health condition, like diabetes or high blood pressure, your doctor will probably want to see you more often.  What happens during a physical? -- In general, each visit will include:   Physical exam - The doctor or nurse will check your height, weight, heart rate, and blood pressure. They will also look at your eyes and ears. They will ask about how you are feeling and whether you have any symptoms that bother you.   Medicines - It's a good idea to bring a list of all the medicines you take to each doctor visit. Your doctor will talk to you about your medicines and answer any questions. Tell them if you are having any side effects that bother you. You " "should also tell them if you are having trouble paying for any of your medicines.   Habits and behaviors - This includes:   Your diet   Your exercise habits   Whether you smoke, drink alcohol, or use drugs   Whether you are sexually active   Whether you feel safe at home  Your doctor will talk to you about things you can do to improve your health and lower your risk of health problems. They will also offer help and support. For example, if you want to quit smoking, they can give you advice and might prescribe medicines. If you want to improve your diet or get more physical activity, they can help you with this, too.   Lab tests, if needed - The tests you get will depend on your age and situation. For example, your doctor might want to check your:   Cholesterol   Blood sugar   Iron level   Vaccines - The recommended vaccines will depend on your age, health, and what vaccines you already had. Vaccines are very important because they can prevent certain serious or deadly infections.   Discussion of screening - \"Screening\" means checking for diseases or other health problems before they cause symptoms. Your doctor can recommend screening based on your age, risk, and preferences. This might include tests to check for:   Cancer, such as breast, prostate, cervical, ovarian, colorectal, prostate, lung, or skin cancer   Sexually transmitted infections, such as chlamydia and gonorrhea   Mental health conditions like depression and anxiety  Your doctor will talk to you about the different types of screening tests. They can help you decide which screenings to have. They can also explain what the results might mean.   Answering questions - The physical is a good time to ask the doctor or nurse questions about your health. If needed, they can refer you to other doctors or specialists, too.  Adults older than 65 years often need other care, too. As you get older, your doctor will talk to you about:   How to prevent falling at " home   Hearing or vision tests   Memory testing   How to take your medicines safely   Making sure that you have the help and support you need at home  All topics are updated as new evidence becomes available and our peer review process is complete.  This topic retrieved from [employer] on: May 02, 2024.  Topic 413326 Version 1.0  Release: 32.4.3 - C32.122  © 2024 UpToDate, Inc. and/or its affiliates. All rights reserved.  Consumer Information Use and Disclaimer   Disclaimer: This generalized information is a limited summary of diagnosis, treatment, and/or medication information. It is not meant to be comprehensive and should be used as a tool to help the user understand and/or assess potential diagnostic and treatment options. It does NOT include all information about conditions, treatments, medications, side effects, or risks that may apply to a specific patient. It is not intended to be medical advice or a substitute for the medical advice, diagnosis, or treatment of a health care provider based on the health care provider's examination and assessment of a patient's specific and unique circumstances. Patients must speak with a health care provider for complete information about their health, medical questions, and treatment options, including any risks or benefits regarding use of medications. This information does not endorse any treatments or medications as safe, effective, or approved for treating a specific patient. UpToDate, Inc. and its affiliates disclaim any warranty or liability relating to this information or the use thereof.The use of this information is governed by the Terms of Use, available at https://www.woltersWhistleuwer.com/en/know/clinical-effectiveness-terms. 2024© UpToDate, Inc. and its affiliates and/or licensors. All rights reserved.  Copyright   © 2024 UpToDate, Inc. and/or its affiliates. All rights reserved.

## 2024-07-05 DIAGNOSIS — E06.3 HYPOTHYROIDISM DUE TO HASHIMOTO'S THYROIDITIS: ICD-10-CM

## 2024-07-05 DIAGNOSIS — E03.8 HYPOTHYROIDISM DUE TO HASHIMOTO'S THYROIDITIS: ICD-10-CM

## 2024-07-05 RX ORDER — LEVOTHYROXINE SODIUM 88 UG/1
88 TABLET ORAL DAILY
Qty: 90 TABLET | Refills: 1 | Status: SHIPPED | OUTPATIENT
Start: 2024-07-05

## 2024-07-10 ENCOUNTER — EVALUATION (OUTPATIENT)
Dept: PHYSICAL THERAPY | Facility: CLINIC | Age: 44
End: 2024-07-10
Payer: COMMERCIAL

## 2024-07-10 DIAGNOSIS — M54.2 NECK PAIN: Primary | ICD-10-CM

## 2024-07-10 PROCEDURE — 97112 NEUROMUSCULAR REEDUCATION: CPT | Performed by: PHYSICAL THERAPIST

## 2024-07-10 PROCEDURE — 97161 PT EVAL LOW COMPLEX 20 MIN: CPT | Performed by: PHYSICAL THERAPIST

## 2024-07-10 NOTE — PROGRESS NOTES
PT Evaluation     Today's date: 7/10/2024  Patient name: Kendal Garduno  : 1980  MRN: [id number]  Referring provider: Loida Weaver*  Dx:   Encounter Diagnosis     ICD-10-CM    1. Neck pain  M54.2 Ambulatory Referral to Physical Therapy          Start Time: 1215  Stop Time: 1255  Total time in clinic (min): 40 minutes    Assessment  Impairments: abnormal or restricted ROM, activity intolerance, impaired physical strength, lacks appropriate home exercise program and pain with function  Symptom irritability: low    Assessment details: Kendal Garduno is a pleasant 44 y.o. female who presents with acute onset left sided neck pain.  The patient's greatest concerns are the pain she is experiencing, worry over not knowing what's wrong, and fear of not being able to keep active.      No further referral appears necessary at this time based upon examination results.    Primary movement impairment diagnosis of cervical mobility deficits resulting in pathoanatomical symptoms of left sided neck pain and limiting her ability to exercise or recreation, lift, perform household chores, reach overhead, and turn to look over shoulder.    Primary Impairments:  1) Decreased cervicothoracic spine ROM and segmental mobility   2) Impaired postural awareness and control     Etiologic factors include none recalled by the patient.    Understanding of Dx/Px/POC: good     Prognosis: good  Prognosis details: Positive prognostic indicators include positive attitude toward recovery, good understanding of diagnosis and treatment plan options, acuity of symptoms, absence of peripheralization, and absence of observed red flags.  Negative prognostic indicators include none.      Goals  Patient will be independent with home exercise program.   Patient will be able to manage symptoms independently.     Short Term Goals 2-4 wks   1. Pt will be independent with initial HEP   2. Pt will decrease pain in the cervical region to < 2/10  at worst   3. Pt will improve cervical AROM to WNL and pain free     Long Term Goals 6-8 wks  1. Pt will demonstrate improved postural awareness and control   2. Pt will return to all previous activities without reproduction of symptoms   3. Pt will improve FOTO score to greater than expected by d/c      Plan  Patient would benefit from: skilled physical therapy  Planned modality interventions: Modalities PRN.    Planned therapy interventions: activity modification, manual therapy, neuromuscular re-education, patient education, therapeutic activities, therapeutic exercise, graded activity, home exercise program, behavior modification, self care and joint mobilization    Frequency: 1x week  Duration in weeks: 4  Plan of Care expiration date: 8/7/2024  Treatment plan discussed with: patient        Subjective Evaluation    History of Present Illness  Mechanism of injury: Kendal Garduno presents with c/c of acute onset left sided neck pain. No specific TRAM noted. Pt reports symptoms began about 2 wks ago. Notes hx of episodic neck discomfort that typically resolves within a few days with minimal treatment. Reports trying ice, heat soft tissue massage with no change in symptoms. Pt seen by PCP last wk for physical and was referred for OPPT. Denies referred, radicular, CNS, and constitutional symptoms.   Pain location: left cervical.UT region, descriptors: dull, ache, sharp, tight, and tingling  Aggravating factors: left cervical rotation, right lateral flexion, reaching overhead, lifting   Relieving factors: no specific activity   24hr pain pattern: 0/10 (current), 0/10 (best), 6/10 (worst)   Imaging: No recent relevant imaging   Previous treatments: previous physical therapy (several years ago)   Occupation/recreation:  ([employer], currently on summer break)   Sleeping: no disturbed sleep reported   Patient concerns: decreasing pain, improving mobility, and preventing re-occurrence  Special  Questions: Tingling (left 5h digit with sleeping, no change with onset of neck pain)           Objective     Concurrent Complaints  Negative for dizziness, faints, nausea/motion sickness, tinnitus, trouble swallowing, difficulty breathing, shortness of breath and visual change    Postural Observations  Seated posture: poor  Standing posture: fair      Palpation   Left   Tenderness of the upper trapezius.     Tenderness   Cervical Spine   Tenderness in the spinous process and left transverse process.   No tenderness in the right transverse process.     Active Range of Motion   Cervical/Thoracic Spine       Cervical    Flexion: 40 degrees   Extension: 50 degrees      Left lateral flexion: 40 degrees      Right lateral flexion: 20 degrees     with pain  Left rotation: 70 degrees  Right rotation: 60 degrees    with pain    Thoracic    Flexion:  WFL  Extension:  Restriction level: moderate  Left rotation:  Restriction level: minimal  Right rotation:  Restriction level: minimal    Additional Active Range of Motion Details  Pain localized to the left UT region     Joint Play     Hypomobile: C4, C5, C6, C7, T1, T2 and T3     Pain: C5, C6, C7 and T1     Strength/Myotome Testing   Cervical Spine     Left   Normal strength    Right   Normal strength    Additional Strength Details  No pain reported with resisted MMT of the UE's    Tests   Cervical   Negative vertical compression, alar ligament test, Sharp-Carole test and transverse ligament test.     Left   Negative Spurling's Test A.     Right   Negative Spurling's Test A.     Lumbar   Negative vertical compression.     General Comments:      Shoulder Comments   No pain with active elevation overhead              Diagnosis: Cervical Mobility Deficits    Precautions: N/A    POC: 7/10-8/7   Authorization: [employer]    Access Code:  [email or web address]   Visit Count 1 2 3 4 5   Manuals 7/10        C/S & T/S LEANNE Palomino         CTLISSETH HVLA                 Neuro Re-Ed        Cervical Retraction          Scapular Retraction         Prone I/T/Y         TB B/L ER                         There Ex         Active warm up        UT/LS Stretch         Doorway Stretch         S/L Thoracic Rotation                                Ther Act                                                 Modalities                                   Assessment IE        Education S/S, POC, HEP

## 2024-07-11 DIAGNOSIS — R79.89 LOW TSH LEVEL: Primary | ICD-10-CM

## 2024-07-15 ENCOUNTER — OFFICE VISIT (OUTPATIENT)
Dept: PHYSICAL THERAPY | Facility: CLINIC | Age: 44
End: 2024-07-15
Payer: COMMERCIAL

## 2024-07-15 DIAGNOSIS — M54.2 NECK PAIN: Primary | ICD-10-CM

## 2024-07-15 PROCEDURE — 97110 THERAPEUTIC EXERCISES: CPT

## 2024-07-15 PROCEDURE — 97140 MANUAL THERAPY 1/> REGIONS: CPT

## 2024-07-15 PROCEDURE — 97112 NEUROMUSCULAR REEDUCATION: CPT

## 2024-07-15 NOTE — PROGRESS NOTES
"Daily Note     Today's date: 7/15/2024  Patient name: Kendal Garduno  : 1980  MRN: [id number]  Referring provider: Loida Weaver*  Dx:   Encounter Diagnosis     ICD-10-CM    1. Neck pain  M54.2                      Subjective: Pt states compliance w/ current HEP and reports no increase in sx.       Objective: See treatment diary below    Assessment: Tolerated treatment well. Patient would benefit from continued PT.  L levator stretch causes increased discomfort. L UT TTP persists; no TTP BL thoracic paraspinals.       Plan: Progress treatment as tolerated.       Diagnosis: Cervical Mobility Deficits    Precautions: N/A    POC: 7/10-   Authorization: [employer]    Access Code:  [email or web address]   Visit Count 1 2 3 4 5   Manuals 7/10  7/15      C/S & T/S PA Georgette   LM ([facility])      CTJ HVLA                 Neuro Re-Ed        Cervical Retraction   20x3\" seated      Scapular Retraction   20x3\"      Prone I/T/Y         TB B/L ER   YTB 2x10                      There Ex         Active warm up        UT/LS Stretch   5x10\" ea      Doorway Stretch   20\"x3      S/L Thoracic Rotation   5x5\" ea                             Ther Act                                                 Modalities                                   Assessment IE        Education S/S, POC, HEP                "

## 2024-07-22 ENCOUNTER — OFFICE VISIT (OUTPATIENT)
Dept: PHYSICAL THERAPY | Facility: CLINIC | Age: 44
End: 2024-07-22
Payer: COMMERCIAL

## 2024-07-22 DIAGNOSIS — M54.2 NECK PAIN: Primary | ICD-10-CM

## 2024-07-22 PROCEDURE — 97110 THERAPEUTIC EXERCISES: CPT | Performed by: PHYSICAL THERAPIST

## 2024-07-22 PROCEDURE — 97112 NEUROMUSCULAR REEDUCATION: CPT | Performed by: PHYSICAL THERAPIST

## 2024-07-22 PROCEDURE — 97140 MANUAL THERAPY 1/> REGIONS: CPT | Performed by: PHYSICAL THERAPIST

## 2024-07-22 NOTE — PROGRESS NOTES
"Daily Note     Today's date: 2024  Patient name: Kendal Garduno  : 1980  MRN: [id number]  Referring provider: Loida Weaver*  Dx:   Encounter Diagnosis     ICD-10-CM    1. Neck pain  M54.2           Start Time: 0900  Stop Time: 09  Total time in clinic (min): 35 minutes    Subjective: Pt with no new concerns noted at this time.       Objective: See treatment diary below      Assessment: Tolerated treatment well. Progressed POC with good tolerance from the patient. No pain reported with exercises. Occasional cuing required for proper exercise technique. Continue to progress as tolerated. Patient would benefit from continued PT      Plan: Continue per plan of care.      Diagnosis: Cervical Mobility Deficits    Precautions: N/A    POC: 7/10-   Authorization: [employer] PCY    Access Code:  [email or web address]   Visit Count 1 2 3 4 5   Manuals 7/10  7/15 7/22      C/S & T/S PA Mobs   LM ([facility]     CTJ HVLA                 Neuro Re-Ed        Cervical Retraction   20x3\" seated 20x3\" supine      Scapular Retraction   20x3\"      Prone I/T/Y         TB B/L ER   YTB 2x10 20x RTB     TB Row/Ext    20x ea RTB     Cervical Isometrics    10x5\" ea   Lateral flexion              There Ex         Active warm up   UBE 2'/2'      UT/LS Stretch   5x10\" ea UT w/ towel   10x 5\" ea      Doorway Stretch   20\"x3 10x 10\"      S/L Thoracic Rotation   5x5\" ea 10x5\" ea      DB Row    2x10 ea 5#                             Ther Act                                                 Modalities                                   Assessment IE        Education S/S, POC, HEP                  "

## 2024-07-29 ENCOUNTER — APPOINTMENT (OUTPATIENT)
Dept: PHYSICAL THERAPY | Facility: CLINIC | Age: 44
End: 2024-07-29
Payer: COMMERCIAL

## 2024-08-07 LAB — COLOGUARD RESULT REPORTABLE: NORMAL

## 2024-08-13 ENCOUNTER — ANNUAL EXAM (OUTPATIENT)
Dept: GYNECOLOGY | Facility: CLINIC | Age: 44
End: 2024-08-13
Payer: COMMERCIAL

## 2024-08-13 VITALS
SYSTOLIC BLOOD PRESSURE: 140 MMHG | BODY MASS INDEX: 32.95 KG/M2 | HEIGHT: 64 IN | DIASTOLIC BLOOD PRESSURE: 82 MMHG | WEIGHT: 193 LBS

## 2024-08-13 DIAGNOSIS — Z12.39 ENCOUNTER FOR SCREENING BREAST EXAMINATION: ICD-10-CM

## 2024-08-13 DIAGNOSIS — Z12.31 SCREENING MAMMOGRAM FOR BREAST CANCER: ICD-10-CM

## 2024-08-13 DIAGNOSIS — Z90.710 HISTORY OF HYSTERECTOMY: ICD-10-CM

## 2024-08-13 DIAGNOSIS — Z01.419 ENCOUNTER FOR WELL WOMAN EXAM: Primary | ICD-10-CM

## 2024-08-13 PROCEDURE — S0612 ANNUAL GYNECOLOGICAL EXAMINA: HCPCS | Performed by: PHYSICIAN ASSISTANT

## 2024-08-13 NOTE — PROGRESS NOTES
Assessment/Plan:      Diagnoses and all orders for this visit:    Encounter for well woman exam    Screening mammogram for breast cancer  -     Mammo screening bilateral w 3d & cad; Future    Encounter for screening breast examination    History of hysterectomy          Subjective:     Patient ID: Kendal Garduno is a 44 y.o. female.    Pt presents for her annual exam today--  She has no complaints except occ hot flahs  She has no bleeding or pelvic pain--hyster  Bowel and bladder are regular  Colonoscopy--cologuard--inconclusive--needs to repeat  No breast concerns today  Last mammo--23    No pap today.    Rx mammo  Daily mvi        Review of Systems   Constitutional:  Negative for chills, fever and unexpected weight change.   HENT:  Negative for ear pain and sore throat.    Eyes:  Negative for pain and visual disturbance.   Respiratory:  Negative for cough and shortness of breath.    Cardiovascular:  Negative for chest pain and palpitations.   Gastrointestinal:  Negative for abdominal pain, blood in stool, constipation, diarrhea and vomiting.   Genitourinary: Negative.  Negative for dysuria and hematuria.   Musculoskeletal:  Negative for arthralgias and back pain.   Skin:  Negative for color change and rash.   Neurological:  Negative for seizures and syncope.   All other systems reviewed and are negative.        Objective:     Physical Exam  Vitals and nursing note reviewed.   Constitutional:       Appearance: Normal appearance. She is well-developed.   HENT:      Head: Normocephalic and atraumatic.   Chest:   Breasts:     Right: No inverted nipple, mass, nipple discharge or skin change.      Left: No inverted nipple, mass, nipple discharge or skin change.   Abdominal:      Palpations: Abdomen is soft.   Genitourinary:     Exam position: Supine.      Labia:         Right: No rash, tenderness or lesion.         Left: No rash, tenderness or lesion.       Vagina: Normal.      Cervix: No cervical motion tenderness,  discharge or friability.      Uterus: Absent.       Adnexa:         Right: No mass, tenderness or fullness.          Left: No mass, tenderness or fullness.     Musculoskeletal:      Cervical back: Normal range of motion.   Lymphadenopathy:      Lower Body: No right inguinal adenopathy. No left inguinal adenopathy.   Neurological:      Mental Status: She is alert.

## 2024-08-19 ENCOUNTER — HOSPITAL ENCOUNTER (OUTPATIENT)
Dept: MAMMOGRAPHY | Facility: HOSPITAL | Age: 44
Discharge: HOME | End: 2024-08-19
Payer: COMMERCIAL

## 2024-08-19 VITALS — WEIGHT: 192.9 LBS | HEIGHT: 64 IN | BODY MASS INDEX: 32.93 KG/M2

## 2024-08-19 DIAGNOSIS — Z12.31 ENCOUNTER FOR SCREENING MAMMOGRAM FOR MALIGNANT NEOPLASM OF BREAST: ICD-10-CM

## 2024-08-19 PROCEDURE — 77067 SCR MAMMO BI INCL CAD: CPT

## 2024-08-19 PROCEDURE — 77063 BREAST TOMOSYNTHESIS BI: CPT

## 2024-09-07 LAB — COLOGUARD RESULT REPORTABLE: NEGATIVE

## 2024-09-11 ENCOUNTER — VBI (OUTPATIENT)
Dept: ADMINISTRATIVE | Facility: OTHER | Age: 44
End: 2024-09-11

## 2024-09-11 NOTE — TELEPHONE ENCOUNTER
09/11/24 7:43 AM     Chart reviewed for Total Abdominal Hysterectomy was/were submitted to the patient's insurance.     ISHAN SILVA MA   [facility]

## 2024-10-10 NOTE — PATIENT INSTRUCTIONS
Patient can be discharged from our services  She can have blood counts checked once or twice every year or as needed from her primary physician and we can see her as needed for problems related to our speciality    Patient will continue to follow with her primary physician and other consultants
tylenol, oxycodone, motrin/Pre-printed instructions given for other (specify)

## 2024-12-30 DIAGNOSIS — E06.3 HYPOTHYROIDISM DUE TO HASHIMOTO'S THYROIDITIS: ICD-10-CM

## 2024-12-31 RX ORDER — LEVOTHYROXINE SODIUM 88 UG/1
88 TABLET ORAL DAILY
Qty: 90 TABLET | Refills: 1 | Status: SHIPPED | OUTPATIENT
Start: 2024-12-31

## 2025-02-17 ENCOUNTER — RESULTS FOLLOW-UP (OUTPATIENT)
Dept: FAMILY MEDICINE CLINIC | Facility: CLINIC | Age: 45
End: 2025-02-17

## 2025-06-29 DIAGNOSIS — E06.3 HYPOTHYROIDISM DUE TO HASHIMOTO'S THYROIDITIS: ICD-10-CM

## 2025-07-01 RX ORDER — LEVOTHYROXINE SODIUM 88 UG/1
88 TABLET ORAL DAILY
Qty: 30 TABLET | Refills: 0 | Status: SHIPPED | OUTPATIENT
Start: 2025-07-01

## 2025-07-15 ENCOUNTER — TELEPHONE (OUTPATIENT)
Dept: FAMILY MEDICINE CLINIC | Facility: CLINIC | Age: 45
End: 2025-07-15

## 2025-07-15 DIAGNOSIS — Z00.00 ANNUAL PHYSICAL EXAM: ICD-10-CM

## 2025-07-15 DIAGNOSIS — E06.3 HYPOTHYROIDISM DUE TO HASHIMOTO'S THYROIDITIS: Primary | ICD-10-CM

## 2025-07-16 LAB
ALBUMIN SERPL-MCNC: 4.2 G/DL (ref 3.5–5.7)
ALP SERPL-CCNC: 64 U/L (ref 35–120)
ALT SERPL-CCNC: 11 U/L
ANION GAP SERPL CALCULATED.3IONS-SCNC: 10 MMOL/L (ref 3–11)
AST SERPL-CCNC: 13 U/L
BILIRUB SERPL-MCNC: 0.5 MG/DL (ref 0.2–1)
BUN SERPL-MCNC: 9 MG/DL (ref 7–25)
CALCIUM SERPL-MCNC: 8.9 MG/DL (ref 8.5–10.5)
CHLORIDE SERPL-SCNC: 104 MMOL/L (ref 100–109)
CHOLEST SERPL-MCNC: 210 MG/DL
CHOLEST/HDLC SERPL: 3.8 {RATIO}
CO2 SERPL-SCNC: 25 MMOL/L (ref 21–31)
CREAT SERPL-MCNC: 0.73 MG/DL (ref 0.4–1.1)
CYTOLOGY CMNT CVX/VAG CYTO-IMP: NORMAL
GFR/BSA.PRED SERPLBLD CYS-BASED-ARV: 102 ML/MIN/{1.73_M2}
GLUCOSE SERPL-MCNC: 87 MG/DL (ref 65–99)
HDLC SERPL-MCNC: 56 MG/DL (ref 23–92)
LDLC SERPL CALC-MCNC: 135 MG/DL
NONHDLC SERPL-MCNC: 154 MG/DL
POTASSIUM SERPL-SCNC: 4.1 MMOL/L (ref 3.5–5.2)
PROT SERPL-MCNC: 6.3 G/DL (ref 6.3–8.3)
SODIUM SERPL-SCNC: 139 MMOL/L (ref 135–145)
TRIGL SERPL-MCNC: 94 MG/DL
TSH SERPL-ACNC: 0.93 UIU/ML (ref 0.45–5.33)

## 2025-07-21 ENCOUNTER — OFFICE VISIT (OUTPATIENT)
Dept: FAMILY MEDICINE CLINIC | Facility: CLINIC | Age: 45
End: 2025-07-21
Payer: COMMERCIAL

## 2025-07-21 VITALS
SYSTOLIC BLOOD PRESSURE: 112 MMHG | HEART RATE: 92 BPM | WEIGHT: 197.5 LBS | TEMPERATURE: 98.4 F | RESPIRATION RATE: 16 BRPM | DIASTOLIC BLOOD PRESSURE: 78 MMHG | OXYGEN SATURATION: 99 % | HEIGHT: 64 IN | BODY MASS INDEX: 33.72 KG/M2

## 2025-07-21 DIAGNOSIS — M25.551 RIGHT HIP PAIN: ICD-10-CM

## 2025-07-21 DIAGNOSIS — E66.9 OBESITY (BMI 30-39.9): ICD-10-CM

## 2025-07-21 DIAGNOSIS — Z00.00 ANNUAL PHYSICAL EXAM: Primary | ICD-10-CM

## 2025-07-21 PROCEDURE — 99213 OFFICE O/P EST LOW 20 MIN: CPT

## 2025-07-21 PROCEDURE — 99396 PREV VISIT EST AGE 40-64: CPT

## 2025-07-21 NOTE — PROGRESS NOTES
Adult Annual Physical  Name: Kendal Garduno      : 1980      MRN: [id number]  Encounter Provider: Km Salgado DO  Encounter Date: 2025   Encounter department: [facility]    :  Assessment & Plan  Annual physical exam  - screening for cardiovascular disease:  - screening for breast cancer: mammogram scheduled  - screening for cervical cancer: follows with gyn   - screening for colon cancer: cologuard 2024, next   - immunizations: UTD  - counseled pt on lifestyle modifications such as diet changes and 150 mins/weekly of moderate intensity exercise          Right hip pain  Ongoing since April, point tenderness over the anterior hip joint.  Negative FADIR and ROBERT.  Will get x-ray due to duration of symptoms.  Likely will need some physical therapy.  Continue supportive care at home exercises  Orders:    XR hip/pelvis 4+ vw right if performed; Future    Obesity (BMI 30-39.9)  Notes increased trouble with weight gain over the last several months.  Since January has been more proactive with exercising routinely and has been working on diet modification.  Cut back on junk food starting in .  Discussed option for referral to weight management patient is interested, patient would like to work on lifestyle modifications little bit more and will let us know             Preventive Screenings:  - Diabetes Screening: screening up-to-date  - Cholesterol Screening: screening up-to-date   - Hepatitis C screening: screening up-to-date   - Breast cancer screening: screening up-to-date   - Colon cancer screening: screening up-to-date   - Lung cancer screening: screening not indicated     Counseling/Anticipatory Guidance:    - Tobacco use: discussed harms of tobacco use and management options for quitting.   - Dental health: discussed importance of regular tooth brushing, flossing, and dental visits.   - Diet: discussed recommendations for a healthy/well-balanced diet.   - Exercise: the  importance of regular exercise/physical activity was discussed. Recommend exercise 3-5 times per week for at least 30 minutes.   - Injury prevention: discussed safety/seat belts, safety helmets, smoke detectors, carbon monoxide detectors, and smoking near bedding or upholstery.       Depression Screening and Follow-up Plan: Patient was screened for depression during today's encounter. They screened negative with a PHQ-2 score of 0.          History of Present Illness     Adult Annual Physical:  Patient presents for annual physical. Works as a .     Having hip pain since April - stabbing pain. Hurts worse when lying on it bryce at night. Rest and ice as much as possible. Tylenol/motrin with minimal improvement. Was doing leg exercises that she was   Hx of bursitis in that hip. Started exercising in January 1st. .     Diet and Physical Activity:  - Diet/Nutrition: limited junk food, no special diet, consuming 3-5 servings of fruits/vegetables daily, adequate whole grain intake and adequate fiber intake.  - Exercise: walking and moderate cardiovascular exercise. 10k steps in daily    Depression Screening:  - PHQ-2 Score: 0    General Health:  - Sleep: sleeps well, sleeps poorly and 7-8 hours of sleep on average.  - Hearing: decreased hearing bilateral ears and requires use of hearing aids.  - Vision: most recent eye exam < 1 year ago and wears glasses.  - Dental: regular dental visits, brushes teeth twice daily and floss regularly.    /GYN Health:  - Follows with GYN: yes.   - Menopause: premenopausal.   - History of STDs: no  - Contraception: hysterectomy.      Advanced Care Planning:  - Has an advanced directive?: no    - Has a durable medical POA?: no      Review of Systems   Constitutional:  Negative for chills and fever.   HENT:  Negative for congestion and rhinorrhea.    Eyes:  Negative for visual disturbance.   Respiratory:  Negative for cough and shortness of breath.    Cardiovascular:   "Negative for chest pain and palpitations.   Gastrointestinal:  Negative for abdominal pain, constipation, diarrhea, nausea and vomiting.   Genitourinary:  Negative for dysuria.   Musculoskeletal:  Positive for arthralgias (R hip).   Skin:  Negative for rash.   Neurological:  Negative for dizziness, light-headedness and headaches.     Medications Ordered Prior to Encounter[1]     Objective   /78 (BP Location: Left arm, Patient Position: Sitting, Cuff Size: Large)   Pulse 92   Temp 98.4 °F (36.9 °C) (Temporal)   Resp 16   Ht 5' 4\" (1.626 m)   Wt 89.6 kg (197 lb 8 oz)   LMP 01/18/2014 (Exact Date)   SpO2 99%   BMI 33.90 kg/m²     Physical Exam  Vitals reviewed.   Constitutional:       Appearance: Normal appearance.   HENT:      Head: Normocephalic and atraumatic.      Right Ear: External ear normal.      Left Ear: External ear normal.      Nose: Nose normal.      Mouth/Throat:      Pharynx: Oropharynx is clear.     Eyes:      Extraocular Movements: Extraocular movements intact.      Conjunctiva/sclera: Conjunctivae normal.       Cardiovascular:      Rate and Rhythm: Normal rate and regular rhythm.      Pulses: Normal pulses.      Heart sounds: Normal heart sounds.   Pulmonary:      Effort: Pulmonary effort is normal.      Breath sounds: Normal breath sounds.   Abdominal:      Palpations: Abdomen is soft.     Musculoskeletal:      Cervical back: Neck supple.      Right lower leg: No edema.      Left lower leg: No edema.      Comments: Right hip: Negative ROBERT and FADIR, some point tenderness over the anterior hip joint     Skin:     General: Skin is warm.     Neurological:      Mental Status: She is alert and oriented to person, place, and time.     Psychiatric:         Mood and Affect: Mood normal.         Behavior: Behavior normal.         Thought Content: Thought content normal.         Judgment: Judgment normal.                [1]   Current Outpatient Medications on File Prior to Visit   Medication " Sig Dispense Refill    Ascorbic Acid (YASMINE-C PO) Take by mouth Gummy      Bioflavonoid Products (IDANIA C PO) Take 1 tablet by mouth daily      cetirizine (ZyrTEC) 10 mg tablet Take 10 mg by mouth daily      Cholecalciferol (VITAMIN D) 2000 units tablet Take by mouth      levothyroxine 88 mcg tablet TAKE ONE TABLET BY MOUTH EVERY DAY 30 tablet 0    Methylcobalamin (B12-ACTIVE PO)       Multiple Vitamin (MULTIVITAMIN) tablet Take 1 tablet by mouth in the morning.       No current facility-administered medications on file prior to visit.

## 2025-07-21 NOTE — PATIENT INSTRUCTIONS
"Patient Education     Routine physical for adults   The Basics   Written by the doctors and editors at [facility]   What is a physical? -- A physical is a routine visit, or \"check-up,\" with your doctor. You might also hear it called a \"wellness visit\" or \"preventive visit.\"  During each visit, the doctor will:   Ask about your physical and mental health   Ask about your habits, behaviors, and lifestyle   Do an exam   Give you vaccines if needed   Talk to you about any medicines you take   Give advice about your health   Answer your questions  Getting regular check-ups is an important part of taking care of your health. It can help your doctor find and treat any problems you have. But it's also important for preventing health problems.  A routine physical is different from a \"sick visit.\" A sick visit is when you see a doctor because of a health concern or problem. Since physicals are scheduled ahead of time, you can think about what you want to ask the doctor.  How often should I get a physical? -- It depends on your age and health. In general, for people age 21 years and older:   If you are younger than 50 years, you might be able to get a physical every 3 years.   If you are 50 years or older, your doctor might recommend a physical every year.  If you have an ongoing health condition, like diabetes or high blood pressure, your doctor will probably want to see you more often.  What happens during a physical? -- In general, each visit will include:   Physical exam - The doctor or nurse will check your height, weight, heart rate, and blood pressure. They will also look at your eyes and ears. They will ask about how you are feeling and whether you have any symptoms that bother you.   Medicines - It's a good idea to bring a list of all the medicines you take to each doctor visit. Your doctor will talk to you about your medicines and answer any questions. Tell them if you are having any side effects that bother you. You " "should also tell them if you are having trouble paying for any of your medicines.   Habits and behaviors - This includes:   Your diet   Your exercise habits   Whether you smoke, drink alcohol, or use drugs   Whether you are sexually active   Whether you feel safe at home  Your doctor will talk to you about things you can do to improve your health and lower your risk of health problems. They will also offer help and support. For example, if you want to quit smoking, they can give you advice and might prescribe medicines. If you want to improve your diet or get more physical activity, they can help you with this, too.   Lab tests, if needed - The tests you get will depend on your age and situation. For example, your doctor might want to check your:   Cholesterol   Blood sugar   Iron level   Vaccines - The recommended vaccines will depend on your age, health, and what vaccines you already had. Vaccines are very important because they can prevent certain serious or deadly infections.   Discussion of screening - \"Screening\" means checking for diseases or other health problems before they cause symptoms. Your doctor can recommend screening based on your age, risk, and preferences. This might include tests to check for:   Cancer, such as breast, prostate, cervical, ovarian, colorectal, prostate, lung, or skin cancer   Sexually transmitted infections, such as chlamydia and gonorrhea   Mental health conditions like depression and anxiety  Your doctor will talk to you about the different types of screening tests. They can help you decide which screenings to have. They can also explain what the results might mean.   Answering questions - The physical is a good time to ask the doctor or nurse questions about your health. If needed, they can refer you to other doctors or specialists, too.  Adults older than 65 years often need other care, too. As you get older, your doctor will talk to you about:   How to prevent falling at " home   Hearing or vision tests   Memory testing   How to take your medicines safely   Making sure that you have the help and support you need at home  All topics are updated as new evidence becomes available and our peer review process is complete.  This topic retrieved from [employer] on: May 02, 2024.  Topic 016549 Version 1.0  Release: 32.4.3 - C32.122  © 2024 Up[employer]Date, Inc. and/or its affiliates. All rights reserved.  Consumer Information Use and Disclaimer   Disclaimer: This generalized information is a limited summary of diagnosis, treatment, and/or medication information. It is not meant to be comprehensive and should be used as a tool to help the user understand and/or assess potential diagnostic and treatment options. It does NOT include all information about conditions, treatments, medications, side effects, or risks that may apply to a specific patient. It is not intended to be medical advice or a substitute for the medical advice, diagnosis, or treatment of a health care provider based on the health care provider's examination and assessment of a patient's specific and unique circumstances. Patients must speak with a health care provider for complete information about their health, medical questions, and treatment options, including any risks or benefits regarding use of medications. This information does not endorse any treatments or medications as safe, effective, or approved for treating a specific patient. Up[employer], Inc[employer] and its affiliates disclaim any warranty or liability relating to this information or the use thereof.The use of this information is governed by the Terms of Use, available at https://www.woltersEyeonplayuwer.com/en/know/clinical-effectiveness-terms. 2024© UpToDate, Inc. and its affiliates and/or licensors. All rights reserved.  Copyright   © 2024 [employer], Inc. and/or its affiliates. All rights reserved.

## 2025-07-22 ENCOUNTER — HOSPITAL ENCOUNTER (OUTPATIENT)
Dept: RADIOLOGY | Facility: HOSPITAL | Age: 45
Discharge: HOME | End: 2025-07-22
Payer: COMMERCIAL

## 2025-07-22 DIAGNOSIS — M25.551 RIGHT HIP PAIN: ICD-10-CM

## 2025-07-22 PROCEDURE — 73502 X-RAY EXAM HIP UNI 2-3 VIEWS: CPT

## 2025-07-24 ENCOUNTER — RESULTS FOLLOW-UP (OUTPATIENT)
Dept: FAMILY MEDICINE CLINIC | Facility: CLINIC | Age: 45
End: 2025-07-24

## 2025-07-24 DIAGNOSIS — M25.551 RIGHT HIP PAIN: Primary | ICD-10-CM

## 2025-07-24 NOTE — TELEPHONE ENCOUNTER
Patient called back.  Message and recommendations given.  Verbalized understanding.    No further action.

## 2025-07-28 DIAGNOSIS — E06.3 HYPOTHYROIDISM DUE TO HASHIMOTO'S THYROIDITIS: ICD-10-CM

## 2025-07-29 RX ORDER — LEVOTHYROXINE SODIUM 88 UG/1
88 TABLET ORAL DAILY
Qty: 90 TABLET | Refills: 0 | Status: SHIPPED | OUTPATIENT
Start: 2025-07-29

## 2025-08-04 ENCOUNTER — EVALUATION (OUTPATIENT)
Dept: PHYSICAL THERAPY | Facility: CLINIC | Age: 45
End: 2025-08-04
Payer: COMMERCIAL

## 2025-08-04 DIAGNOSIS — M25.551 RIGHT HIP PAIN: ICD-10-CM

## 2025-08-04 PROCEDURE — 97161 PT EVAL LOW COMPLEX 20 MIN: CPT

## 2025-08-04 PROCEDURE — 97110 THERAPEUTIC EXERCISES: CPT

## 2025-08-04 PROCEDURE — 97535 SELF CARE MNGMENT TRAINING: CPT

## 2025-08-13 ENCOUNTER — OFFICE VISIT (OUTPATIENT)
Dept: PHYSICAL THERAPY | Facility: CLINIC | Age: 45
End: 2025-08-13
Payer: COMMERCIAL

## 2025-08-14 ENCOUNTER — ANNUAL EXAM (OUTPATIENT)
Dept: OBGYN CLINIC | Facility: CLINIC | Age: 45
End: 2025-08-14
Payer: COMMERCIAL